# Patient Record
Sex: MALE | Race: WHITE | NOT HISPANIC OR LATINO | Employment: OTHER | URBAN - NONMETROPOLITAN AREA
[De-identification: names, ages, dates, MRNs, and addresses within clinical notes are randomized per-mention and may not be internally consistent; named-entity substitution may affect disease eponyms.]

---

## 2017-07-26 ENCOUNTER — OFFICE VISIT - GICH (OUTPATIENT)
Dept: PEDIATRICS | Facility: OTHER | Age: 72
End: 2017-07-26

## 2017-07-26 ENCOUNTER — HISTORY (OUTPATIENT)
Dept: PEDIATRICS | Facility: OTHER | Age: 72
End: 2017-07-26

## 2017-07-26 DIAGNOSIS — E78.5 HYPERLIPIDEMIA: ICD-10-CM

## 2017-07-26 DIAGNOSIS — Z95.9 PRESENCE OF CARDIAC AND VASCULAR IMPLANT AND GRAFT: ICD-10-CM

## 2017-07-26 DIAGNOSIS — I25.10 ATHEROSCLEROTIC HEART DISEASE OF NATIVE CORONARY ARTERY WITHOUT ANGINA PECTORIS: ICD-10-CM

## 2017-07-26 DIAGNOSIS — I10 ESSENTIAL (PRIMARY) HYPERTENSION: ICD-10-CM

## 2017-07-26 DIAGNOSIS — R09.89 OTHER SPECIFIED SYMPTOMS AND SIGNS INVOLVING THE CIRCULATORY AND RESPIRATORY SYSTEMS: ICD-10-CM

## 2017-07-26 DIAGNOSIS — J30.9 ALLERGIC RHINITIS: ICD-10-CM

## 2017-07-26 DIAGNOSIS — R31.1 BENIGN ESSENTIAL MICROSCOPIC HEMATURIA: ICD-10-CM

## 2017-07-26 DIAGNOSIS — E03.9 HYPOTHYROIDISM: ICD-10-CM

## 2017-07-26 DIAGNOSIS — Z11.59 ENCOUNTER FOR SCREENING FOR OTHER VIRAL DISEASES (CODE): ICD-10-CM

## 2017-07-26 LAB
ANION GAP - HISTORICAL: 12 (ref 5–18)
BUN SERPL-MCNC: 20 MG/DL (ref 7–25)
BUN/CREAT RATIO - HISTORICAL: 15
CALCIUM SERPL-MCNC: 10 MG/DL (ref 8.6–10.3)
CHLORIDE SERPLBLD-SCNC: 104 MMOL/L (ref 98–107)
CHOL/HDL RATIO - HISTORICAL: 2.79
CHOLESTEROL TOTAL: 145 MG/DL
CO2 SERPL-SCNC: 26 MMOL/L (ref 21–31)
CREAT SERPL-MCNC: 1.31 MG/DL (ref 0.7–1.3)
GFR IF NOT AFRICAN AMERICAN - HISTORICAL: 54 ML/MIN/1.73M2
GLUCOSE SERPL-MCNC: 104 MG/DL (ref 70–105)
HDLC SERPL-MCNC: 52 MG/DL (ref 23–92)
HEPATITIS C ANTIBODY CATEGORY - HISTORICAL: NORMAL
LDLC SERPL CALC-MCNC: 67 MG/DL
NON-HDL CHOLESTEROL - HISTORICAL: 93 MG/DL
PATIENT STATUS - HISTORICAL: NORMAL
POTASSIUM SERPL-SCNC: 4.3 MMOL/L (ref 3.5–5.1)
SODIUM SERPL-SCNC: 142 MMOL/L (ref 133–143)
TRIGL SERPL-MCNC: 132 MG/DL
TSH - HISTORICAL: 0.33 UIU/ML (ref 0.34–5.6)

## 2017-08-01 ENCOUNTER — HOSPITAL ENCOUNTER (OUTPATIENT)
Dept: RESPIRATORY THERAPY | Facility: OTHER | Age: 72
End: 2017-08-01
Attending: INTERNAL MEDICINE

## 2017-08-01 DIAGNOSIS — I25.10 ATHEROSCLEROTIC HEART DISEASE OF NATIVE CORONARY ARTERY WITHOUT ANGINA PECTORIS: ICD-10-CM

## 2017-08-01 DIAGNOSIS — R09.89 OTHER SPECIFIED SYMPTOMS AND SIGNS INVOLVING THE CIRCULATORY AND RESPIRATORY SYSTEMS: ICD-10-CM

## 2017-08-01 DIAGNOSIS — Z95.9 PRESENCE OF CARDIAC AND VASCULAR IMPLANT AND GRAFT: ICD-10-CM

## 2017-10-03 ENCOUNTER — AMBULATORY - GICH (OUTPATIENT)
Dept: FAMILY MEDICINE | Facility: OTHER | Age: 72
End: 2017-10-03

## 2017-10-03 DIAGNOSIS — Z23 ENCOUNTER FOR IMMUNIZATION: ICD-10-CM

## 2017-12-27 NOTE — PROGRESS NOTES
Patient Information     Patient Name MRN Sex Sandro Howe [id number] Male 1945      Progress Notes by Guero Turner MD at 2017  8:30 AM     Author:  Guero Turner MD Service:  (none) Author Type:  Physician     Filed:  2017 11:15 AM Encounter Date:  2017 Status:  Signed     :  Guero Turner MD (Physician)            Subjective  Sandro Yoon is a 72 y.o. male who presents for med education management, multiple concerns. His home blood pressure cuff has been detecting an irregular pulse. He feels a slight weakness during these episodes. No feeling of palpitations. On one occasion his meter detected a pulse of 41. No known history of atrial fibrillation. He does have a history of atherosclerotic cardiovascular disease status post angioplasty with stent and continues on aspirin and Lipitor. He's wondering if he could possibly stop his Lipitor because his lipid panels have been so low. He has a history of hypothyroidism which has been stable many years on levothyroxine 100  g daily. History of hypertension which has been stable with metoprolol and ramipril. History of erectile dysfunction which improves with rare and intermittent use of sildenafil. No chest pain or dyspnea on exertion. No orthopnea. No lower extremity edema. No rash. No diarrhea or constipation. No change in urinary habits.    Problem List/PMH: reviewed in EMR, and made relevant updates today.  Medications: reviewed in EMR, and made relevant updates today.  Allergies: reviewed in EMR, and made relevant updates today.    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      Never smoked. No coffee.  One soda daily.  Rare alcohol.      Likes to cross  "country ski and bike. .      I reviewed social history and made relevant updates today.    Family Hx:   Family History       Problem   Relation Age of Onset     Heart Disease  Mother      CAD       Heart Disease  Father      CAD       Cancer-prostate  Father 80     Heart Disease  Brother      3 of 4 have CAD       Cancer-prostate  Brother 40     Other  Brother      [facility] [place]       Cancer-prostate  Brother 65     Other  Brother      no heart disease       Other  Brother      [facility] [place] age 40s       Cancer-colon  No Family History      Anesthesia Problem  No Family History      Blood Disease  No Family History      no bleeding or clotting         Objective  Vitals: reviewed in EMR.  /82  Pulse 76  Ht 1.715 m (5' 7.5\")  Wt 72.1 kg (159 lb)  BMI 24.54 kg/m2    Gen: Pleasant male, NAD.  HEENT: MMM, no OP erythema.   Neck: Supple, no JVD, no bruits. No thyromegaly  CV: RRR no m/r/g.   Pulm: CTAB no w/r/r  GI: Soft, NT, ND. No HSM. No masses. Bowel sounds present.  Neuro: Grossly intact  Msk: No lower extremity edema.  Skin: No concerning lesions.  Psychiatric: Normal affect and insight. Does not appear anxious or depressed.    Diabetes Labs  Lab Results      Component  Value Date/Time    CHOL 145 07/26/2017 09:37 AM    HDL 52 07/26/2017 09:37 AM    LDLCHOL 67 07/26/2017 09:37 AM    TRIGLYCERIDE 132 07/26/2017 09:37 AM    CREATININE 1.31 (H) 07/26/2017 09:37 AM     PSA TOTAL (SCREEN) (ng/mL)    Date Value   10/18/2016 1.585         Assessment    ICD-10-CM    1. Pulse irregularity R09.89 ZIO PATCH-GICH      EKG 12 LEAD UNIT PERFORMED      NH ELECTROCARDIOGRAM TRACING   2. Hyperlipidemia, unspecified hyperlipidemia type E78.5 atorvastatin (LIPITOR) 10 mg tablet      LIPID PANEL      LIPID PANEL   3. Hypothyroidism, unspecified type E03.9 levothyroxine (SYNTHROID) 100 mcg tablet      TSH      TSH   4. Hypertension I10 metoprolol succinate (TOPROL XL) 50 mg sustained-release tablet      ramipril " (ALTACE) 2.5 mg capsule      BASIC METABOLIC PANEL      BASIC METABOLIC PANEL   5. S/P angioplasty with stent Z95.9 ZIO PATCH-GICH   6. ASCVD (arteriosclerotic cardiovascular disease) I25.10 ZIO PATCH-GICH   7. Benign microscopic hematuria R31.1     s/p workup with Mcginnis, negative.     8. Need for hepatitis C screening test Z11.59 ANTI HCV      ANTI HCV   9. Allergic rhinitis, unspecified allergic rhinitis trigger, unspecified rhinitis seasonality J30.9 fluticasone (50 mcg per actuation) nasal solution (FLONASE)       Initially when I listen to his pulse was irregular sounding like a pattern of bigeminy however by the time EKG was obtained it was sinus rhythm and subsequent auscultation revealed a regular rhythm. We discussed options and decided to obtain zio patch to rule out paroxysmal SVT, paroxysmal atrial fibrillation or other radio tachyarrhythmias. Fortunately he doesn't really feel ill during these episodes so it's unlikely to be ventricular tachycardia. No symptoms consistent with active angina.    Plan   -- Expected clinical course discussed   -- Medications and their side effects discussed   -- labs today   -- immunizations are up to date   -- medications reviewed and refilled today   -- zio patch   -- follow-up based on results   -- else follow-up for med management every 6-12 months    Signed, Guero Turner MD  Internal Medicine & Pediatrics

## 2017-12-30 NOTE — NURSING NOTE
Patient Information     Patient Name MRN Sandro Cardenas [id number] Male 1945      Nursing Note by Leesa Squires at 2017  8:30 AM     Author:  Leesa Squires Service:  (none) Author Type:  (none)     Filed:  2017  8:46 AM Encounter Date:  2017 Status:  Signed     :  Leesa Squires            Patient presents to clinic for medication management today.  Leesa Squires LPN ....................  2017   8:29 AM

## 2018-01-18 ENCOUNTER — OFFICE VISIT - GICH (OUTPATIENT)
Dept: PEDIATRICS | Facility: OTHER | Age: 73
End: 2018-01-18

## 2018-01-18 ENCOUNTER — HISTORY (OUTPATIENT)
Dept: PEDIATRICS | Facility: OTHER | Age: 73
End: 2018-01-18

## 2018-01-18 DIAGNOSIS — K40.90 UNILATERAL INGUINAL HERNIA WITHOUT OBSTRUCTION OR GANGRENE: ICD-10-CM

## 2018-01-25 VITALS
DIASTOLIC BLOOD PRESSURE: 82 MMHG | SYSTOLIC BLOOD PRESSURE: 120 MMHG | WEIGHT: 159 LBS | HEART RATE: 76 BPM | HEIGHT: 68 IN | BODY MASS INDEX: 24.1 KG/M2

## 2018-01-29 ENCOUNTER — DOCUMENTATION ONLY (OUTPATIENT)
Dept: FAMILY MEDICINE | Facility: OTHER | Age: 73
End: 2018-01-29

## 2018-01-29 PROBLEM — J30.9 ALLERGIC RHINITIS: Status: ACTIVE | Noted: 2017-07-26

## 2018-01-29 PROBLEM — K57.30 DIVERTICULAR DISEASE OF COLON: Status: ACTIVE | Noted: 2018-01-29

## 2018-01-29 PROBLEM — I25.10 CORONARY ATHEROSCLEROSIS: Status: ACTIVE | Noted: 2018-01-29

## 2018-01-29 PROBLEM — E03.9 HYPOTHYROIDISM: Status: ACTIVE | Noted: 2018-01-29

## 2018-01-29 PROBLEM — R31.1 BENIGN MICROSCOPIC HEMATURIA: Status: ACTIVE | Noted: 2017-07-26

## 2018-01-29 PROBLEM — I25.10 ASCVD (ARTERIOSCLEROTIC CARDIOVASCULAR DISEASE): Status: ACTIVE | Noted: 2017-07-26

## 2018-01-29 PROBLEM — E78.5 HYPERLIPIDEMIA: Status: ACTIVE | Noted: 2018-01-29

## 2018-01-29 RX ORDER — FLUTICASONE PROPIONATE 50 MCG
2 SPRAY, SUSPENSION (ML) NASAL DAILY
COMMUNITY
Start: 2017-07-26 | End: 2019-05-07

## 2018-01-29 RX ORDER — TRIAMCINOLONE ACETONIDE 1 MG/G
1 OINTMENT TOPICAL 3 TIMES DAILY
COMMUNITY
Start: 2012-08-23 | End: 2019-09-10

## 2018-01-29 RX ORDER — ASPIRIN 81 MG/1
81 TABLET, CHEWABLE ORAL
COMMUNITY
Start: 2015-11-12 | End: 2019-09-10

## 2018-01-29 RX ORDER — METOPROLOL SUCCINATE 50 MG/1
50 TABLET, EXTENDED RELEASE ORAL DAILY
COMMUNITY
Start: 2017-07-26 | End: 2018-08-23

## 2018-01-29 RX ORDER — SILDENAFIL CITRATE 20 MG/1
20-40 TABLET ORAL PRN
COMMUNITY
Start: 2016-10-11 | End: 2018-08-21

## 2018-01-29 RX ORDER — NAPROXEN SODIUM 220 MG
220 TABLET ORAL PRN
COMMUNITY
End: 2018-08-21

## 2018-01-29 RX ORDER — LEVOTHYROXINE SODIUM 100 UG/1
100 TABLET ORAL
COMMUNITY
Start: 2017-07-26 | End: 2018-08-23

## 2018-01-29 RX ORDER — ACETAMINOPHEN 500 MG
500 TABLET ORAL EVERY 6 HOURS PRN
COMMUNITY
Start: 2014-12-10 | End: 2019-05-30

## 2018-01-29 RX ORDER — ATORVASTATIN CALCIUM 10 MG/1
5 TABLET, FILM COATED ORAL AT BEDTIME
COMMUNITY
Start: 2017-07-26 | End: 2018-08-21

## 2018-01-29 RX ORDER — RAMIPRIL 2.5 MG/1
2.5 CAPSULE ORAL DAILY
COMMUNITY
Start: 2017-07-26 | End: 2018-09-17

## 2018-01-29 RX ORDER — FLUOROURACIL 50 MG/G
1 CREAM TOPICAL AT BEDTIME
COMMUNITY
Start: 2016-07-07 | End: 2019-09-10

## 2018-01-30 ENCOUNTER — HISTORY (OUTPATIENT)
Dept: SURGERY | Facility: OTHER | Age: 73
End: 2018-01-30

## 2018-01-30 ENCOUNTER — OFFICE VISIT - GICH (OUTPATIENT)
Dept: SURGERY | Facility: OTHER | Age: 73
End: 2018-01-30

## 2018-01-30 DIAGNOSIS — K40.90 UNILATERAL INGUINAL HERNIA WITHOUT OBSTRUCTION OR GANGRENE: ICD-10-CM

## 2018-02-09 VITALS
WEIGHT: 159.5 LBS | HEART RATE: 72 BPM | SYSTOLIC BLOOD PRESSURE: 120 MMHG | BODY MASS INDEX: 24.61 KG/M2 | DIASTOLIC BLOOD PRESSURE: 70 MMHG

## 2018-02-09 VITALS
HEIGHT: 68 IN | SYSTOLIC BLOOD PRESSURE: 118 MMHG | HEART RATE: 72 BPM | BODY MASS INDEX: 24.02 KG/M2 | WEIGHT: 158.5 LBS | DIASTOLIC BLOOD PRESSURE: 68 MMHG

## 2018-02-13 ENCOUNTER — OFFICE VISIT (OUTPATIENT)
Dept: PEDIATRICS | Facility: OTHER | Age: 73
End: 2018-02-13
Attending: INTERNAL MEDICINE
Payer: COMMERCIAL

## 2018-02-13 VITALS
SYSTOLIC BLOOD PRESSURE: 116 MMHG | HEART RATE: 76 BPM | HEIGHT: 68 IN | TEMPERATURE: 96.9 F | WEIGHT: 159 LBS | DIASTOLIC BLOOD PRESSURE: 72 MMHG | BODY MASS INDEX: 24.1 KG/M2

## 2018-02-13 DIAGNOSIS — L30.9 DERMATITIS: Primary | ICD-10-CM

## 2018-02-13 PROCEDURE — G0463 HOSPITAL OUTPT CLINIC VISIT: HCPCS

## 2018-02-13 PROCEDURE — 99213 OFFICE O/P EST LOW 20 MIN: CPT | Performed by: INTERNAL MEDICINE

## 2018-02-13 RX ORDER — PREDNISONE 20 MG/1
20 TABLET ORAL DAILY
Qty: 3 TABLET | Refills: 0 | Status: SHIPPED | OUTPATIENT
Start: 2018-02-13 | End: 2018-02-16

## 2018-02-13 ASSESSMENT — PAIN SCALES - GENERAL: PAINLEVEL: NO PAIN (0)

## 2018-02-13 NOTE — MR AVS SNAPSHOT
"              After Visit Summary   2/13/2018    Sandro Yoon    MRN: [id number]           Patient Information     Date Of Birth          1945        Visit Information        Provider Department      2/13/2018 10:00 AM Guero Turner MD [facility]        Today's Diagnoses     Dermatitis    -  1      Care Instructions     -- Prednisone x 3 days   -- Topical steroid for particularly itchy spot   -- Liberally use a fragrance free moisturizer all over   -- Avoid the pool briefly      Contact Dermatitis  Contact dermatitis is a skin rash caused by something that touches the skin and makes it irritated and inflamed. Your skin may be red, swollen, dry, and may be cracked. Blisters may form and ooze. The rash will itch.  Contact dermatitis can form on the face and neck, backs of hands, forearms, genitals, and lower legs.  People can get contact dermatitis from lots of sources. These include:    Plants such as poison ivy, oak, or sumac    Chemicals in hair dyes and rinses, soaps, solvents, waxes, fingernail polish, and deodorants     Jewelry or watchbands made of nickel  Contact dermatitis is not passed from person to person.  Talk with your healthcare provider about what may have caused the rash. A type of allergy testing called \"patch testing\" may be used to discover what you are allergic to. You will need to avoid the source of your rash in the future to prevent it from coming back.  Treatment is done to relieve itching and prevent the rash from coming back. The rash should go away in a few days to a few weeks.  Home care  Your healthcare provider may prescribe medicine to relieve swelling and itching. Follow all instructions when using these medicines.  General care:    Avoid anything that heats up your skin, such as hot showers or baths, or direct sunlight. This can make itching worse.    Apply cold compresses to soothe your sores to help relieve your symptoms. Do this for 30 " minutes 3 to 4 times a day. You can make a cold compress by soaking a cloth in cold water. Squeeze out excess water. You can add colloidal oatmeal to the water to help reduce itching. For severe itching in a small area, apply an ice pack wrapped in a thin towel. Do this for 20 minutes 3 to 4 times a day.    You can also try wet dressings. One way to do this is to wear a wet piece of clothing under a dry one. Wear a damp shirt under a dry shirt if your upper body is affected. This can relieve itching and prevent you from scratching the affected area.    You can also help relieve large areas of itching by taking a lukewarm bath with colloidal oatmeal added to the water.    Use hydrocortisone cream for redness and irritation, unless another medicine was prescribed. You can also use benzocaine anesthetic cream or spray. Calamine lotion can also relieve mild symptoms.    Use oral diphenhydramine to help reduce itching. You can buy this antihistamine at drug and grocery stores. It can make you sleepy, so use lower doses during the daytime. Or you can use loratadine. This is an antihistamine that will not make you sleepy. Do not use diphenhydramine if you have glaucoma or have trouble urinating due to an enlarged prostate.    If a plant causes your rash, make sure to wash your skin and the clothes you were wearing when you came into contact with the plant. This is to wash away the plant oils that gave you the rash and prevent more or worse symptoms.    Stay away from the substance or object that causes your symptoms. If you can t avoid it, wear gloves or some other type of protection.  Follow-up care  Follow up with your healthcare provider, or as advised.  When to seek medical advice  Call your healthcare provider right away if any of these occur:    Spreading of the rash to other parts of your body    Severe swelling of your face, eyelids, mouth, throat or tongue    Trouble urinating due to swelling in the genital  "area    Fever of 100.4 F (38 C) or higher    Redness or swelling that gets worse    Pain that gets worse    Foul-smelling fluid leaking from the skin    Yellow-brown crusts on the open blisters  Date Last Reviewed: 2016-2017 [employer]. [address] [place] [address]. All rights reserved. This information is not intended as a substitute for professional medical care. Always follow your healthcare professional's instructions.                Follow-ups after your visit        Who to contact     If you have questions or need follow up information about today's clinic visit or your schedule please contact [facility] directly at [phone].  Normal or non-critical lab and imaging results will be communicated to you by Preedohart, letter or phone within 4 business days after the clinic has received the results. If you do not hear from us within 7 days, please contact the clinic through Preedohart or phone. If you have a critical or abnormal lab result, we will notify you by phone as soon as possible.  Submit refill requests through Nveloped or call your pharmacy and they will forward the refill request to us. Please allow 3 business days for your refill to be completed.          Additional Information About Your Visit        PreedoharEyeVerify Information     Nveloped lets you send messages to your doctor, view your test results, renew your prescriptions, schedule appointments and more. To sign up, go to [email or web address] . Click on \"Log in\" on the left side of the screen, which will take you to the Welcome page. Then click on \"Sign up Now\" on the right side of the page.     You will be asked to enter the access code listed below, as well as some personal information. Please follow the directions to create your username and password.     Your access code is: [email or web address]  Expires: 2018 10:31 AM     Your access code will  in 90 days. If you need help or a new " "code, please call your [facility] clinic or [phone].        Care EveryWhere ID     This is your Care EveryWhere ID. This could be used by other organizations to access your [facility] medical records  [id number]        Your Vitals Were     Pulse Temperature Height BMI (Body Mass Index)          76 96.9  F (36.1  C) (Tympanic) 5' 7.5\" (1.715 m) 24.54 kg/m2         Blood Pressure from Last 3 Encounters:   02/13/18 116/72   01/30/18 120/70   01/18/18 118/68    Weight from Last 3 Encounters:   02/13/18 159 lb (72.1 kg)   01/30/18 159 lb 8 oz (72.3 kg)   01/18/18 158 lb 8 oz (71.9 kg)              Today, you had the following     No orders found for display         Today's Medication Changes          These changes are accurate as of 2/13/18 10:31 AM.  If you have any questions, ask your nurse or doctor.               Start taking these medicines.        Dose/Directions    predniSONE 20 MG tablet   Commonly known as:  DELTASONE   Used for:  Dermatitis   Started by:  Guero Turner MD        Dose:  20 mg   Take 1 tablet (20 mg) by mouth daily for 3 days   Quantity:  3 tablet   Refills:  0            Where to get your medicines      These medications were sent to [facility] [place] [place] [facility] [address]  [address] [address]     Phone:  [phone]     predniSONE 20 MG tablet                Primary Care Provider Office Phone # Fax #    Guero Turner [phone]       [address] [facility] [address]        Equal Access to Services     [facility] AH: Hadii lisa Miller, wanada lunancy, qaybta jamesalnoble paris. So [facility] [phone].    ATENCIÓN: Si habla español, tiene a robertson disposición servicios gratuitos de asistencia lingüística. Gloria al [phone].    We comply with applicable federal civil rights laws and [place] laws. We do not discriminate on the basis of " race, color, national origin, age, disability, sex, sexual orientation, or gender identity.            Thank you!     Thank you for choosing [facility]\""  for your care. Our goal is always to provide you with excellent care. Hearing back from our patients is one way we can continue to improve our services. Please take a few minutes to complete the written survey that you may receive in the mail after your visit with us. Thank you!             Your Updated Medication List - Protect others around you: Learn how to safely use, store and throw away your medicines at www.disposemymeds.org.          This list is accurate as of 2/13/18 10:31 AM.  Always use your most recent med list.                   Brand Name Dispense Instructions for use Diagnosis    acetaminophen 500 MG tablet    TYLENOL     Take 500 mg by mouth every 6 hours as needed Take 1 tablet by mouth every 6 hours if needed. Max acetaminophen dose: 4000mg in 24 hrs        aspirin 81 MG chewable tablet      Take 81 mg by mouth daily with food Take 1 tablet by mouth once daily with a meal.        atorvastatin 10 MG tablet    LIPITOR     Take 5 mg by mouth At Bedtime Take 0.5 tablets by mouth at bedtime. 1/2 tab daily        fluorouracil 5 % cream    EFUDEX     Apply 1 Application topically At Bedtime APPLY ONCE DAILY AT BEDTIME FOR 4 WEEKS. WASH OFF IN THE MORNING        fluticasone 50 MCG/ACT spray    FLONASE     Spray 2 sprays in nostril daily Inhale 2 Sprays into both nostrils once daily.        levothyroxine 100 MCG tablet    SYNTHROID/LEVOTHROID     Take 100 mcg by mouth every morning (before breakfast) Take 1 tablet by mouth before breakfast.        metoprolol succinate 50 MG 24 hr tablet    TOPROL-XL     Take 50 mg by mouth daily Take 1 tablet by mouth once daily.        naproxen sodium 220 MG tablet    ANAPROX     Take 220 mg by mouth as needed Take 220 mg by mouth one time if needed.        predniSONE 20 MG tablet    DELTASONE    3  tablet    Take 1 tablet (20 mg) by mouth daily for 3 days    Dermatitis       ramipril 2.5 MG capsule    ALTACE     Take 2.5 mg by mouth daily Take 1 capsule by mouth once daily.        sildenafil 20 MG tablet    REVATIO     Take 20-40 mg by mouth as needed Take 1-2 tablets by mouth once daily if needed for Other (Specify) (30 min prior to sexual activity).        triamcinolone 0.1 % ointment    KENALOG     Apply 1 Application topically 3 times daily Apply  topically to affected area(s) 3 times daily.

## 2018-02-13 NOTE — PATIENT INSTRUCTIONS
Patient Information     Patient Name MRN Sex Sandro Howe [id number] Male 1945      Patient Instructions by Guero Turner MD at 2018 11:30 AM     Author:  Guero Turner MD Service:  (none) Author Type:  Physician     Filed:  2018 11:41 AM Encounter Date:  2018 Status:  Signed     :  Guero Turner MD (Physician)               Index Related topics   Groin Hernia   ________________________________________________________________________  KEY POINTS    When you have a hernia, part of your intestine (bowel) bulges through a weak area or gap in the muscles in the wall of your belly. A groin hernia is a hernia in the lower part of your belly, where your legs join the lower part of your body.    Surgery to repair the opening in the muscle wall is the main treatment for a groin hernia.    Ask your provider how to take care of yourself at home, if there are activities you should avoid, and when you can return to your normal activities.  ________________________________________________________________________  What is a groin hernia?  When you have a hernia, part of your intestine (bowel) bulges through a weak area or gap in the muscles in the wall of your belly.   A groin hernia is a hernia in the lower part of your belly, where your legs join the lower part of your body. Another name for groin hernia is inguinal hernia because the bowel bulges into the inguinal canal, a space between layers of muscle in your groin.  What is the cause?  A hernia may be caused by anything that causes the intestines to push against a weak area in your belly muscles. Some people are born with a weakness in these muscles. A groin hernia can happen to anyone, but it is much more common in men than women. It can happen when you:    Lift heavy objects    Cough or sneeze a lot    Push too hard when you have a bowel movement    Are overweight    Are pregnant  Men with an enlarged prostate can sometimes  get a hernia from pushing too hard to urinate. It may also happen after you have had surgery on your lower belly.  What are the symptoms?  Symptoms may include:    Pain or discomfort in the lower belly or groin, especially with physical activity    A lump in the groin that appears when you lift something or exert yourself    A lump in the groin that you can push back in    A burning, aching, or heavy feeling in your lower belly    A swollen or enlarged scrotum in men or boys  If you have a lump in the groin that cannot be pushed back, it can mean that part of your intestines is trapped in the gap between the muscles and you need to get medical care right away. A groin hernia can become a serious problem if your intestines get trapped. Then blood cannot get to that part of your intestines and part of the intestines may die. This can make you very sick. If this happens, you need surgery right away.  How is it diagnosed?  Your healthcare provider will ask about your symptoms and medical history and examine you. Tests may include:    An ultrasound, which uses sound waves to show pictures of the organs inside the lower belly    CT scan, which uses X-rays and a computer to show detailed pictures of the organs inside the belly    MRI, which uses a strong magnetic field and radio waves to show detailed pictures of the organs inside the belly  How is it treated?  Surgery to repair the opening in the muscle wall is the main treatment for a groin hernia. Your healthcare provider will close the weak spot. Your provider may sew a piece of mesh over the weak spot and under the skin to make the area stronger.  The hernia will not get better on its own without treatment, but it also may not get worse for months or even years. If your hernia is not causing problems, you may choose not to have surgery. You may need to use a groin support. Ask your healthcare provider what is recommended for you.  How can I take care of  myself?    Make sure that you know symptoms to watch for that might mean the hernia has trapped your intestines. This is a medical emergency and you would need surgery right away.    Follow safe practices when you move heavy things. Learn how to lift and move heavy items safely. Remember to use your legs. Bend at your knees, not at your waist. Ask your healthcare provider how many pounds you can safely lift.    Ask your provider if you need to wear a groin support.    Try to keep a healthy weight. If you are overweight, lose weight.    Avoid constipation by eating foods that are high in fiber, using stool softeners, or drinking a natural stimulant beverage, like prune juice. Drink plenty of water. Use laxatives or enemas only if recommended by your provider.    If you smoke, try to quit. Smoking may cause coughing, which puts extra pressure on the belly and groin muscles. It s also important to stop smoking if you need surgery. Stopping 6 weeks or more before surgery can lower your chances of complications from surgery, such as pneumonia. Talk to your healthcare provider about ways to quit smoking.    Take medicine as needed to reduce sneezing and coughing from allergies.  Follow the full course of treatment prescribed by your healthcare provider. Ask your provider:    How and when you will get your test results    How long it will take to recover    If there are activities you should avoid and when you can return to your normal activities    How to take care of yourself at home    What symptoms or problems you should watch for and what to do if you have them  Make sure you know when you should come back for a checkup. Keep all appointments for provider visits or tests.  Developed by [employer].  Adult Advisor 2017.2 published by [employer].  Last modified: 2016-12-08  Last reviewed: 2016-12-06  This content is reviewed periodically and is subject to change as new health information becomes available. The  information is intended to inform and educate and is not a replacement for medical evaluation, advice, diagnosis or treatment by a healthcare professional.  References   Adult Advisor 2017.2 Index    Copyright   2017 [employer], a division of [employer] All rights reserved.

## 2018-02-13 NOTE — PATIENT INSTRUCTIONS
" -- Prednisone x 3 days   -- Topical steroid for particularly itchy spot   -- Liberally use a fragrance free moisturizer all over   -- Avoid the pool briefly      Contact Dermatitis  Contact dermatitis is a skin rash caused by something that touches the skin and makes it irritated and inflamed. Your skin may be red, swollen, dry, and may be cracked. Blisters may form and ooze. The rash will itch.  Contact dermatitis can form on the face and neck, backs of hands, forearms, genitals, and lower legs.  People can get contact dermatitis from lots of sources. These include:    Plants such as poison ivy, oak, or sumac    Chemicals in hair dyes and rinses, soaps, solvents, waxes, fingernail polish, and deodorants     Jewelry or watchbands made of nickel  Contact dermatitis is not passed from person to person.  Talk with your healthcare provider about what may have caused the rash. A type of allergy testing called \"patch testing\" may be used to discover what you are allergic to. You will need to avoid the source of your rash in the future to prevent it from coming back.  Treatment is done to relieve itching and prevent the rash from coming back. The rash should go away in a few days to a few weeks.  Home care  Your healthcare provider may prescribe medicine to relieve swelling and itching. Follow all instructions when using these medicines.  General care:    Avoid anything that heats up your skin, such as hot showers or baths, or direct sunlight. This can make itching worse.    Apply cold compresses to soothe your sores to help relieve your symptoms. Do this for 30 minutes 3 to 4 times a day. You can make a cold compress by soaking a cloth in cold water. Squeeze out excess water. You can add colloidal oatmeal to the water to help reduce itching. For severe itching in a small area, apply an ice pack wrapped in a thin towel. Do this for 20 minutes 3 to 4 times a day.    You can also try wet dressings. One way to do this is to " wear a wet piece of clothing under a dry one. Wear a damp shirt under a dry shirt if your upper body is affected. This can relieve itching and prevent you from scratching the affected area.    You can also help relieve large areas of itching by taking a lukewarm bath with colloidal oatmeal added to the water.    Use hydrocortisone cream for redness and irritation, unless another medicine was prescribed. You can also use benzocaine anesthetic cream or spray. Calamine lotion can also relieve mild symptoms.    Use oral diphenhydramine to help reduce itching. You can buy this antihistamine at drug and grocery stores. It can make you sleepy, so use lower doses during the daytime. Or you can use loratadine. This is an antihistamine that will not make you sleepy. Do not use diphenhydramine if you have glaucoma or have trouble urinating due to an enlarged prostate.    If a plant causes your rash, make sure to wash your skin and the clothes you were wearing when you came into contact with the plant. This is to wash away the plant oils that gave you the rash and prevent more or worse symptoms.    Stay away from the substance or object that causes your symptoms. If you can t avoid it, wear gloves or some other type of protection.  Follow-up care  Follow up with your healthcare provider, or as advised.  When to seek medical advice  Call your healthcare provider right away if any of these occur:    Spreading of the rash to other parts of your body    Severe swelling of your face, eyelids, mouth, throat or tongue    Trouble urinating due to swelling in the genital area    Fever of 100.4 F (38 C) or higher    Redness or swelling that gets worse    Pain that gets worse    Foul-smelling fluid leaking from the skin    Yellow-brown crusts on the open blisters  Date Last Reviewed: 9/1/2016 2000-2017 [employer]. [address] [place] [address], [place] [address]. All rights reserved. This information is not intended as a  substitute for professional medical care. Always follow your healthcare professional's instructions.

## 2018-02-13 NOTE — PROGRESS NOTES
Patient Information     Patient Name MRN Sex Sandro Howe [id number] Male 1945      Progress Notes by Guero Turner MD at 2018 11:30 AM     Author:  Guero Turner MD Service:  (none) Author Type:  Physician     Filed:  2018  1:42 PM Encounter Date:  2018 Status:  Signed     :  Guero Turner MD (Physician)            Subjective  Sandro Yoon is a 72 y.o. male who presents for possible hernia. Happened a couple weeks ago. He just told his wife about yesterday who demanded he be seen. It's in the right inguinal area. Seems to bulge more with sneezing or coughing. It feels like a pressure sensation, no pain. No change in bowel or urinary habits.    Problem List/PMH: reviewed in EMR, and made relevant updates today.  Medications: reviewed in EMR, and made relevant updates today.  Allergies: reviewed in EMR, and made relevant updates today.    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      Never smoked. No coffee.  One soda daily.  Rare alcohol.      Likes to cross country ski and bike. .      I reviewed social history and made relevant updates today.    Family Hx:   Family History       Problem   Relation Age of Onset     Heart Disease  Mother      CAD       Heart Disease  Father      CAD       Cancer-prostate  Father 80     Heart Disease  Brother      3 of 4 have CAD       Cancer-prostate  Brother 40     Other  Brother      CABG [facility] [place] young age       Cancer-prostate  Brother 65     Other  Brother      no heart disease       Other  Brother      CABG [facility] [place] young age 40s       Cancer-colon  No Family History      Anesthesia Problem  No Family History      Blood Disease  No Family History      no bleeding or  "clotting         Objective  Vitals: reviewed in EMR.  /68 (Cuff Site: Right Arm, Position: Sitting, Cuff Size: Adult Large)  Pulse 72  Ht 1.715 m (5' 7.5\")  Wt 71.9 kg (158 lb 8 oz)  BMI 24.46 kg/m2    Gen: Pleasant male, NAD.  HEENT: MMM  Neck: Supple  Pulm: Breathing easily  Neuro: Grossly intact  Skin: No concerning lesions.  Psychiatric: Normal affect and insight. Does not appear anxious or depressed.  : Palpable inguinal hernia is present in the right inguinal area which is easily reducible and nontender      Assessment    ICD-10-CM    1. Right inguinal hernia K40.90 AMB CONSULT TO GENERAL SURGEON       Plan   -- Expected clinical course discussed   -- Medications and their side effects discussed  Patient Instructions      Index Related topics   Groin Hernia   ________________________________________________________________________  KEY POINTS    When you have a hernia, part of your intestine (bowel) bulges through a weak area or gap in the muscles in the wall of your belly. A groin hernia is a hernia in the lower part of your belly, where your legs join the lower part of your body.    Surgery to repair the opening in the muscle wall is the main treatment for a groin hernia.    Ask your provider how to take care of yourself at home, if there are activities you should avoid, and when you can return to your normal activities.  ________________________________________________________________________  What is a groin hernia?  When you have a hernia, part of your intestine (bowel) bulges through a weak area or gap in the muscles in the wall of your belly.   A groin hernia is a hernia in the lower part of your belly, where your legs join the lower part of your body. Another name for groin hernia is inguinal hernia because the bowel bulges into the inguinal canal, a space between layers of muscle in your groin.  What is the cause?  A hernia may be caused by anything that causes the intestines to push " against a weak area in your belly muscles. Some people are born with a weakness in these muscles. A groin hernia can happen to anyone, but it is much more common in men than women. It can happen when you:    Lift heavy objects    Cough or sneeze a lot    Push too hard when you have a bowel movement    Are overweight    Are pregnant  Men with an enlarged prostate can sometimes get a hernia from pushing too hard to urinate. It may also happen after you have had surgery on your lower belly.  What are the symptoms?  Symptoms may include:    Pain or discomfort in the lower belly or groin, especially with physical activity    A lump in the groin that appears when you lift something or exert yourself    A lump in the groin that you can push back in    A burning, aching, or heavy feeling in your lower belly    A swollen or enlarged scrotum in men or boys  If you have a lump in the groin that cannot be pushed back, it can mean that part of your intestines is trapped in the gap between the muscles and you need to get medical care right away. A groin hernia can become a serious problem if your intestines get trapped. Then blood cannot get to that part of your intestines and part of the intestines may die. This can make you very sick. If this happens, you need surgery right away.  How is it diagnosed?  Your healthcare provider will ask about your symptoms and medical history and examine you. Tests may include:    An ultrasound, which uses sound waves to show pictures of the organs inside the lower belly    CT scan, which uses X-rays and a computer to show detailed pictures of the organs inside the belly    MRI, which uses a strong magnetic field and radio waves to show detailed pictures of the organs inside the belly  How is it treated?  Surgery to repair the opening in the muscle wall is the main treatment for a groin hernia. Your healthcare provider will close the weak spot. Your provider may sew a piece of mesh over the weak  spot and under the skin to make the area stronger.  The hernia will not get better on its own without treatment, but it also may not get worse for months or even years. If your hernia is not causing problems, you may choose not to have surgery. You may need to use a groin support. Ask your healthcare provider what is recommended for you.  How can I take care of myself?    Make sure that you know symptoms to watch for that might mean the hernia has trapped your intestines. This is a medical emergency and you would need surgery right away.    Follow safe practices when you move heavy things. Learn how to lift and move heavy items safely. Remember to use your legs. Bend at your knees, not at your waist. Ask your healthcare provider how many pounds you can safely lift.    Ask your provider if you need to wear a groin support.    Try to keep a healthy weight. If you are overweight, lose weight.    Avoid constipation by eating foods that are high in fiber, using stool softeners, or drinking a natural stimulant beverage, like prune juice. Drink plenty of water. Use laxatives or enemas only if recommended by your provider.    If you smoke, try to quit. Smoking may cause coughing, which puts extra pressure on the belly and groin muscles. It s also important to stop smoking if you need surgery. Stopping 6 weeks or more before surgery can lower your chances of complications from surgery, such as pneumonia. Talk to your healthcare provider about ways to quit smoking.    Take medicine as needed to reduce sneezing and coughing from allergies.  Follow the full course of treatment prescribed by your healthcare provider. Ask your provider:    How and when you will get your test results    How long it will take to recover    If there are activities you should avoid and when you can return to your normal activities    How to take care of yourself at home    What symptoms or problems you should watch for and what to do if you have  them  Make sure you know when you should come back for a checkup. Keep all appointments for provider visits or tests.  Developed by [employer].  Adult Advisor 2017.2 published by [employer].  Last modified: 2016-12-08  Last reviewed: 2016-12-06  This content is reviewed periodically and is subject to change as new health information becomes available. The information is intended to inform and educate and is not a replacement for medical evaluation, advice, diagnosis or treatment by a healthcare professional.  References   Adult Advisor 2017.2 Index    Copyright   2017 [employer], a division of [employer] All rights reserved.           No Follow-up on file.    Signed, Guero Turner MD  Internal Medicine & Pediatrics

## 2018-02-13 NOTE — NURSING NOTE
Patient Information     Patient Name MRN Sex Sandro Howe [id number] Male 1945      Nursing Note by Estefani Gaitan at 2018  3:30 PM     Author:  Estefani Gaitan Service:  (none) Author Type:  (none)     Filed:  2018  3:35 PM Encounter Date:  2018 Status:  Signed     :  Estefani Gaitan            Patient is here today for a consult for a inguinal hernia.  Estefani Gaitan LPN.......................... 2018  3:32 PM

## 2018-02-13 NOTE — NURSING NOTE
Patient Information     Patient Name MRN Sex Sandro Howe [id number] Male 1945      Nursing Note by Leesa Squires at 2018 11:30 AM     Author:  Leesa Squires Service:  (none) Author Type:  (none)     Filed:  2018 11:37 AM Encounter Date:  2018 Status:  Signed     :  Leesa Squires            Patient presents to clinic for possible hiatal hernia on the right side that showed up after coughing hard.   Leesa Squires LPN ....................  2018   11:29 AM

## 2018-02-13 NOTE — NURSING NOTE
Patient presents to clinic for itchy rash on stomach that wraps around to his back.  RUDY Hills ....................  2/13/2018   10:00 AM

## 2018-02-13 NOTE — PROGRESS NOTES
Patient Information     Patient Name MRN Sex Sandro Howe [id number] Male 1945      Progress Notes by Aurora Verdin MD at 2018  3:30 PM     Author:  Aurora Verdin MD Service:  (none) Author Type:  Physician     Filed:  2018  2:19 PM Encounter Date:  2018 Status:  Signed     :  Aurora Verdin MD (Physician)            OFFICE CONSULTATION NOTE  Patient Name: Sandro Yoon  Address: [address] [place] [id number]  Age:72 y.o.  Sex: male     Primary Care Physician: Guero Turner MD    I was requested to see this patient in consultation by Guero Turner MD for evaluation of right inguinal bulge. A copy of this note will be sent to Guero Turner MD.    HPI:   The patient is 72 y.o. male with a bulge in the right groin. It has been present for about a month as far as he knows. It sometimes gets larger and then gets smaller again. Has some pressure with coughing or sneezing. No pain. Denies nausea, vomiting, constipation and diarrhea. No problems with urinating. No testicular pain or swelling. No previous hernia surgery. He and his wife are going to [place] in March.    CONSULTATION ASSESSMENT AND PLAN/RECOMMENDATIONS:   Right  inguinal hernia-I explained the pathophysiology of inguinal hernias to the patient. I discussed with the patient the risks, benefits and alternatives to repair of inguinal hernia with mesh. Specifically, we discussed the risks of infection, bleeding, injury to the testicle and testicular function, injury to abdominal organs, the possibility of hernia recurrence and the possibility of chronic pain. We also discussed the expected post operative limitations and recovery time. The patient expressed understanding. We discussed the signs and symptoms of incarceration. The patient declines surgical repair at this time. He will call with questions or concerns.    REVIEW OF SYSTEMS  GENERAL: No fevers or chills. Denies fatigue, recent weight  loss.  HEENT: No sinus drainage. No changes with vision or hearing. No difficulty swallowing.   LYMPHATICS:  No swollen nodes in axilla, neck or groin.  CARDIOVASCULAR: Denies chest pain, palpitations and dyspnea on exertion.  PULMONARY: No shortness of breath or cough. No increase in sputum production.  GI: Denies melena, bright red blood in stools. No hematemesis. No new constipation or diarrhea.  : No dysuria or hematuria.  SKIN: No recent rashes or ulcers.   HEMATOLOGY:  No history of easy bruising or bleeding.  ENDOCRINE:  No history of diabetes or thyroid problems.  NEUROLOGY:  No history of seizures or headaches. No motor or sensory changes.    PAST MEDICAL HISTORY  Past Medical History:     Diagnosis  Date     Amnesia, global, transient     hx of      Skin irritation     perianal area      Vertigo 2001    episode       PAST SURGICAL HISTORY  Past Surgical History:      Procedure  Laterality Date     ANGIOPLASTY AND STENTING  2005    90% LAD & 99% SECOND DIAGONAL       APPENDECTOMY       CATARACT REMOVAL  2007    left sided, cataract extraction and intraocular lens       CATARACT REMOVAL  2007    right sided, cataract  extraction and intraocular lens       COLONOSCOPY SCREENING  2009    Due 2019       CORONARY STENT PLACEMENT  2003    stenting, mid left anterior descending lesion       FLEXIBLE SIGMOIDOSCOPY  1998     LAP CHOLECYSTECTOMY  2007    with cholangiogram       OTHER      patient states sensitive to ANESTHESIA       TONSILLECTOMY       TRANSURETHRAL RESECTION OF BLADDER TUMOR  11/17/15    Dr. Ras LUNA        CURRENT MEDS  Current Outpatient Prescriptions on File Prior to Visit       Medication  Sig Dispense Refill     acetaminophen (TYLENOL EXTRA STRGTH) 500 mg tablet Take 1 tablet by mouth every 6 hours if needed. Max acetaminophen dose: 4000mg in 24 hrs.  0     aspirin chewable 81 mg chewable tablet Take 1 tablet by mouth once daily with a meal. 1 tablet 0     atorvastatin (LIPITOR) 10 mg  tablet Take 0.5 tablets by mouth at bedtime. 1/2 tab daily 45 tablet 4     fluorouracil 5% topical (EFUDEX) 5 % cream APPLY ONCE DAILY AT BEDTIME FOR 4 WEEKS. WASH OFF IN THE MORNING  3     fluticasone (50 mcg per actuation) nasal solution (FLONASE) Inhale 2 Sprays into both nostrils once daily. 3 Bottle 4     levothyroxine (SYNTHROID) 100 mcg tablet Take 1 tablet by mouth before breakfast. 90 tablet 4     metoprolol succinate (TOPROL XL) 50 mg sustained-release tablet Take 1 tablet by mouth once daily. 90 tablet 4     naproxen (ALEVE) 220 mg tablet Take 220 mg by mouth one time if needed.       ramipril (ALTACE) 2.5 mg capsule Take 1 capsule by mouth once daily. 90 capsule 4     sildenafil (REVATIO) 20 mg tablet Take 1-2 tablets by mouth once daily if needed for Other (Specify) (30 min prior to sexual activity). 20 tablet 11     triamcinolone (ARISTOCORT) 0.1 % ointment Apply  topically to affected area(s) 3 times daily. 1 Tube 0     No current facility-administered medications on file prior to visit.      ALLERGIES/SENSITIVITIES  Allergies     Allergen  Reactions     Hydrocodone-Acetaminophen Nausea And Vomiting     Simvastatin Rash     Thimerosal Edema     Diatrizoate Meglumine (Iv Contrast Dye) Rash     FAMILY HISTORY  Family History       Problem   Relation Age of Onset     Heart Disease  Mother      CAD       Heart Disease  Father      CAD       Cancer-prostate  Father 80     Heart Disease  Brother      3 of 4 have CAD       Cancer-prostate  Brother 40     Other  Brother      CABG [place] young age       Cancer-prostate  Brother 65     Other  Brother      no heart disease       Other  Brother      CABG [facility] young age 40s       Cancer-colon  No Family History      Anesthesia Problem  No Family History      Blood Disease  No Family History      no bleeding or clotting        SOCIAL HISTORY  Social History     Social History        Marital status:       Spouse name: N/A     Number of children:  N/A     Years of  education:  N/A     Occupational History        pharmacist       retired      Social History Main Topics         Smoking status:   Never Smoker     Smokeless tobacco:   Never Used     Alcohol use   Yes       Comment: occasionally      Drug use:   No     Sexual activity:   Not on file     Other Topics  Concern     Not on file      Social History Narrative     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      Never smoked. No coffee.  One soda daily.  Rare alcohol.      Likes to cross country ski and bike. .      PHYSICAL EXAM  /70 (Cuff Site: Right Arm, Position: Sitting, Cuff Size: Adult Large)  Pulse 72  Wt 72.3 kg (159 lb 8 oz)  BMI 24.61 kg/m2    GENERAL: Healthy appearing patient in no acute distress. Pleasant and cooperative with exam and interview.   HEENT: Head-normocephalic. Eyes-no scleral icterus, pupils equal, round, and reactive to light. Nose-no nasal drainage. No lesions. Mouth-oral mucosa pink and moist, no lesions.  NECK: Supple. No thyroid nodules. Trachea midline.  LYMPHATICS:  No cervical, axillary or supraclavicular adenopathy.  CV: Regular rate and rhythm, no murmurs. No peripheral edema.  LUNGS:  No respiratory distress. Clear bilaterally to auscultation.  ABDOMEN: Non distended. Bowel sounds active. Soft, non-tender, no hepatosplenomegaly or umbilical hernia. No peritoneal signs.  : right inguinal hernia noted. Reducible,no tenderness. No hernia noted on left. Testicles descended bilaterally. No tenderness or mass bilaterally.  SKIN: Pink, warm and dry. No jaundice. No rash.  NEURO:  Cranial nerves II-XII grossly intact. Alert and oriented.  PSYCH: Appropriate mood and affect.      Aurora Verdin MD

## 2018-02-13 NOTE — PROGRESS NOTES
Subjective  Sandro Yoon is a 72 year old male who presents for rash all over body.  Been present for about a week.  Scattered throughout the chest and abdomen.  Very itchy.  The only new medication is Centrum Silver.  He saw that has iodine and there but he does not know that he is allergic to iodine or not.  He also started exercising at [employer] in the pool.  No recent hotel stay.  No known sick contacts.  No new detergents, lotions or shampoos.  No sneezing.  He has chronic rhinorrhea.  He tried Benadryl 6.25 mg last night before bed which helped with his itching.    Problem List/PMH: reviewed in EMR, and made relevant updates today.  Medications: reviewed in EMR, and made relevant updates today.  Allergies: reviewed in EMR, and made relevant updates today.    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    08/07:    Eight minutes Nick protocol stress echo normal.    Never smoked. No coffee.  One soda daily.  Rare alcohol.    Likes to cross country ski and bike. .     I reviewed social history and made relevant updates today.    Family Hx:   Family History   Problem Relation Age of Onset     HEART DISEASE Mother      Heart Disease,CAD     HEART DISEASE Father      Heart Disease,CAD     Prostate Cancer Father 80     Cancer-prostate     HEART DISEASE Brother      Heart Disease,3 of 4 have CAD     Prostate Cancer Brother 40     Cancer-prostate     Other - See Comments Brother      CABG [facility] young age     Prostate Cancer Brother 65     Cancer-prostate     Other - See Comments Brother      no heart disease     Other - See Comments Brother      CABG [facility] young age 40s     Colon Cancer No family hx of      Cancer-colon     Anesthesia Reaction No family hx of      Anesthesia Problem  "    Blood Disease No family hx of      Blood Disease,no bleeding or clotting       Objective  Vitals: reviewed in EMR.  /72 (BP Location: Right arm, Patient Position: Sitting, Cuff Size: Adult Large)  Pulse 76  Temp 96.9  F (36.1  C) (Tympanic)  Ht 5' 7.5\" (1.715 m)  Wt 159 lb (72.1 kg)  BMI 24.54 kg/m2    Gen: Pleasant male, NAD.  HEENT: MMM  Neck: Supple  Pulm: Breathing easily  Neuro: Grossly intact  Skin: Scattered papules over the torso.  Psychiatric: Normal affect and insight. Does not appear anxious or depressed.        Assessment    ICD-10-CM    1. Dermatitis L30.9 predniSONE (DELTASONE) 20 MG tablet       I think the most likely etiology would be from dry skin related to the swimming pool however differential diagnosis also includes adverse medication reaction, secondary to allergic rhinitis, contact dermatitis of something months, food allergy, others.  He requested short course of prednisone and it is reasonable, will just try the lowest effective dose hopefully 20 mg ×3 days will significantly improve his symptoms.  Warning signs were discussed.    Plan   -- Expected clinical course discussed   -- Medications and their side effects discussed  Patient Instructions    -- Prednisone x 3 days   -- Topical steroid for particularly itchy spot   -- Liberally use a fragrance free moisturizer all over   -- Avoid the pool briefly      Contact Dermatitis  Contact dermatitis is a skin rash caused by something that touches the skin and makes it irritated and inflamed. Your skin may be red, swollen, dry, and may be cracked. Blisters may form and ooze. The rash will itch.  Contact dermatitis can form on the face and neck, backs of hands, forearms, genitals, and lower legs.  People can get contact dermatitis from lots of sources. These include:    Plants such as poison ivy, oak, or sumac    Chemicals in hair dyes and rinses, soaps, solvents, waxes, fingernail polish, and deodorants     Jewelry or watchbands " "made of nickel  Contact dermatitis is not passed from person to person.  Talk with your healthcare provider about what may have caused the rash. A type of allergy testing called \"patch testing\" may be used to discover what you are allergic to. You will need to avoid the source of your rash in the future to prevent it from coming back.  Treatment is done to relieve itching and prevent the rash from coming back. The rash should go away in a few days to a few weeks.  Home care  Your healthcare provider may prescribe medicine to relieve swelling and itching. Follow all instructions when using these medicines.  General care:    Avoid anything that heats up your skin, such as hot showers or baths, or direct sunlight. This can make itching worse.    Apply cold compresses to soothe your sores to help relieve your symptoms. Do this for 30 minutes 3 to 4 times a day. You can make a cold compress by soaking a cloth in cold water. Squeeze out excess water. You can add colloidal oatmeal to the water to help reduce itching. For severe itching in a small area, apply an ice pack wrapped in a thin towel. Do this for 20 minutes 3 to 4 times a day.    You can also try wet dressings. One way to do this is to wear a wet piece of clothing under a dry one. Wear a damp shirt under a dry shirt if your upper body is affected. This can relieve itching and prevent you from scratching the affected area.    You can also help relieve large areas of itching by taking a lukewarm bath with colloidal oatmeal added to the water.    Use hydrocortisone cream for redness and irritation, unless another medicine was prescribed. You can also use benzocaine anesthetic cream or spray. Calamine lotion can also relieve mild symptoms.    Use oral diphenhydramine to help reduce itching. You can buy this antihistamine at drug and grocery stores. It can make you sleepy, so use lower doses during the daytime. Or you can use loratadine. This is an antihistamine that " will not make you sleepy. Do not use diphenhydramine if you have glaucoma or have trouble urinating due to an enlarged prostate.    If a plant causes your rash, make sure to wash your skin and the clothes you were wearing when you came into contact with the plant. This is to wash away the plant oils that gave you the rash and prevent more or worse symptoms.    Stay away from the substance or object that causes your symptoms. If you can t avoid it, wear gloves or some other type of protection.  Follow-up care  Follow up with your healthcare provider, or as advised.  When to seek medical advice  Call your healthcare provider right away if any of these occur:    Spreading of the rash to other parts of your body    Severe swelling of your face, eyelids, mouth, throat or tongue    Trouble urinating due to swelling in the genital area    Fever of 100.4 F (38 C) or higher    Redness or swelling that gets worse    Pain that gets worse    Foul-smelling fluid leaking from the skin    Yellow-brown crusts on the open blisters  Date Last Reviewed: 9/1/2016 2000-2017 [employer] [address] [place] [address]. All rights reserved. This information is not intended as a substitute for professional medical care. Always follow your healthcare professional's instructions.          Signed, Guero Turner MD  Internal Medicine & Pediatrics

## 2018-04-09 ENCOUNTER — OFFICE VISIT (OUTPATIENT)
Dept: PEDIATRICS | Facility: OTHER | Age: 73
End: 2018-04-09
Attending: INTERNAL MEDICINE
Payer: COMMERCIAL

## 2018-04-09 VITALS
DIASTOLIC BLOOD PRESSURE: 74 MMHG | WEIGHT: 159.5 LBS | BODY MASS INDEX: 24.61 KG/M2 | SYSTOLIC BLOOD PRESSURE: 116 MMHG | HEART RATE: 70 BPM

## 2018-04-09 DIAGNOSIS — M76.32 ILIOTIBIAL BAND SYNDROME, LEFT: ICD-10-CM

## 2018-04-09 DIAGNOSIS — M54.16 LUMBAR RADICULOPATHY: Primary | ICD-10-CM

## 2018-04-09 PROCEDURE — G0463 HOSPITAL OUTPT CLINIC VISIT: HCPCS

## 2018-04-09 PROCEDURE — 99213 OFFICE O/P EST LOW 20 MIN: CPT | Performed by: INTERNAL MEDICINE

## 2018-04-09 ASSESSMENT — PAIN SCALES - GENERAL: PAINLEVEL: MODERATE PAIN (5)

## 2018-04-09 NOTE — MR AVS SNAPSHOT
After Visit Summary   4/9/2018    Sandro Yoon    MRN: [id number]           Patient Information     Date Of Birth          1945        Visit Information        Provider Department      4/9/2018 9:00 AM Guero Turner MD [facility]        Today's Diagnoses     Lumbar radiculopathy    -  1    Iliotibial band syndrome, left           Follow-ups after your visit        Additional Services     PHYSICAL THERAPY REFERRAL       Freda Porter            PHYSICAL THERAPY REFERRAL       Rosa Hodge                  Who to contact     If you have questions or need follow up information about today's clinic visit or your schedule please contact [facility] directly at [phone].  Normal or non-critical lab and imaging results will be communicated to you by [employer], letter or phone within 4 business days after the clinic has received the results. If you do not hear from us within 7 days, please contact the clinic through [employer] or phone. If you have a critical or abnormal lab result, we will notify you by phone as soon as possible.  Submit refill requests through new test company or call your pharmacy and they will forward the refill request to us. Please allow 3 business days for your refill to be completed.          Additional Information About Your Visit        MyChart Information     new [employer] company gives you secure access to your electronic health record. If you see a primary care provider, you can also send messages to your care team and make appointments. If you have questions, please call your primary care clinic.  If you do not have a primary care provider, please call [phone] and they will assist you.        Care EveryWhere ID     This is your Care EveryWhere ID. This could be used by other organizations to access your [facility] medical records  [id number]        Your Vitals Were     Pulse BMI (Body Mass Index)                70 24.61 kg/m2            Blood Pressure from Last 3 Encounters:   04/09/18 116/74   02/13/18 116/72   01/30/18 120/70    Weight from Last 3 Encounters:   04/09/18 159 lb 8 oz (72.3 kg)   02/13/18 159 lb (72.1 kg)   01/30/18 159 lb 8 oz (72.3 kg)              We Performed the Following     PHYSICAL THERAPY REFERRAL     PHYSICAL THERAPY REFERRAL        Primary Care Provider Office Phone # Fax #    Guero Jono Turner [phone]       [address] [facility] [address]        Equal Access to Services     [facility]: Hadii aad ku hadasho Soomaali, waaxda luqadaha, qaybta kaalmada roberto, noble parada . [facility] [phone].    ATENCIÓN: Si habla español, tiene a robertson disposición servicios gratuitos de asistencia lingüística. [facility] [phone].    We comply with applicable federal civil rights laws and [place] laws. We do not discriminate on the basis of race, color, national origin, age, disability, sex, sexual orientation, or gender identity.            Thank you!     Thank you for choosing [facility] AND [facility]  for your care. Our goal is always to provide you with excellent care. Hearing back from our patients is one way we can continue to improve our services. Please take a few minutes to complete the written survey that you may receive in the mail after your visit with us. Thank you!             Your Updated Medication List - Protect others around you: Learn how to safely use, store and throw away your medicines at www.disposemymeds.org.          This list is accurate as of 4/9/18  9:15 AM.  Always use your most recent med list.                   Brand Name Dispense Instructions for use Diagnosis    acetaminophen 500 MG tablet    TYLENOL     Take 500 mg by mouth every 6 hours as needed Take 1 tablet by mouth every 6 hours if needed. Max acetaminophen dose: 4000mg in 24 hrs        aspirin 81 MG chewable tablet      Take 81 mg by mouth daily with food Take 1  tablet by mouth once daily with a meal.        atorvastatin 10 MG tablet    LIPITOR     Take 5 mg by mouth At Bedtime Take 0.5 tablets by mouth at bedtime. 1/2 tab daily        fluorouracil 5 % cream    EFUDEX     Apply 1 Application topically At Bedtime APPLY ONCE DAILY AT BEDTIME FOR 4 WEEKS. WASH OFF IN THE MORNING        fluticasone 50 MCG/ACT spray    FLONASE     Spray 2 sprays in nostril daily Inhale 2 Sprays into both nostrils once daily.        levothyroxine 100 MCG tablet    SYNTHROID/LEVOTHROID     Take 100 mcg by mouth every morning (before breakfast) Take 1 tablet by mouth before breakfast.        metoprolol succinate 50 MG 24 hr tablet    TOPROL-XL     Take 50 mg by mouth daily Take 1 tablet by mouth once daily.        naproxen sodium 220 MG tablet    ANAPROX     Take 220 mg by mouth as needed Take 220 mg by mouth one time if needed.        ramipril 2.5 MG capsule    ALTACE     Take 2.5 mg by mouth daily Take 1 capsule by mouth once daily.        sildenafil 20 MG tablet    REVATIO     Take 20-40 mg by mouth as needed Take 1-2 tablets by mouth once daily if needed for Other (Specify) (30 min prior to sexual activity).        triamcinolone 0.1 % ointment    KENALOG     Apply 1 Application topically 3 times daily Apply  topically to affected area(s) 3 times daily.

## 2018-04-09 NOTE — NURSING NOTE
Patient presents to clinic for referral to PT for the bilateral hip pain.  Leesa Squires LPN ....................  4/9/2018   8:53 AM

## 2018-04-09 NOTE — PROGRESS NOTES
"Subjective  Sandro Yoon is a 73 year old male who presents for referral for PT.  He's been having more pain in the left thigh.  \"It's airplane hip.\"  Worse with move to the side, or trying to do the seema position in yoga and better with rest. No trauma.  Recent trip to [place].    Problem List/PMH: reviewed in EMR, and made relevant updates today.  Medications: reviewed in EMR, and made relevant updates today.  Allergies: reviewed in EMR, and made relevant updates today.    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    08/07:    Eight minutes Nick protocol stress echo normal.    Never smoked. No coffee.  One soda daily.  Rare alcohol.    Likes to cross country ski and bike. .     I reviewed social history and made relevant updates today.    Family Hx:   Family History   Problem Relation Age of Onset     HEART DISEASE Mother      Heart Disease,CAD     HEART DISEASE Father      Heart Disease,CAD     Prostate Cancer Father 80     Cancer-prostate     HEART DISEASE Brother      Heart Disease,3 of 4 have CAD     Prostate Cancer Brother 40     Cancer-prostate     Other - See Comments Brother      CABG sdc young age     Prostate Cancer Brother 65     Cancer-prostate     Other - See Comments Brother      no heart disease     Other - See Comments Brother      CABG sdc young age 40s     Colon Cancer No family hx of      Cancer-colon     Anesthesia Reaction No family hx of      Anesthesia Problem     Blood Disease No family hx of      Blood Disease,no bleeding or clotting       Objective  Vitals: reviewed in EMR.  /74 (BP Location: Right arm, Patient Position: Sitting, Cuff Size: Adult Large)  Pulse 70  Wt 159 lb 8 oz (72.3 kg)  BMI 24.61 kg/m2    Gen: Pleasant male, NAD.  HEENT: " MMM  Neck: Supple  Pulm: Breathing easily  Neuro: Grossly intact  Skin: No concerning lesions.  Psychiatric: Normal affect and insight. Does not appear anxious or depressed.  Msk: Mild tenderness to palpation over the left leg proximal fibula without crepitus.  Positive straight leg raise on the left, equivocal test with abduction over the midline.      Assessment    ICD-10-CM    1. Lumbar radiculopathy M54.16 PHYSICAL THERAPY REFERRAL     CANCELED: PHYSICAL THERAPY REFERRAL   2. Iliotibial band syndrome, left M76.32 PHYSICAL THERAPY REFERRAL     CANCELED: PHYSICAL THERAPY REFERRAL     Orders Placed This Encounter   Procedures     PHYSICAL THERAPY REFERRAL       I think he has a component of both lumbar radiculopathy and iliotibial band syndrome.    Plan   -- Expected clinical course discussed   -- Referral to PT    Signed, Guero Turner MD  Internal Medicine & Pediatrics

## 2018-04-12 ENCOUNTER — HOSPITAL ENCOUNTER (OUTPATIENT)
Dept: PHYSICAL THERAPY | Facility: OTHER | Age: 73
Setting detail: THERAPIES SERIES
End: 2018-04-12
Attending: INTERNAL MEDICINE
Payer: MEDICARE

## 2018-04-12 PROCEDURE — G8979 MOBILITY GOAL STATUS: HCPCS | Mod: GP,CJ

## 2018-04-12 PROCEDURE — 97112 NEUROMUSCULAR REEDUCATION: CPT | Mod: GP

## 2018-04-12 PROCEDURE — 40000185 ZZHC STATISTIC PT OUTPT VISIT

## 2018-04-12 PROCEDURE — 97161 PT EVAL LOW COMPLEX 20 MIN: CPT | Mod: GP

## 2018-04-12 PROCEDURE — G8978 MOBILITY CURRENT STATUS: HCPCS | Mod: GP,CK

## 2018-04-12 NOTE — PROGRESS NOTES
[facility]          OUTPATIENT PHYSICAL THERAPY ORTHOPEDIC EVALUATION  PLAN OF TREATMENT FOR OUTPATIENT REHABILITATION  (COMPLETE FOR INITIAL CLAIMS ONLY)  Patient's Last Name, First Name, M.I.  YOB: 1945  Sandro Yoon    Provider s Name:  [facility]   Medical Record No.  [id number]   Start of Care Date:  04/12/18   Onset Date:  04/09/18   Type:     _X__PT   ___OT   ___SLP Medical Diagnosis:  lumbar radiculopathy, iliotibial band syndrome left     PT Diagnosis:  left leg pain, lumbar segmental dysfunction, myofascial tightness, muscle weakness, right shoulder pain   Visits from SOC:  1      _________________________________________________________________________________  Plan of Treatment/Functional Goals:  manual therapy, neuromuscular re-education, strengthening, stretching     Cryotherapy     Goals  Goal Identifier: Shoulder  Goal Description: Patient to return to pain free right shoulder use.  Target Date: 07/05/18    Goal Identifier: walking  Goal Description: Pateint to return to pain free walking with normal lumbar/pelvic mechanics.  Target Date: 07/05/18    Goal Identifier: HEP  Goal Description: Patient to be independent with HEP for symptoms management.  Target Date: 07/05/18                                                           Therapy Frequency:  2 times/Week  Predicted Duration of Therapy Intervention:  12 weeks    Sweetie Hodge, PT                 I CERTIFY THE NEED FOR THESE SERVICES FURNISHED UNDER        THIS PLAN OF TREATMENT AND WHILE UNDER MY CARE     (Physician co-signature of this document indicates review and certification of the therapy plan).                       Certification Date From:  04/12/18   Certification Date To:  07/05/18    Referring Provider:  Guero Turner MD    Initial Assessment        See Epic Evaluation Start of Care Date: 04/12/18

## 2018-04-12 NOTE — PROGRESS NOTES
04/12/18 1200   General Information   Type of Visit Initial OP Ortho PT Evaluation   Start of Care Date 04/12/18   Referring Physician Guero Turner MD   Orders Evaluate and Treat   Date of Order 04/09/18   Insurance Type Medicare   Medical Diagnosis lumbar radiculopathy, IT band syndrome left   Surgical/Medical history reviewed Yes   Presentation and Etiology   Pertinent history of current problem (include personal factors and/or comorbidities that impact the POC) Unknown etiology, started having left out hip pain, leg pain. Had issues with right side in the past, was successful with PT. Fell one month ago on steps, this made everything feel worse. Also strained right shoulder during fall as he caught hand rail on steps   Impairments A. Pain;D. Decreased ROM;F. Decreased strength and endurance   Functional Limitations perform activities of daily living   Symptom Location left outer hip, left leg, low back, right shoulder   Onset date of current episode/exacerbation 04/09/18   Chronicity Chronic   Pain rating (0-10 point scale) Best (/10);Worst (/10)   Best (/10) 4   Worst (/10) 6   Pain quality C. Aching;D. Burning   Frequency of pain/symptoms C. With activity   Pain/symptoms are: The same all the time   Pain/symptoms exacerbated by A. Sitting;B. Walking   Pain/symptoms eased by E. Changing positions;G. Heat   Progression of symptoms since onset: Worsened   Prior Level of Function   Prior Level of Function-Mobility no issues with walking or sitting   Current Level of Function   Patient role/employment history F. Retired   Fall Risk Screen   Fall screen completed by PT   Have you fallen 2 or more times in the past year? No   Have you fallen and had an injury in the past year? Yes   Timed Up and Go score (seconds) 8   Is patient a fall risk? No   Lumbar Spine/SI Objective Findings   Gait/Locomotion antalgic on left leg   Hip Screen + MARY left   Hamstring Flexibility tight right and left   Lumbar/SI Special Tests  Comments + FFT left   Segmental Mobility FRS right L4, ERS right L4, FRS left L5, left superior pubic shear   Palpation limited loading through left and right shoulders, left pelvis   Shoulder Objective Findings   Side (if bilateral, select both right and left) Right   Observation forward position   Shoulder ROM Comment fulll motion   Pec Minor (supine) Flexibility tight   Shoulder Special Tests Comments - empty can test   Palpation tender right pec   Accessory Motion/Joint Mobility crepitus with AC joints but normal movement   Right Shoulder ER Strength 3+/5   Right Shoulder IR Strength 4-/5   Planned Therapy Interventions   Planned Therapy Interventions manual therapy;neuromuscular re-education;strengthening;stretching   Planned Modality Interventions   Planned Modality Interventions Cryotherapy   Clinical Impression   Criteria for Skilled Therapeutic Interventions Met yes, treatment indicated   PT Diagnosis left leg pain, lumbar segmental dysfunction, myofascial tightness, muscle weakness, right shoulder pain   Influenced by the following impairments pain, weakness, loss of flexibility   Functional limitations due to impairments sitting, walking, yoga   Clinical Presentation Stable/Uncomplicated   Clinical Decision Making (Complexity) Low complexity   Therapy Frequency 2 times/Week   Predicted Duration of Therapy Intervention (days/wks) 12 weeks   Risk & Benefits of therapy have been explained Yes   Patient, Family & other staff in agreement with plan of care Yes   Clinical Impression Comments Chronic low back and left hip/leg pain further exacerbated by trip on steps, caught self with right arm.   ORTHO GOALS   PT Ortho Eval Goals 1;2;3   Ortho Goal 1   Goal Identifier Shoulder   Goal Description Patient to return to pain free right shoulder use.   Target Date 07/05/18   Ortho Goal 2   Goal Identifier walking   Goal Description Pateint to return to pain free walking with normal lumbar/pelvic mechanics.   Target  Date 07/05/18   Ortho Goal 3   Goal Identifier HEP   Goal Description Patient to be independent with HEP for symptoms management.   Target Date 07/05/18   Total Evaluation Time   Total Evaluation Time 20   Therapy Certification   Certification date from 04/12/18   Certification date to 07/05/18   Medical Diagnosis lumbar radiculopathy, iliotibial band syndrome left

## 2018-04-30 ENCOUNTER — HOSPITAL ENCOUNTER (OUTPATIENT)
Dept: PHYSICAL THERAPY | Facility: OTHER | Age: 73
Setting detail: THERAPIES SERIES
End: 2018-04-30
Attending: INTERNAL MEDICINE
Payer: MEDICARE

## 2018-04-30 PROCEDURE — 97140 MANUAL THERAPY 1/> REGIONS: CPT | Mod: GP

## 2018-04-30 PROCEDURE — 97112 NEUROMUSCULAR REEDUCATION: CPT | Mod: GP

## 2018-04-30 PROCEDURE — 40000185 ZZHC STATISTIC PT OUTPT VISIT

## 2018-05-02 ENCOUNTER — HOSPITAL ENCOUNTER (OUTPATIENT)
Dept: PHYSICAL THERAPY | Facility: OTHER | Age: 73
Setting detail: THERAPIES SERIES
End: 2018-05-02
Attending: INTERNAL MEDICINE
Payer: MEDICARE

## 2018-05-02 PROCEDURE — 40000185 ZZHC STATISTIC PT OUTPT VISIT

## 2018-05-02 PROCEDURE — 97112 NEUROMUSCULAR REEDUCATION: CPT | Mod: GP

## 2018-05-07 ENCOUNTER — HOSPITAL ENCOUNTER (OUTPATIENT)
Dept: PHYSICAL THERAPY | Facility: OTHER | Age: 73
Setting detail: THERAPIES SERIES
End: 2018-05-07
Attending: INTERNAL MEDICINE
Payer: MEDICARE

## 2018-05-07 PROCEDURE — 97112 NEUROMUSCULAR REEDUCATION: CPT | Mod: GP

## 2018-05-07 PROCEDURE — 40000185 ZZHC STATISTIC PT OUTPT VISIT

## 2018-05-09 ENCOUNTER — HOSPITAL ENCOUNTER (OUTPATIENT)
Dept: PHYSICAL THERAPY | Facility: OTHER | Age: 73
Setting detail: THERAPIES SERIES
End: 2018-05-09
Attending: INTERNAL MEDICINE
Payer: MEDICARE

## 2018-05-09 PROCEDURE — 97112 NEUROMUSCULAR REEDUCATION: CPT | Mod: GP

## 2018-05-09 PROCEDURE — 40000185 ZZHC STATISTIC PT OUTPT VISIT

## 2018-05-14 ENCOUNTER — HOSPITAL ENCOUNTER (OUTPATIENT)
Dept: PHYSICAL THERAPY | Facility: OTHER | Age: 73
Setting detail: THERAPIES SERIES
End: 2018-05-14
Attending: INTERNAL MEDICINE
Payer: MEDICARE

## 2018-05-14 PROCEDURE — 97112 NEUROMUSCULAR REEDUCATION: CPT | Mod: GP

## 2018-05-14 PROCEDURE — 40000185 ZZHC STATISTIC PT OUTPT VISIT

## 2018-05-14 PROCEDURE — 97140 MANUAL THERAPY 1/> REGIONS: CPT | Mod: GP

## 2018-05-16 ENCOUNTER — HOSPITAL ENCOUNTER (OUTPATIENT)
Dept: PHYSICAL THERAPY | Facility: OTHER | Age: 73
Setting detail: THERAPIES SERIES
End: 2018-05-16
Attending: INTERNAL MEDICINE
Payer: MEDICARE

## 2018-05-16 PROCEDURE — G8980 MOBILITY D/C STATUS: HCPCS | Mod: GP,CJ

## 2018-05-16 PROCEDURE — G8979 MOBILITY GOAL STATUS: HCPCS | Mod: GP,CJ

## 2018-05-16 PROCEDURE — 40000185 ZZHC STATISTIC PT OUTPT VISIT

## 2018-05-16 PROCEDURE — 97112 NEUROMUSCULAR REEDUCATION: CPT | Mod: GP

## 2018-07-11 NOTE — PROGRESS NOTES
Outpatient Physical Therapy Discharge Note     Patient: Sandro Yoon  : 1945    Beginning/End Dates of Reporting Period:  18 to 2018    Referring Provider: Guero Turner MD    Therapy Diagnosis: left leg pain, lumbar segmental dysfunction, myofascial tightness, muscle weakness, right shoulder pain     Client Self Report: Left outer hip is better, will feel it if he pushes on it.     Objective Measurements:  Objective Measure: tissue loading  Details: limited through left shoulder, general listening to left lower thoracic, splenic flexure  Objective Measure: joint mobility  Details: ERS left T10, T7        Goals:  Goal Identifier Shoulder   Goal Description Patient to return to pain free right shoulder use.   Target Date 18   Date Met      Progress:     Goal Identifier walking   Goal Description Pateint to return to pain free walking with normal lumbar/pelvic mechanics.   Target Date 18   Date Met   18   Progress:     Goal Identifier HEP   Goal Description Patient to be independent with HEP for symptoms management.   Target Date 18   Date Met   18   Progress:       Progress Toward Goals:   Progress this reporting period: reduced pain with walking and shoulder use.          Plan:  Discharge from therapy.    Discharge:    Reason for Discharge: Patient has met all goals.  Patient chooses to discontinue therapy.    Equipment Issued: NA    Discharge Plan: Patient to continue home program.

## 2018-07-11 NOTE — ADDENDUM NOTE
Encounter addended by: Sweetie Hodge, PT on: 7/11/2018  1:33 PM<BR>     Actions taken: Pend clinical note, Flowsheet accepted, Sign clinical note, Charge Capture section accepted, Episode resolved

## 2018-07-23 ENCOUNTER — TELEPHONE (OUTPATIENT)
Dept: PEDIATRICS | Facility: OTHER | Age: 73
End: 2018-07-23

## 2018-07-23 NOTE — TELEPHONE ENCOUNTER
Would you like to use two same days for this patient tomorrow or Thursday?  Leesa Squires LPN ....................  7/23/2018   1:21 PM

## 2018-07-23 NOTE — TELEPHONE ENCOUNTER
See if he can come tomorrow 2628, 15 is fine.    Signed, Guero Turner MD  Internal Medicine & Pediatrics

## 2018-07-23 NOTE — TELEPHONE ENCOUNTER
Patient notified of the information below after verification of name and date of birth.   Leesa Squires LPN ....................  7/23/2018   2:56 PM

## 2018-07-23 NOTE — TELEPHONE ENCOUNTER
[facility] - Patient requesting an appointment before 7/30/18. Patient coming in to discuss heart medication. Patient finding it more difficult to walk any distance.  Please call.

## 2018-07-24 ENCOUNTER — TELEPHONE (OUTPATIENT)
Dept: PEDIATRICS | Facility: OTHER | Age: 73
End: 2018-07-24

## 2018-07-24 ENCOUNTER — OFFICE VISIT (OUTPATIENT)
Dept: PEDIATRICS | Facility: OTHER | Age: 73
End: 2018-07-24
Attending: INTERNAL MEDICINE
Payer: COMMERCIAL

## 2018-07-24 VITALS
BODY MASS INDEX: 24.38 KG/M2 | TEMPERATURE: 96.8 F | HEART RATE: 62 BPM | SYSTOLIC BLOOD PRESSURE: 120 MMHG | DIASTOLIC BLOOD PRESSURE: 64 MMHG | OXYGEN SATURATION: 96 % | WEIGHT: 158 LBS

## 2018-07-24 DIAGNOSIS — I25.10 ASCVD (ARTERIOSCLEROTIC CARDIOVASCULAR DISEASE): ICD-10-CM

## 2018-07-24 DIAGNOSIS — I20.89 ATYPICAL ANGINA (H): Primary | ICD-10-CM

## 2018-07-24 DIAGNOSIS — Z95.820 S/P ANGIOPLASTY WITH STENT: ICD-10-CM

## 2018-07-24 PROCEDURE — 99214 OFFICE O/P EST MOD 30 MIN: CPT | Mod: 25 | Performed by: INTERNAL MEDICINE

## 2018-07-24 PROCEDURE — 93005 ELECTROCARDIOGRAM TRACING: CPT | Performed by: INTERNAL MEDICINE

## 2018-07-24 PROCEDURE — 93010 ELECTROCARDIOGRAM REPORT: CPT | Performed by: INTERNAL MEDICINE

## 2018-07-24 PROCEDURE — G0463 HOSPITAL OUTPT CLINIC VISIT: HCPCS | Mod: 25

## 2018-07-24 RX ORDER — NITROGLYCERIN 0.4 MG/1
TABLET SUBLINGUAL
Qty: 25 TABLET | Refills: 3 | Status: SHIPPED | OUTPATIENT
Start: 2018-07-24 | End: 2019-05-30

## 2018-07-24 ASSESSMENT — ANXIETY QUESTIONNAIRES
1. FEELING NERVOUS, ANXIOUS, OR ON EDGE: SEVERAL DAYS
5. BEING SO RESTLESS THAT IT IS HARD TO SIT STILL: NOT AT ALL
7. FEELING AFRAID AS IF SOMETHING AWFUL MIGHT HAPPEN: NOT AT ALL
IF YOU CHECKED OFF ANY PROBLEMS ON THIS QUESTIONNAIRE, HOW DIFFICULT HAVE THESE PROBLEMS MADE IT FOR YOU TO DO YOUR WORK, TAKE CARE OF THINGS AT HOME, OR GET ALONG WITH OTHER PEOPLE: NOT DIFFICULT AT ALL
6. BECOMING EASILY ANNOYED OR IRRITABLE: NOT AT ALL
GAD7 TOTAL SCORE: 3
3. WORRYING TOO MUCH ABOUT DIFFERENT THINGS: SEVERAL DAYS
2. NOT BEING ABLE TO STOP OR CONTROL WORRYING: SEVERAL DAYS

## 2018-07-24 ASSESSMENT — PAIN SCALES - GENERAL: PAINLEVEL: MILD PAIN (3)

## 2018-07-24 ASSESSMENT — PATIENT HEALTH QUESTIONNAIRE - PHQ9: 5. POOR APPETITE OR OVEREATING: NOT AT ALL

## 2018-07-24 NOTE — MR AVS SNAPSHOT
After Visit Summary   7/24/2018    Sandro Yoon    MRN: [id number]           Patient Information     Date Of Birth          1945        Visit Information        Provider Department      7/24/2018 7:45 AM Guero Turner MD [facility]        Today's Diagnoses     Atypical angina (H)    -  1    ASCVD (arteriosclerotic cardiovascular disease)        S/P angioplasty with stent           Follow-ups after your visit        Additional Services     CARDIOLOGY EVAL ADULT REFERRAL       Jos or any                  Your next 10 appointments already scheduled     Aug 02, 2018  8:00 AM CDT   GH STRESS with  Cr Nurse, GH STRESS ROOM 1   [facility] ([facility])    [address]  [place] [address]   [phone]            Aug 02, 2018  8:30 AM CDT   Ech Stress Test with GHECH1   [facility] ([facility])    [address]  [place] [address]   [phone]           1. Please bring or wear a comfortable two-piece outfit and walking shoes. 2. Stop eating 3 hours before the test. You may drink water or juice. 3. Stop all caffeine 12 hours before the test. This includes coffee, tea, soda pop, chocolate and certain medicines (such as Anacin and Excederin). Also avoid decaf coffee and tea, as these contain small amounts of caffeine. 4. No alcohol, smoking or use of other tobacco products for 12 hours before the test. 5. Refer to your provider instructions to see if you need to stop any medications (such as beta-blockers or nitrates) for this test. 6. For patients with diabetes: - If you take insulin, call your diabetes care team. Ask if you should take a   dose the morning of your test. - If you take diabetes medicine by mouth, dont take it on the morning of your test. Bring it with you to take after the test. (If you have questions, call your diabetes care team) 7.  When you arrive, please tell us if: - You have diabetes. - You have taken Viagra, Cialis or Levitra in the past 48 hours. 8. For any questions that cannot be answered, please contact the ordering physician 9. Please do not wear perfumes or scented lotions on the day of your exam.            Aug 02, 2018  8:45 AM CDT   Stress MD with Guero Turner MD   [facility] ([facility])    [facility] [address] [facility] [address]   [phone]           Please arrive at the time given for your first appointment. This visit is used internally to schedule the physician's time during your ultrasound.            Aug 09, 2018  1:15 PM CDT   New Visit with Tor Arguello DO   [facility] ([facility])    [facility] [address] [facility] [address]   [phone]            Aug 13, 2018  2:45 PM CDT   Stress MD with Guero Turner MD   [facility] ([facility])    [facility] [address] [facility] [address]   [phone]           Please arrive at the time given for your first appointment. This visit is used internally to schedule the physician's time during your ultrasound.              Future tests that were ordered for you today     Open Future Orders        Priority Expected Expires Ordered    Exercise Stress Echocardiogram Routine  7/24/2019 7/24/2018            Who to contact     If you have questions or need follow up information about today's clinic visit or your schedule please contact [facility] directly at [phone].  Normal or non-critical lab and imaging results will be communicated to you by MyChart, letter or phone within 4 business days after the clinic has received the results. If you do not hear from us within 7 days, please contact the clinic through MyChart or phone. If you have a critical or abnormal lab result, we will notify  you by phone as soon as possible.  Submit refill requests through [employer] or call your pharmacy and they will forward the refill request to us. Please allow 3 business days for your refill to be completed.          Additional Information About Your Visit        [employer] Information     [employer] gives you secure access to your electronic health record. If you see a primary care provider, you can also send messages to your care team and make appointments. If you have questions, please call your primary care clinic.  If you do not have a primary care provider, please call [phone] and they will assist you.        Care EveryWhere ID     This is your Care EveryWhere ID. This could be used by other organizations to access your [facility] medical records  [id number]        Your Vitals Were     Pulse Temperature Pulse Oximetry BMI (Body Mass Index)          62 96.8  F (36  C) (Tympanic) 96% 24.38 kg/m2         Blood Pressure from Last 3 Encounters:   07/24/18 120/64   04/09/18 116/74   02/13/18 116/72    Weight from Last 3 Encounters:   07/24/18 158 lb (71.7 kg)   04/09/18 159 lb 8 oz (72.3 kg)   02/13/18 159 lb (72.1 kg)              We Performed the Following     CARDIOLOGY EVAL ADULT REFERRAL     EKG 12-lead, tracing only          Today's Medication Changes          These changes are accurate as of 7/24/18  5:22 PM.  If you have any questions, ask your nurse or doctor.               Start taking these medicines.        Dose/Directions    nitroGLYcerin 0.4 MG sublingual tablet   Commonly known as:  NITROSTAT   Used for:  Atypical angina (H)   Started by:  Guero Turner MD        For chest pain place 1 tablet under the tongue every 5 minutes for 3 doses. If symptoms persist 5 minutes after 1st dose call 911.   Quantity:  25 tablet   Refills:  3            Where to get your medicines      These medications were sent to [facility] - [place], [place] - [address]  [address],  [place] [address]     Phone:  [phone]     nitroGLYcerin 0.4 MG sublingual tablet                Primary Care Provider Office Phone # Fax #    Guero Jono Turner [phone] [address]  [place] [address]        Equal Access to Services     SADAF GRAY : Hadii aad ku hadasho Soomaali, waaxda luqadaha, qaybta kaalmada adeegyada, waxay aliviain hayshanten kunal patemargaritosonia castanon. So [facility] [phone].    ATENCIÓN: Si habla español, tiene a robertson disposición servicios gratuitos de asistencia lingüística. Llame al [phone].    We comply with applicable federal civil rights laws and [place] laws. We do not discriminate on the basis of race, color, national origin, age, disability, sex, sexual orientation, or gender identity.            Thank you!     Thank you for choosing [facility] AND [facility]  for your care. Our goal is always to provide you with excellent care. Hearing back from our patients is one way we can continue to improve our services. Please take a few minutes to complete the written survey that you may receive in the mail after your visit with us. Thank you!             Your Updated Medication List - Protect others around you: Learn how to safely use, store and throw away your medicines at www.disposemymeds.org.          This list is accurate as of 7/24/18  5:22 PM.  Always use your most recent med list.                   Brand Name Dispense Instructions for use Diagnosis    acetaminophen 500 MG tablet    TYLENOL     Take 500 mg by mouth every 6 hours as needed Take 1 tablet by mouth every 6 hours if needed. Max acetaminophen dose: 4000mg in 24 hrs        aspirin 81 MG chewable tablet      Take 81 mg by mouth daily with food Take 1 tablet by mouth once daily with a meal.        atorvastatin 10 MG tablet    LIPITOR     Take 5 mg by mouth At Bedtime Take 0.5 tablets by mouth at bedtime. 1/2 tab daily        fluorouracil 5 % cream    EFUDEX     Apply 1  Application topically At Bedtime APPLY ONCE DAILY AT BEDTIME FOR 4 WEEKS. WASH OFF IN THE MORNING        fluticasone 50 MCG/ACT spray    FLONASE     Spray 2 sprays in nostril daily Inhale 2 Sprays into both nostrils once daily.        levothyroxine 100 MCG tablet    SYNTHROID/LEVOTHROID     Take 100 mcg by mouth every morning (before breakfast) Take 1 tablet by mouth before breakfast.        metoprolol succinate 50 MG 24 hr tablet    TOPROL-XL     Take 50 mg by mouth daily Take 1 tablet by mouth once daily.        naproxen sodium 220 MG tablet    ANAPROX     Take 220 mg by mouth as needed Take 220 mg by mouth one time if needed.        nitroGLYcerin 0.4 MG sublingual tablet    NITROSTAT    25 tablet    For chest pain place 1 tablet under the tongue every 5 minutes for 3 doses. If symptoms persist 5 minutes after 1st dose call 911.    Atypical angina (H)       ramipril 2.5 MG capsule    ALTACE     Take 2.5 mg by mouth daily Take 1 capsule by mouth once daily.        sildenafil 20 MG tablet    REVATIO     Take 20-40 mg by mouth as needed Take 1-2 tablets by mouth once daily if needed for Other (Specify) (30 min prior to sexual activity).        triamcinolone 0.1 % ointment    KENALOG     Apply 1 Application topically 3 times daily Apply  topically to affected area(s) 3 times daily.

## 2018-07-24 NOTE — TELEPHONE ENCOUNTER
[facility] - Patient would like to speak with nurse regarding question about visit. Patient wondering if they need to have nitroglycerin on hand.  Please call.

## 2018-07-24 NOTE — TELEPHONE ENCOUNTER
Patient notified of the information below after verification of name and date of birth.   Leesa Squires LPN ....................  7/24/2018   4:16 PM

## 2018-07-24 NOTE — NURSING NOTE
Patient presents to clinic for right jaw and shoulder pain along with cough and shortness of breath.   Leesa Squires LPN ....................  7/24/2018   7:38 AM

## 2018-07-24 NOTE — NURSING NOTE
The right ear canal was irrigated with body-temperature tap water with the jet of water directed superiorly.  The right ear canal was then re-examined and cleared of the impaction.  The patient tolerated the procedure well.  Leesa Squires LPN ....................  7/24/2018   8:15 AM

## 2018-07-24 NOTE — TELEPHONE ENCOUNTER
Do you want him to have nitroglycerin tabs on hand?  Leesa Squires LPN ....................  7/24/2018   9:53 AM

## 2018-07-24 NOTE — PROGRESS NOTES
Subjective  Sandro Yoon is a 73 year old male who presents for jaw pain.  Over the last week or so he has developed right-sided jaw pain.  He can point to the location where it hurts on the right anterolateral jawline.  It happens when he walks about 2 blocks.  Previously he was able to walk for quite a while including miles without the symptom.  In the past he has had stents placed and he had a similar pain including jaw pain and back pain.  He has had no back pain this time around.  No anterior chest pain.  He does feel shortness of breath at the same time.  Symptoms resolved with rest.  No symptoms at the time of this visit.  The jaw is not tender to palpation.  He has no tooth pain.  He did not have the symptoms while biking.  No heart palpitations.  No presyncope or syncope.    Problem List/PMH: reviewed in EMR, and made relevant updates today.  Medications: reviewed in EMR, and made relevant updates today.  Allergies: reviewed in EMR, and made relevant updates today.    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    08/07:    Eight minutes Nick protocol stress echo normal.    Never smoked. No coffee.  One soda daily.  Rare alcohol.    Likes to cross country ski and bike. .     I reviewed social history and made relevant updates today.    Family Hx:   Family History   Problem Relation Age of Onset     HEART DISEASE Mother      Heart Disease,CAD     HEART DISEASE Father      Heart Disease,CAD     Prostate Cancer Father 80     Cancer-prostate     HEART DISEASE Brother      Heart Disease,3 of 4 have CAD     Prostate Cancer Brother 40     Cancer-prostate     Other - See Comments Brother      CABG sdc young age     Prostate Cancer Brother 65     Cancer-prostate     Other - See  Comments Brother      no heart disease     Other - See Comments Brother      CABG sdc young age 40s     Colon Cancer No family hx of      Cancer-colon     Anesthesia Reaction No family hx of      Anesthesia Problem     Blood Disease No family hx of      Blood Disease,no bleeding or clotting       Objective  Vitals: reviewed in EMR.  /64 (BP Location: Right arm, Patient Position: Sitting, Cuff Size: Adult Large)  Pulse 62  Temp 96.8  F (36  C) (Tympanic)  Wt 158 lb (71.7 kg)  SpO2 96%  BMI 24.38 kg/m2    Gen: Pleasant male, NAD.  HEENT: MMM, no OP erythema.   Neck: Supple, no JVD, no bruits.  CV: RRR no m/r/g.   Pulm: CTAB no w/r/r  Neuro: Grossly intact  Msk: No lower extremity edema.  Skin: No concerning lesions.  Psychiatric: Normal affect and insight. Does not appear anxious or depressed.    Results for orders placed or performed in visit on 07/24/18   EKG 12-lead, tracing only    Narrative    EKG Interpretation:   Rhythm: Sinus  Rate: 73  Axis: Normal  Conduction: Prolongation of QTc, 451 ms.  QRS: Q waves in lead III, aVF  ST Segments: Normal  T-wave: Normal  Chambers: Normal  PACs Present: No  PVCs Present: Yes    Impression:   Abnormal EKG.  Inferior infarct pattern.  Frequent ventricular ectopy.  Prolongation of QTc.  Compared to July 26, 2017: Ectopy is new.    Signed, Guero Turner MD  Internal Medicine & Pediatrics  7/24/2018  5:19 PM       Labs:  Lab Results   Component Value Date    HGB 16.1 11/12/2015    HCT 50.6 11/12/2015     11/12/2015    TRIG 132 07/26/2017    HDL 52 07/26/2017     07/26/2017    BUN 20 07/26/2017    CO2 26 07/26/2017         Assessment    ICD-10-CM    1. Atypical angina (H) I20.8 EKG 12-lead, tracing only     Exercise Stress Echocardiogram     CARDIOLOGY EVAL ADULT REFERRAL   2. ASCVD (arteriosclerotic cardiovascular disease) I25.10 EKG 12-lead, tracing only     Exercise Stress Echocardiogram     CARDIOLOGY EVAL ADULT REFERRAL   3. S/P angioplasty with stent  Z95.9 EKG 12-lead, tracing only     Exercise Stress Echocardiogram     CARDIOLOGY EVAL ADULT REFERRAL     Orders Placed This Encounter   Procedures     CARDIOLOGY EVAL ADULT REFERRAL     EKG 12-lead, tracing only     Exercise Stress Echocardiogram       He has a strong family history of coronary disease, and I am concerned about the recurrence of symptoms similar but slightly different to his previous anginal equivalent.  Reassuringly symptoms resolved with rest.  Plan for stress test.  If symptoms worsen in the meantime recommend ER visit particularly if symptoms start with exertion and persist despite rest.  Stress testing is negative based on high pretest probability we may want to proceed with calcium scoring or other.  Cardiology consultation is requested.    Plan   -- Expected clinical course discussed   -- Medications and their side effects discussed   -- Stress test: treadmill echo   -- Cardiology consult    Signed, Guero Turner MD  Internal Medicine & Pediatrics

## 2018-07-25 ENCOUNTER — TELEPHONE (OUTPATIENT)
Dept: CARDIOLOGY | Facility: OTHER | Age: 73
End: 2018-07-25

## 2018-07-25 ASSESSMENT — PATIENT HEALTH QUESTIONNAIRE - PHQ9: SUM OF ALL RESPONSES TO PHQ QUESTIONS 1-9: 0

## 2018-07-25 ASSESSMENT — ANXIETY QUESTIONNAIRES: GAD7 TOTAL SCORE: 3

## 2018-07-25 NOTE — TELEPHONE ENCOUNTER
Patient verified .  Reminder call for stress test with instructions given.  Patient verbalized understanding. Changed pt appt for stress to 18 from 18.  Pt agreed to having test sooner and appt change made.

## 2018-07-26 ENCOUNTER — TELEPHONE (OUTPATIENT)
Dept: CARDIAC REHAB | Facility: OTHER | Age: 73
End: 2018-07-26

## 2018-07-26 NOTE — TELEPHONE ENCOUNTER
Patient called wanting to see if his appointment on 8/9/18 with Dr. Arguello could be moved up, as his stress test time was changed to 7/31/18, yesterday. We were able to get his stress test rescheduled to 7/30/18 at 10:00, and a consult with Dr. Arguello on 7/31/18 at 4:15. Dr. Turner and Dr. Arguello gave their ok with the changes. Patient was called back and test instructions reviewed and new appointment dates and times given. Patient verbalized understanding of new times and instructions.

## 2018-07-30 ENCOUNTER — HOSPITAL ENCOUNTER (OUTPATIENT)
Dept: CARDIOLOGY | Facility: OTHER | Age: 73
Discharge: HOME | End: 2018-07-30
Attending: INTERNAL MEDICINE | Admitting: INTERNAL MEDICINE
Payer: MEDICARE

## 2018-07-30 ENCOUNTER — HOSPITAL ENCOUNTER (OUTPATIENT)
Dept: CARDIOLOGY | Facility: OTHER | Age: 73
End: 2018-07-30
Attending: INTERNAL MEDICINE
Payer: MEDICARE

## 2018-07-30 ENCOUNTER — TELEPHONE (OUTPATIENT)
Dept: PEDIATRICS | Facility: OTHER | Age: 73
End: 2018-07-30

## 2018-07-30 VITALS — WEIGHT: 159 LBS | HEIGHT: 67 IN | BODY MASS INDEX: 24.96 KG/M2

## 2018-07-30 DIAGNOSIS — I20.89 ATYPICAL ANGINA (H): ICD-10-CM

## 2018-07-30 DIAGNOSIS — I25.10 ASCVD (ARTERIOSCLEROTIC CARDIOVASCULAR DISEASE): ICD-10-CM

## 2018-07-30 DIAGNOSIS — Z95.820 S/P ANGIOPLASTY WITH STENT: ICD-10-CM

## 2018-07-30 PROCEDURE — 93017 CV STRESS TEST TRACING ONLY: CPT

## 2018-07-30 PROCEDURE — 93350 STRESS TTE ONLY: CPT | Mod: 26 | Performed by: INTERNAL MEDICINE

## 2018-07-30 PROCEDURE — 25500064 ZZH RX 255 OP 636: Performed by: INTERNAL MEDICINE

## 2018-07-30 PROCEDURE — 93018 CV STRESS TEST I&R ONLY: CPT | Performed by: INTERNAL MEDICINE

## 2018-07-30 PROCEDURE — 93016 CV STRESS TEST SUPVJ ONLY: CPT | Performed by: INTERNAL MEDICINE

## 2018-07-30 PROCEDURE — 93325 DOPPLER ECHO COLOR FLOW MAPG: CPT | Mod: 26 | Performed by: INTERNAL MEDICINE

## 2018-07-30 PROCEDURE — 93321 DOPPLER ECHO F-UP/LMTD STD: CPT | Mod: 26 | Performed by: INTERNAL MEDICINE

## 2018-07-30 PROCEDURE — 93350 STRESS TTE ONLY: CPT | Mod: TC

## 2018-07-30 RX ADMIN — PERFLUTREN 7 ML: 6.52 INJECTION, SUSPENSION INTRAVENOUS at 11:45

## 2018-07-30 NOTE — TELEPHONE ENCOUNTER
I called and spoke with Mr. Yoon. Relayed positive stress echo.     -- Rest with angina   -- If angina persists with rest, call 911   -- See Dr. Arguello tomorrow as planned    Signed, Guero Turner MD  Internal Medicine & Pediatrics

## 2018-07-30 NOTE — PROGRESS NOTES
1000The patient arrived for a stress echo.  The procedure, risks and benefits were discussed and the consent was signed.  The patient was prepped for the stress test and an  IV was placed antecubital per protocol for definity use,and echo enhancement and the patient tolerated well and the echo sonographer did the initial images with definity for image enhancement.   Dr. Turner arrived, and the patient walked 6 minutes and 40seconds.  The patient developed right sided jaw pain at 6 min that resolved at rest/recovery.  Stress images were completed and the IV used for definity and echo enhancement was discontinued per protocol and the patinent  was released in stable condition.without further complaints of jaw/chest pain  Please see the chart for complete test results.

## 2018-07-31 ENCOUNTER — OFFICE VISIT (OUTPATIENT)
Dept: CARDIOLOGY | Facility: OTHER | Age: 73
End: 2018-07-31
Attending: INTERNAL MEDICINE
Payer: COMMERCIAL

## 2018-07-31 VITALS
BODY MASS INDEX: 23.4 KG/M2 | HEIGHT: 69 IN | WEIGHT: 158 LBS | DIASTOLIC BLOOD PRESSURE: 82 MMHG | HEART RATE: 72 BPM | SYSTOLIC BLOOD PRESSURE: 114 MMHG

## 2018-07-31 DIAGNOSIS — I10 ESSENTIAL HYPERTENSION: ICD-10-CM

## 2018-07-31 DIAGNOSIS — I25.10 ASCVD (ARTERIOSCLEROTIC CARDIOVASCULAR DISEASE): Primary | ICD-10-CM

## 2018-07-31 DIAGNOSIS — Z95.820 S/P ANGIOPLASTY WITH STENT: ICD-10-CM

## 2018-07-31 DIAGNOSIS — I25.118 CORONARY ARTERY DISEASE OF NATIVE ARTERY OF NATIVE HEART WITH STABLE ANGINA PECTORIS (H): ICD-10-CM

## 2018-07-31 DIAGNOSIS — Z95.5 HISTORY OF CORONARY ARTERY STENT PLACEMENT: ICD-10-CM

## 2018-07-31 DIAGNOSIS — E78.00 PURE HYPERCHOLESTEROLEMIA: ICD-10-CM

## 2018-07-31 DIAGNOSIS — Z98.890 HX OF CARDIAC CATH: ICD-10-CM

## 2018-07-31 PROCEDURE — 99205 OFFICE O/P NEW HI 60 MIN: CPT | Performed by: INTERNAL MEDICINE

## 2018-07-31 PROCEDURE — G0463 HOSPITAL OUTPT CLINIC VISIT: HCPCS | Mod: 25

## 2018-07-31 PROCEDURE — G0463 HOSPITAL OUTPT CLINIC VISIT: HCPCS

## 2018-07-31 RX ORDER — CLOPIDOGREL BISULFATE 75 MG/1
75 TABLET ORAL DAILY
Qty: 30 TABLET | Refills: 11 | Status: SHIPPED | OUTPATIENT
Start: 2018-07-31 | End: 2018-08-21

## 2018-07-31 ASSESSMENT — ANXIETY QUESTIONNAIRES
2. NOT BEING ABLE TO STOP OR CONTROL WORRYING: NOT AT ALL
1. FEELING NERVOUS, ANXIOUS, OR ON EDGE: NOT AT ALL
3. WORRYING TOO MUCH ABOUT DIFFERENT THINGS: NOT AT ALL
5. BEING SO RESTLESS THAT IT IS HARD TO SIT STILL: NOT AT ALL
6. BECOMING EASILY ANNOYED OR IRRITABLE: NOT AT ALL
7. FEELING AFRAID AS IF SOMETHING AWFUL MIGHT HAPPEN: NOT AT ALL
GAD7 TOTAL SCORE: 0
IF YOU CHECKED OFF ANY PROBLEMS ON THIS QUESTIONNAIRE, HOW DIFFICULT HAVE THESE PROBLEMS MADE IT FOR YOU TO DO YOUR WORK, TAKE CARE OF THINGS AT HOME, OR GET ALONG WITH OTHER PEOPLE: NOT DIFFICULT AT ALL

## 2018-07-31 ASSESSMENT — PAIN SCALES - GENERAL: PAINLEVEL: NO PAIN (0)

## 2018-07-31 ASSESSMENT — PATIENT HEALTH QUESTIONNAIRE - PHQ9: 5. POOR APPETITE OR OVEREATING: NOT AT ALL

## 2018-07-31 NOTE — PROGRESS NOTES
[facility] HEART CARE   CARDIOLOGY CONSULT     Sandro Yoon   1945  [id number]    Guero Turner     Chief Complaint   Patient presents with     Consult          HPI:   Mr. 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  He was found to have an abnormal stress echo on 7/30/18.  He has a history of LAD stenting ×2, CAD, hypertension, and hyperlipidemia.    Jon is being seen for exertional jaw pain which is his anginal equivalent.  He has a 3 block stretch that he walks on a regular basis.  For the last 3 weeks, he has been having jaw discomfort approximately one half blocks into his walk.  [place] within the city and there is a park bench within each of these blocks.  At about senior care around, he will have jaw discomfort and will be short of breath.  He denies nausea, vomiting, diaphoresis or dizziness.  This is similar to the pain he had previously when he had his stents placed.  Also, in the past, he had intrascapular pain but he has not had this discomfort.  He is currently maintained on aspirin 81 mg daily, Lipitor 5 mg at bedtime, metoprolol 50 mg XL daily, sublingual nitro as needed for chest pain, ramipril 2.5 mg daily, and he does take occasional sildenafil but was told not to take it within 48 hours of his planned procedure.    He went on to have a stress test on 7/30/18.  The stress echo was abnormal.  His EF at baseline was 55-60% without wall motion abnormalities.  With stress, his EF increased to 65%, he had jaw pain, he had akinesis in the LAD territory.    He has had 2 previous stents placed.  He had a 3.5 x 13 mm Taxus stent placed on 9/3/2003.  He also had a 3.0 x 24 mm Taxus stent on 6/16/2005.  Both these were placed at [facility] in [place].    His most recent lipid panel was on 7/26/18.  It showed a total cholesterol of 145, LDL of 67, and triglycerides of 132.  He remains on Lipitor 5 mg at bedtime.      IMAGING  RESULTS:   Stress echo from 7/30/18.  Interpretation Summary  Abnormal stress echocardiogram.     Normal resting images with EF 55-60%. No resting wallmotion abnormalities.     With stress there is LAD territory akinesis. EF increased to over 65% with  stress.  Patient had Jaw pain during peak stress.  Normal BP response to exercise.  No significant valvular abnormality.  Normal functional capacity.    Notes Recorded by Hola Barrera MD on 8/23/2017 at 7:21 PM Zio Patch Indication: 72-year-old male with irregular pulse Analysis Time 13 days, 21 hours starting 8/1/2017 until 8/15/2017 predominant underlying rhythm was sinus rhythm. Minimum heart   rate 54 bpm, maximum 143 BPM, average 81 BPM. 4 supraventricular tachycardia runs occurred, then with the fastest interval lasted 4 beats, maximum rate 143 BPM, the longest lasted 9 beats with average rate of 101 BPM.  No patient triggered or diary   entries noted. Isolated supraventricular ectopies were occasional measuring 1.5%.  Rare SVE couplets, no triplets. Isolated ventricular ectopies were occasional, measuring 1.9%.  Rare ventricular couplets.  No triplets.    Impression:  Predominant   underlying rhythm was Sinus rhythm. 4 supraventricular tachycardia runs occurred - no patient symptoms noted.  Hola Barrera MD    ALLERGIES:   Allergies   Allergen Reactions     Hydrocodone-Acetaminophen Nausea and Vomiting     Simvastatin Rash     Thimerosal Swelling     Diatrizoate Rash        PAST MEDICAL HISTORY:   Past Medical History:   Diagnosis Date     Dizziness and giddiness     2001,episode     Other skin changes     perianal area     Transient global amnesia     hx of        PAST SURGICAL HISTORY:   Past Surgical History:   Procedure Laterality Date     APPENDECTOMY OPEN      No Comments Provided     COLONOSCOPY      2009,Due 2019     EXTRACAPSULAR CATARACT EXTRATION WITH INTRAOCULAR LENS IMPLANT      2007,left sided, cataract extraction and intraocular lens      EXTRACAPSULAR CATARACT EXTRATION WITH INTRAOCULAR LENS IMPLANT      2007,right sided, cataract  extraction and intraocular lens     HEART CATH, ANGIOPLASTY      2003,stenting, mid left anterior descending lesion     LAPAROSCOPIC CHOLECYSTECTOMY      2007,with cholangiogram     OTHER SURGICAL HISTORY      2005,[id number],OTHER,90% LAD & 99% SECOND DIAGONAL     OTHER SURGICAL HISTORY      [id number],OTHER,patient states sensitive to ANESTHESIA     OTHER SURGICAL HISTORY      11/17/15,[id number],TRANSURETHRAL RESECTION OF BLADDER TUMOR,Dr. Mcginnis - [facility]     SIGMOIDOSCOPY FLEXIBLE      1998     TONSILLECTOMY      No Comments Provided        FAMILY HISTORY:   Family History   Problem Relation Age of Onset     HEART DISEASE Mother      Heart Disease,CAD     HEART DISEASE Father      Heart Disease,CAD     Prostate Cancer Father 80     Cancer-prostate     HEART DISEASE Brother      Heart Disease,3 of 4 have CAD     Prostate Cancer Brother 40     Cancer-prostate     Other - See Comments Brother      CABG sdc young age     Prostate Cancer Brother 65     Cancer-prostate     Other - See Comments Brother      no heart disease     Other - See Comments Brother      CABG sdc young age 40s     Colon Cancer No family hx of      Cancer-colon     Anesthesia Reaction No family hx of      Anesthesia Problem     Blood Disease No family hx of      Blood Disease,no bleeding or clotting        SOCIAL HISTORY:   Social History     Social History     Marital status:      Spouse name: N/A     Number of children: N/A     Years of education: N/A     Social History Main Topics     Smoking status: Never Smoker     Smokeless tobacco: Never Used     Alcohol use Yes      Comment: Alcoholic Drinks/day: occasionally     Drug use: None      Comment: Drug use: No     Sexual activity: Not Asked     Other Topics Concern     None     Social History Narrative    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    08/07:    Eight minutes Nick protocol stress echo normal.    Never  smoked. No coffee.  One soda daily.  Rare alcohol.    Likes to cross country ski and bike. .         CURRENT MEDICATIONS:   Prior to Admission medications    Medication Sig Start Date End Date Taking? Authorizing Provider   acetaminophen (TYLENOL) 500 MG tablet Take 500 mg by mouth every 6 hours as needed Take 1 tablet by mouth every 6 hours if needed. Max acetaminophen dose: 4000mg in 24 hrs 12/10/14   Reported, Patient   aspirin 81 MG chewable tablet Take 81 mg by mouth daily with food Take 1 tablet by mouth once daily with a meal. 11/12/15   Reported, Patient   atorvastatin (LIPITOR) 10 MG tablet Take 5 mg by mouth At Bedtime Take 0.5 tablets by mouth at bedtime. 1/2 tab daily 7/26/17   Reported, Patient   fluorouracil (EFUDEX) 5 % cream Apply 1 Application topically At Bedtime APPLY ONCE DAILY AT BEDTIME FOR 4 WEEKS. WASH OFF IN THE MORNING 7/7/16   Reported, Patient   fluticasone (FLONASE) 50 MCG/ACT spray Spray 2 sprays in nostril daily Inhale 2 Sprays into both nostrils once daily. 7/26/17   Reported, Patient   levothyroxine (SYNTHROID/LEVOTHROID) 100 MCG tablet Take 100 mcg by mouth every morning (before breakfast) Take 1 tablet by mouth before breakfast. 7/26/17   Reported, Patient   metoprolol succinate (TOPROL-XL) 50 MG 24 hr tablet Take 50 mg by mouth daily Take 1 tablet by mouth once daily. 7/26/17   Reported, Patient   naproxen sodium (ANAPROX) 220 MG tablet Take 220 mg by mouth as needed Take 220 mg by mouth one time if needed.    Reported, Patient   nitroGLYcerin (NITROSTAT) 0.4 MG sublingual tablet For chest pain place 1 tablet under the tongue every 5 minutes for 3 doses. If symptoms persist 5 minutes after 1st dose call 911. 7/24/18   Guero Turner MD   ramipril (ALTACE) 2.5 MG capsule Take 2.5 mg by mouth daily Take 1 capsule by mouth once daily. 7/26/17   Reported, Patient   sildenafil (REVATIO) 20 MG tablet Take 20-40 mg by mouth as needed Take 1-2 tablets by mouth once daily if  needed for Other (Specify) (30 min prior to sexual activity). 10/11/16   Reported, Patient   triamcinolone (KENALOG) 0.1 % ointment Apply 1 Application topically 3 times daily Apply  topically to affected area(s) 3 times daily. 8/23/12   Reported, Patient          ROS:   CONSTITUTIONAL: No weight loss, fever, chills, weakness or fatigue.   HEENT: Eyes: No visual changes. Ears, Nose, Throat: No hearing loss, congestion or difficulty swallowing.   CARDIOVASCULAR: (+) chest pain, chest pressure and chest discomfort. No palpitations or lower extremity edema.   RESPIRATORY: (+) shortness of breath with dyspnea upon exertion, but no cough or sputum production.   GASTROINTESTINAL: No abdominal pain. No anorexia, nausea, vomiting or diarrhea.   NEUROLOGICAL: No headache, lightheadedness, dizziness, syncope, ataxia or weakness.   HEMATOLOGIC: No anemia, bleeding or bruising.   PSYCHIATRIC: No history of depression or anxiety.   ENDOCRINOLOGIC: No reports of sweating, cold or heat intolerance. No polyuria or polydipsia.   SKIN: No abnormal rashes or itching.       PHYSICAL EXAM:   GENERAL: The patient is a well-developed, well-nourished, in no apparent distress. Alert and oriented x3.   HEENT: Head is normocephalic and atraumatic. Eyes are symmetrical with normal visual tracking.  HEART: Regular rate and rhythm, S1S2 present without murmur, rub or gallop.   LUNGS: Respirations regular and unlabored. Clear to auscultation.   GI: Abdomen is soft and nondistended.   EXTREMITIES: No peripheral edema present.   MUSCULOSKELETAL: No joint swelling.   NEUROLOGIC: Alert and oriented X3.    SKIN: No jaundice. No rashes or visible skin lesions present.       LAB RESULTS:   No visits with results within 2 Month(s) from this visit.  Latest known visit with results is:    Office Visit - [facility] on 07/26/2017   Component Date Value Ref Range Status     Chloride 07/26/2017 104  98 - 107 mmol/L Final     Carbon Dioxide 07/26/2017 26  21 - 31  mmol/L Final     Creatinine 07/26/2017 1.31* 0.70 - 1.30 mg/dL Final     Sodium 07/26/2017 142  133 - 143 mmol/L Final     BUN/Creatinine Ratio - Historical 07/26/2017 15   Final     Calcium 07/26/2017 10.0  8.6 - 10.3 mg/dL Final     Urea Nitrogen 07/26/2017 20  7 - 25 mg/dL Final     GFR Estimate If Black 07/26/2017 >60  >60 ml/min/1.73m2 Final     Anion Gap - Historical 07/26/2017 12  5 - 18 Final     GFR If Not  - Hist* 07/26/2017 54* >60 ml/min/1.73m2 Final     Glucose 07/26/2017 104  70 - 105 mg/dL Final     Potassium 07/26/2017 4.3  3.5 - 5.1 mmol/L Final     Cholesterol Total 07/26/2017 145  <200 mg/dL Final     Cholesterol/HDL Ratio - Historical 07/26/2017 2.79  <4.50 Final     Non-HDL Cholesterol - Historical 07/26/2017 93  <145 mg/dL Final     LDL Cholesterol Calculated 07/26/2017 67  <100 mg/dL Final     Patient Status - Historical 07/26/2017 FASTING   Final     HDL Cholesterol 07/26/2017 52  23 - 92 mg/dL Final     Triglycerides 07/26/2017 132  <150 mg/dL Final     TSH - Historical 07/26/2017 0.33* 0.34 - 5.60 uIU/mL Final     Hepatitis C Ab Category - Historic* 07/26/2017 Non-Reactive  Non-Reactive Final            ASSESSMENT:       ICD-10-CM    1. ASCVD (arteriosclerotic cardiovascular disease) I25.10 EKG 12-lead, tracing only (Future)     clopidogrel (PLAVIX) 75 MG tablet     Coronary Angiography Adult Order   2. Coronary artery disease of native artery of native heart with stable angina pectoris (H) I25.118 Coronary Angiography Adult Order   3. Hx of cardiac cath on 6/16/2005 and 9/3/2003 at [facility] with stenting to the LAD Z98.890 Coronary Angiography Adult Order   4. History of coronary artery stent placement to his LAD on 9/3/2003 and 6/16/2005 Z95.5 Coronary Angiography Adult Order   5. S/P angioplasty with stent Z95.9 Coronary Angiography Adult Order   6. Essential hypertension I10    7. Pure hypercholesterolemia E78.00          PLAN:   1.  Based on his symptoms of jaw pain  which is his angina equivalent with activity, history of stenting to his LAD with similar symptoms, and his abnormal stress test showing wall motion abnormalities with in the LAD region, it was suggested he have a cardiac catheterization.  2.  They are requesting to have this done at [facility].  Referral has been placed for an angiogram at the next available appointment.  3.  He will be started on Plavix 75 mg daily.  4.  He will continue aspirin 81 mg daily, Lipitor 5 mg at bedtime, metoprolol 50 mg in the evening,  ramipril 2.5 mg daily, and submental nitro as needed for chest pain.  5.  He does not have a contrast dye allergy and he was told not to take sildenafil within 48 hours before or after procedure.  6.  The risks were discussed.  This includes but are not limited to bleeding, infection, hematoma, bruising, dissection, additional surgeries, heart attack, and death.  7.  He will be seen after completion of the procedure.      Thank you for allowing me to participate in the care of your patient. Please do not hesitate to contact me if you have any questions.     Tor Arguello, DO

## 2018-07-31 NOTE — PATIENT INSTRUCTIONS
Angiogram at [facility], you will be called with date and time    Start Plavix 75 mg daily    Do not take Revatio 48 hours prior to procedure

## 2018-07-31 NOTE — MR AVS SNAPSHOT
After Visit Summary   7/31/2018    Sandro Yoon    MRN: [id number]           Patient Information     Date Of Birth          1945        Visit Information        Provider Department      7/31/2018 4:15 PM Tor Arguello, DO [facility]        Today's Diagnoses     ASCVD (arteriosclerotic cardiovascular disease)    -  1      Care Instructions    Angiogram at [facility], you will be called with date and time    Start Plavix 75 mg daily    Do not take Revatio 48 hours prior to procedure              Follow-ups after your visit        Future tests that were ordered for you today     Open Future Orders        Priority Expected Expires Ordered    EKG 12-lead, tracing only (Future) Routine  11/28/2018 7/31/2018            Who to contact     If you have questions or need follow up information about today's clinic visit or your schedule please contact [facility] directly at [phone].  Normal or non-critical lab and imaging results will be communicated to you by MyChart, letter or phone within 4 business days after the clinic has received the results. If you do not hear from us within 7 days, please contact the clinic through [employer] or phone. If you have a critical or abnormal lab result, we will notify you by phone as soon as possible.  Submit refill requests through [employer] or call your pharmacy and they will forward the refill request to us. Please allow 3 business days for your refill to be completed.          Additional Information About Your Visit        MyChart Information     [employer] gives you secure access to your electronic health record. If you see a primary care provider, you can also send messages to your care team and make appointments. If you have questions, please call your primary care clinic.  If you do not have a primary care provider, please call [phone] and they will assist you.        Care EveryWhere ID     This is your Care  "EveryWhere ID. This could be used by other organizations to access your [place] medical records  [id number]        Your Vitals Were     Pulse Height BMI (Body Mass Index)             72 1.74 m (5' 8.5\") 23.67 kg/m2          Blood Pressure from Last 3 Encounters:   07/31/18 114/82   07/24/18 120/64   04/09/18 116/74    Weight from Last 3 Encounters:   07/31/18 71.7 kg (158 lb)   07/30/18 72.1 kg (159 lb)   07/24/18 71.7 kg (158 lb)                 Today's Medication Changes          These changes are accurate as of 7/31/18  4:52 PM.  If you have any questions, ask your nurse or doctor.               Start taking these medicines.        Dose/Directions    clopidogrel 75 MG tablet   Commonly known as:  PLAVIX   Used for:  ASCVD (arteriosclerotic cardiovascular disease)   Started by:  Tor Arguello, DO        Dose:  75 mg   Take 1 tablet (75 mg) by mouth daily   Quantity:  30 tablet   Refills:  11            Where to get your medicines      These medications were sent to [facility] - [place], [place] - [address]  [address], [facility] [address]     Phone:  [phone]     clopidogrel 75 MG tablet                Primary Care Provider Office Phone # Fax #    Guero Jono Turner [phone]       [address] [facility] [address]        Equal Access to Services     [facility] AH: Hadii lisa ku hadasho Soomaali, waaxda luqadaha, qaybta kaalmada judiegyada, noble castanon. So [facility] [phone].    ATENCIÓN: Si habla español, tiene a robertson disposición servicios gratuitos de asistencia lingüística. Llame al [phone].    We comply with applicable federal civil rights laws and [place] laws. We do not discriminate on the basis of race, color, national origin, age, disability, sex, sexual orientation, or gender identity.            Thank you!     Thank you for choosing [facility]  for your care. Our " goal is always to provide you with excellent care. Hearing back from our patients is one way we can continue to improve our services. Please take a few minutes to complete the written survey that you may receive in the mail after your visit with us. Thank you!             Your Updated Medication List - Protect others around you: Learn how to safely use, store and throw away your medicines at www.disposemymeds.org.          This list is accurate as of 7/31/18  4:52 PM.  Always use your most recent med list.                   Brand Name Dispense Instructions for use Diagnosis    acetaminophen 500 MG tablet    TYLENOL     Take 500 mg by mouth every 6 hours as needed Take 1 tablet by mouth every 6 hours if needed. Max acetaminophen dose: 4000mg in 24 hrs        aspirin 81 MG chewable tablet      Take 81 mg by mouth daily with food Take 1 tablet by mouth once daily with a meal.        atorvastatin 10 MG tablet    LIPITOR     Take 5 mg by mouth At Bedtime Take 0.5 tablets by mouth at bedtime. 1/2 tab daily        clopidogrel 75 MG tablet    PLAVIX    30 tablet    Take 1 tablet (75 mg) by mouth daily    ASCVD (arteriosclerotic cardiovascular disease)       fluorouracil 5 % cream    EFUDEX     Apply 1 Application topically At Bedtime APPLY ONCE DAILY AT BEDTIME FOR 4 WEEKS. WASH OFF IN THE MORNING        fluticasone 50 MCG/ACT spray    FLONASE     Spray 2 sprays in nostril daily Inhale 2 Sprays into both nostrils once daily.        levothyroxine 100 MCG tablet    SYNTHROID/LEVOTHROID     Take 100 mcg by mouth every morning (before breakfast) Take 1 tablet by mouth before breakfast.        metoprolol succinate 50 MG 24 hr tablet    TOPROL-XL     Take 50 mg by mouth daily Take 1 tablet by mouth once daily.        naproxen sodium 220 MG tablet    ANAPROX     Take 220 mg by mouth as needed Take 220 mg by mouth one time if needed.        nitroGLYcerin 0.4 MG sublingual tablet    NITROSTAT    25 tablet    For chest pain place 1  tablet under the tongue every 5 minutes for 3 doses. If symptoms persist 5 minutes after 1st dose call 911.    Atypical angina (H)       ramipril 2.5 MG capsule    ALTACE     Take 2.5 mg by mouth daily Take 1 capsule by mouth once daily.        sildenafil 20 MG tablet    REVATIO     Take 20-40 mg by mouth as needed Take 1-2 tablets by mouth once daily if needed for Other (Specify) (30 min prior to sexual activity).        triamcinolone 0.1 % ointment    KENALOG     Apply 1 Application topically 3 times daily Apply  topically to affected area(s) 3 times daily.

## 2018-08-01 ASSESSMENT — PATIENT HEALTH QUESTIONNAIRE - PHQ9: SUM OF ALL RESPONSES TO PHQ QUESTIONS 1-9: 0

## 2018-08-01 ASSESSMENT — ANXIETY QUESTIONNAIRES: GAD7 TOTAL SCORE: 0

## 2018-08-02 ENCOUNTER — HOSPITAL ENCOUNTER (OUTPATIENT)
Dept: CARDIOLOGY | Facility: OTHER | Age: 73
End: 2018-08-02
Attending: INTERNAL MEDICINE
Payer: MEDICARE

## 2018-08-02 DIAGNOSIS — I25.10 ASCVD (ARTERIOSCLEROTIC CARDIOVASCULAR DISEASE): ICD-10-CM

## 2018-08-02 PROCEDURE — 93010 ELECTROCARDIOGRAM REPORT: CPT | Performed by: INTERNAL MEDICINE

## 2018-08-02 PROCEDURE — 93005 ELECTROCARDIOGRAM TRACING: CPT

## 2018-08-10 DIAGNOSIS — R94.39 ABNORMAL BALLISTOCARDIOGRAM: Primary | ICD-10-CM

## 2018-08-10 LAB
ANION GAP SERPL CALCULATED.3IONS-SCNC: 5 MMOL/L (ref 3–14)
BASOPHILS # BLD AUTO: 0.1 10E9/L (ref 0–0.2)
BASOPHILS NFR BLD AUTO: 0.9 %
BUN SERPL-MCNC: 19 MG/DL (ref 7–25)
CALCIUM SERPL-MCNC: 9.7 MG/DL (ref 8.6–10.3)
CHLORIDE SERPL-SCNC: 106 MMOL/L (ref 98–107)
CO2 SERPL-SCNC: 29 MMOL/L (ref 21–31)
CREAT SERPL-MCNC: 1.31 MG/DL (ref 0.7–1.3)
DIFFERENTIAL METHOD BLD: NORMAL
EOSINOPHIL # BLD AUTO: 0.5 10E9/L (ref 0–0.7)
EOSINOPHIL NFR BLD AUTO: 8.1 %
ERYTHROCYTE [DISTWIDTH] IN BLOOD BY AUTOMATED COUNT: 12.3 % (ref 10–15)
GFR SERPL CREATININE-BSD FRML MDRD: 53 ML/MIN/1.7M2
GLUCOSE SERPL-MCNC: 90 MG/DL (ref 70–105)
HCT VFR BLD AUTO: 47.1 % (ref 40–53)
HGB BLD-MCNC: 15.4 G/DL (ref 13.3–17.7)
IMM GRANULOCYTES # BLD: 0 10E9/L (ref 0–0.4)
IMM GRANULOCYTES NFR BLD: 0.2 %
LYMPHOCYTES # BLD AUTO: 1.9 10E9/L (ref 0.8–5.3)
LYMPHOCYTES NFR BLD AUTO: 29 %
MCH RBC QN AUTO: 32.3 PG (ref 26.5–33)
MCHC RBC AUTO-ENTMCNC: 32.7 G/DL (ref 31.5–36.5)
MCV RBC AUTO: 99 FL (ref 78–100)
MONOCYTES # BLD AUTO: 1 10E9/L (ref 0–1.3)
MONOCYTES NFR BLD AUTO: 14.8 %
NEUTROPHILS # BLD AUTO: 3 10E9/L (ref 1.6–8.3)
NEUTROPHILS NFR BLD AUTO: 47 %
PLATELET # BLD AUTO: 235 10E9/L (ref 150–450)
POTASSIUM SERPL-SCNC: 4.1 MMOL/L (ref 3.5–5.1)
RBC # BLD AUTO: 4.77 10E12/L (ref 4.4–5.9)
SODIUM SERPL-SCNC: 140 MMOL/L (ref 134–144)
WBC # BLD AUTO: 6.4 10E9/L (ref 4–11)

## 2018-08-10 PROCEDURE — 85025 COMPLETE CBC W/AUTO DIFF WBC: CPT | Performed by: INTERNAL MEDICINE

## 2018-08-10 PROCEDURE — 36415 COLL VENOUS BLD VENIPUNCTURE: CPT | Performed by: INTERNAL MEDICINE

## 2018-08-10 PROCEDURE — 80048 BASIC METABOLIC PNL TOTAL CA: CPT | Performed by: INTERNAL MEDICINE

## 2018-08-14 ENCOUNTER — MEDICAL CORRESPONDENCE (OUTPATIENT)
Dept: HEALTH INFORMATION MANAGEMENT | Facility: OTHER | Age: 73
End: 2018-08-14

## 2018-08-14 ENCOUNTER — TRANSFERRED RECORDS (OUTPATIENT)
Dept: HEALTH INFORMATION MANAGEMENT | Facility: OTHER | Age: 73
End: 2018-08-14

## 2018-08-15 ENCOUNTER — TRANSFERRED RECORDS (OUTPATIENT)
Dept: HEALTH INFORMATION MANAGEMENT | Facility: OTHER | Age: 73
End: 2018-08-15

## 2018-08-17 DIAGNOSIS — Z95.5 STATUS POST INSERTION OF DRUG-ELUTING STENT INTO LEFT ANTERIOR DESCENDING (LAD) ARTERY: Primary | ICD-10-CM

## 2018-08-20 ENCOUNTER — TELEPHONE (OUTPATIENT)
Dept: CARDIAC REHAB | Facility: OTHER | Age: 73
End: 2018-08-20

## 2018-08-20 NOTE — TELEPHONE ENCOUNTER
Called patient's wife back, who left a message wanting to schedule her  Jon, for cardiac rehab. Scheduled for Tuesday, 8/21/18 at 0900.

## 2018-08-21 ENCOUNTER — OFFICE VISIT (OUTPATIENT)
Dept: PEDIATRICS | Facility: OTHER | Age: 73
End: 2018-08-21
Attending: INTERNAL MEDICINE
Payer: COMMERCIAL

## 2018-08-21 ENCOUNTER — HOSPITAL ENCOUNTER (OUTPATIENT)
Dept: CARDIAC REHAB | Facility: OTHER | Age: 73
End: 2018-08-21
Attending: INTERNAL MEDICINE
Payer: MEDICARE

## 2018-08-21 VITALS
BODY MASS INDEX: 23.34 KG/M2 | OXYGEN SATURATION: 99 % | HEIGHT: 68 IN | HEART RATE: 72 BPM | DIASTOLIC BLOOD PRESSURE: 66 MMHG | WEIGHT: 154 LBS | SYSTOLIC BLOOD PRESSURE: 102 MMHG

## 2018-08-21 VITALS — WEIGHT: 158.6 LBS | HEIGHT: 68 IN | BODY MASS INDEX: 24.04 KG/M2

## 2018-08-21 DIAGNOSIS — Z95.820 S/P ANGIOPLASTY WITH STENT: Primary | ICD-10-CM

## 2018-08-21 DIAGNOSIS — M75.101 ROTATOR CUFF SYNDROME, RIGHT: ICD-10-CM

## 2018-08-21 DIAGNOSIS — S60.219A BRUISING OF WRIST: ICD-10-CM

## 2018-08-21 PROCEDURE — 40000116 ZZH STATISTIC OP CR VISIT

## 2018-08-21 PROCEDURE — 99213 OFFICE O/P EST LOW 20 MIN: CPT | Performed by: INTERNAL MEDICINE

## 2018-08-21 PROCEDURE — G0463 HOSPITAL OUTPT CLINIC VISIT: HCPCS

## 2018-08-21 PROCEDURE — 93798 PHYS/QHP OP CAR RHAB W/ECG: CPT

## 2018-08-21 RX ORDER — TICAGRELOR 90 MG/1
90 TABLET ORAL 2 TIMES DAILY
Refills: 2 | COMMUNITY
Start: 2018-08-15

## 2018-08-21 RX ORDER — CLOPIDOGREL BISULFATE 75 MG/1
75 TABLET ORAL DAILY
Refills: 11 | COMMUNITY
Start: 2018-07-31 | End: 2018-08-21

## 2018-08-21 ASSESSMENT — PAIN SCALES - GENERAL: PAINLEVEL: NO PAIN (0)

## 2018-08-21 NOTE — NURSING NOTE
Had 2 stents placed 8/14/18 at [facility].  Check incision site groin area.

## 2018-08-21 NOTE — PROGRESS NOTES
Subjective  Sandro Yoon is a 73 year old male who presents for multiple concerns.  He recently underwent coronary angiography at [facility] in [place].  They first accessed the artery in his wrists.  They were unsuccessful and had to move to the right groin site.  He has significant bruising in the wrist.  He has been wearing a brace to see if it can help reduce the discomfort when he bumps it.  He also has some bulging in the groin area he like me to check on.  It happens to coincide with the same side he has a chronic hernia.  He wonders if those are hurting each other.  At the time of his angioplasty he had 2 stents placed.  He has been taking Brilinta as prescribed.  History of hyperlipidemia which has been stable on Lipitor 40 mg daily.  He continues on aspirin for prophylaxis.  His right shoulder has been bothering him.  He thinks it was maybe wrenched on during the procedure.    Problem List/PMH: reviewed in EMR, and made relevant updates today.  Medications: reviewed in EMR, and made relevant updates today.  Allergies: reviewed in EMR, and made relevant updates today.    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    08/07:    Eight minutes Nick protocol stress echo normal.    Never smoked. No coffee.  One soda daily.  Rare alcohol.    Likes to cross country ski and bike. .     I reviewed social history and made relevant updates today.    Family Hx:   Family History   Problem Relation Age of Onset     HEART DISEASE Mother      Heart Disease,CAD     HEART DISEASE Father      Heart Disease,CAD     Prostate Cancer Father 80     Cancer-prostate     HEART DISEASE Brother      Heart Disease,3 of 4 have CAD     Prostate Cancer Brother 40     Cancer-prostate     Other - See  "Comments Brother      CABG sdc young age     Prostate Cancer Brother 65     Cancer-prostate     Other - See Comments Brother      no heart disease     Other - See Comments Brother      CABG sdc young age 40s     Colon Cancer No family hx of      Cancer-colon     Anesthesia Reaction No family hx of      Anesthesia Problem     Blood Disease No family hx of      Blood Disease,no bleeding or clotting       Objective  Vitals: reviewed in EMR.  /66  Pulse 72  Ht 5' 7.5\" (1.715 m)  Wt 154 lb (69.9 kg)  SpO2 99%  BMI 23.76 kg/m2    Gen: Pleasant male, NAD.  HEENT: MMM  Neck: Supple  Cardiovascular: No bruit right groin.  : Mild tenderness and swelling of right groin area.  With easily reducible hernia.  Pulm: Breathing easily  Neuro: Grossly intact  Skin: Bruising of right anterior wrist, right inguinal area.  Psychiatric: Normal affect and insight. Does not appear anxious or depressed.  Muscular skeletal: Positive can test right side.  Negative Neer's and Shook right side.  Mild tenderness to palpation superior posterior right shoulder.    Labs:  Lab Results   Component Value Date    WBC 6.4 08/10/2018    HGB 15.4 08/10/2018    HCT 47.1 08/10/2018     08/10/2018    TRIG 132 07/26/2017    HDL 52 07/26/2017     08/10/2018    BUN 19 08/10/2018    CO2 29 08/10/2018         Assessment    ICD-10-CM    1. S/P angioplasty with stent Z95.9    2. Bruising of wrist S60.219A    3. Rotator cuff syndrome, right M75.101 PHYSICAL THERAPY REFERRAL     Orders Placed This Encounter   Procedures     PHYSICAL THERAPY REFERRAL       Plan   -- Expected clinical course discussed   -- Medications and their side effects discussed   --Continue Brilinta, no missed doses   --Continue cardiac rehab   --Reassurance regarding bruising   --Physical therapy referral for right rotator cuff syndrome   --Follow-up with Dr. Arguello next week as planned.    Signed, Guero Turner MD  Internal Medicine & Pediatrics    "

## 2018-08-21 NOTE — MR AVS SNAPSHOT
"              After Visit Summary   8/21/2018    Sandro Yoon    MRN: [id number]           Patient Information     Date Of Birth          1945        Visit Information        Provider Department      8/21/2018 10:45 AM Guero Turner MD [facility]        Today's Diagnoses     S/P angioplasty with stent    -  1    Bruising of wrist        Rotator cuff syndrome, right           Follow-ups after your visit        Additional Services     PHYSICAL THERAPY REFERRAL       *This therapy referral will be filtered to a centralized scheduling office at [facility] and the patient will receive a call to schedule an appointment at a [place] location most convenient for them. *     [facility] Therapy with Rosa Hodge    If you have not heard from the scheduling office within 2 business days, please call [phone] for all locations, with the exception of [place], please call [phone] and [facility], please call [phone]  Treatment: Evaluation & Treatment  Special Instructions/Modalities:   Special Programs:     Please be aware that coverage of these services is subject to the terms and limitations of your health insurance plan.  Call member services at your health plan with any benefit or coverage questions.      **Note to Provider:  If you are referring outside of [place] for the therapy appointment, please list the name of the location in the \"special instructions\" above, print the referral and give to the patient to schedule the appointment.                  Your next 10 appointments already scheduled     Aug 30, 2018 10:45 AM CDT   Return Visit with Tor Arguello DO   [facility] ([facility])    [address]  [facility] [id number] [phone]              Who to contact     If you have questions or need follow up information about today's clinic visit or your schedule " "please contact [facility] directly at [phone].  Normal or non-critical lab and imaging results will be communicated to you by MyChart, letter or phone within 4 business days after the clinic has received the results. If you do not hear from us within 7 days, please contact the clinic through [employer] or phone. If you have a critical or abnormal lab result, we will notify you by phone as soon as possible.  Submit refill requests through [employer] or call your pharmacy and they will forward the refill request to us. Please allow 3 business days for your refill to be completed.          Additional Information About Your Visit        [employer] Information     [employer] gives you secure access to your electronic health record. If you see a primary care provider, you can also send messages to your care team and make appointments. If you have questions, please call your primary care clinic.  If you do not have a primary care provider, please call [phone] and they will assist you.        Care EveryWhere ID     This is your Care EveryWhere ID. This could be used by other organizations to access your [facility] medical records  [id number]        Your Vitals Were     Pulse Height Pulse Oximetry BMI (Body Mass Index)          72 5' 7.5\" (1.715 m) 99% 23.76 kg/m2         Blood Pressure from Last 3 Encounters:   08/21/18 102/66   07/31/18 114/82   07/24/18 120/64    Weight from Last 3 Encounters:   08/21/18 154 lb (69.9 kg)   07/31/18 158 lb (71.7 kg)   07/30/18 159 lb (72.1 kg)              We Performed the Following     PHYSICAL THERAPY REFERRAL          Today's Medication Changes          These changes are accurate as of 8/21/18 11:06 AM.  If you have any questions, ask your nurse or doctor.               Stop taking these medicines if you haven't already. Please contact your care team if you have questions.     clopidogrel 75 MG tablet   Commonly known as:  PLAVIX   Stopped by:  Guero Turner, " MD                    Primary Care Provider Office Phone # Fax #    Guero Jono Turner [phone] [address]   [place] [address]        Equal Access to Services     MAYURI GRAY : Trish lisa dobbs abdiel Miller, wanada luqadaha, qaybta kaalmada roberto, noble starr lagincatherine lg. So [facility] [phone].    ATENCIÓN: Si habla español, tiene a robertson disposición servicios gratuitos de asistencia lingüística. Llame al [phone].    We comply with applicable federal civil rights laws and [place] laws. We do not discriminate on the basis of race, color, national origin, age, disability, sex, sexual orientation, or gender identity.            Thank you!     Thank you for choosing [facility] AND [facility]  for your care. Our goal is always to provide you with excellent care. Hearing back from our patients is one way we can continue to improve our services. Please take a few minutes to complete the written survey that you may receive in the mail after your visit with us. Thank you!             Your Updated Medication List - Protect others around you: Learn how to safely use, store and throw away your medicines at www.disposemymeds.org.          This list is accurate as of 8/21/18 11:06 AM.  Always use your most recent med list.                   Brand Name Dispense Instructions for use Diagnosis    acetaminophen 500 MG tablet    TYLENOL     Take 500 mg by mouth every 6 hours as needed Take 1 tablet by mouth every 6 hours if needed. Max acetaminophen dose: 4000mg in 24 hrs        aspirin 81 MG chewable tablet      Take 81 mg by mouth daily with food Take 1 tablet by mouth once daily with a meal.        BRILINTA 90 MG tablet   Generic drug:  ticagrelor      Take 90 mg by mouth 2 times daily        fluorouracil 5 % cream    EFUDEX     Apply 1 Application topically At Bedtime APPLY ONCE DAILY AT BEDTIME FOR 4 WEEKS. WASH OFF IN THE MORNING        fluticasone 50  MCG/ACT spray    FLONASE     Spray 2 sprays in nostril daily Inhale 2 Sprays into both nostrils once daily.        levothyroxine 100 MCG tablet    SYNTHROID/LEVOTHROID     Take 100 mcg by mouth every morning (before breakfast) Take 1 tablet by mouth before breakfast.        LIPITOR PO      Take 40 mg by mouth        metoprolol succinate 50 MG 24 hr tablet    TOPROL-XL     Take 50 mg by mouth daily Take 1 tablet by mouth once daily.        nitroGLYcerin 0.4 MG sublingual tablet    NITROSTAT    25 tablet    For chest pain place 1 tablet under the tongue every 5 minutes for 3 doses. If symptoms persist 5 minutes after 1st dose call 911.    Atypical angina (H)       ramipril 2.5 MG capsule    ALTACE     Take 2.5 mg by mouth daily Take 1 capsule by mouth once daily.        triamcinolone 0.1 % ointment    KENALOG     Apply 1 Application topically 3 times daily Apply  topically to affected area(s) 3 times daily.

## 2018-08-21 NOTE — PROVIDER NOTIFICATION
08/21/18 0958   Session   Session Initial Evaluation and Exercise Prescription   Certified through this date 09/19/18   Cardiac Rehab Assessment   Cardiac Rehab Assessment 8/21/18 Initial visit to cardiac rehab after 2 TAMMY placed in his LAD, due to restenosis. He was changed from Plavix to Brillinta. He had a bleed in his eye, he says is a side effect of Brillinta, and thinks he is getting a rash as well. His right arm is sore, 4/10 from angiogran, and remains bruised. His right groin is hard, and the site became sore with walking on the TM, He got in to see Dr. Turner, to have sites checked, and f/u after procedure, following this visit. He is walking around his block 3 times per day. Follows No Added Salt diet. He is to work on getting his HDL elevated, so discussed high omega foods. Wife is interested in the Nurtrition classes, as review. Goal #1 Exercise 45 minutes 3 days per week, in first month. Goal #2 Increase Omega 3 foods in his diet by one month.    General Information   Treatment Diagnosis Stent   Date of Treatment Diagnosis 08/14/18   Significant Past CV History Previous MI;Previous PCI   Comorbidities Previous MI   Lead up symptoms jaw pain   Hospital Location [facility]   Hospital Discharge Date 08/15/18   Signs and Symptoms Post Hospital Discharge Other (see comments)  (sore right arm and groin, angiogram sites)   Outpatient Cardiac Rehab Start Date 08/21/18   Primary Physician Johnny   Primary Physician Follow Up Scheduled   Surgeon Dr. Kevyn Tripathi   Surgeon Follow Up Scheduled   Cardiologist Jos   Cardiologist Follow Up Scheduled   Ejection Fraction 55 per SE   Risk Stratification Low   Summary of Cath Report   Summary of Cath Report Available   Date Performed 08/14/18   LAD restenosis stent, restented x2   RCA 80%   Living and Work Status    Living Arrangements and Social Status house;spouse   Support System Live with an adult   Return to Employment Retired   Occupation Pharmacist   Fall Risk  "Screen   Fall screen completed by Cardiac Rehab   Have you fallen 2 or more times in the past year? No   Have you fallen and had an injury in the past year? No   Is patient a fall risk? No   Pain   Patient Currently in Pain Yes   Pain Location right arm/shoulder   Pain Rating 4/10   Pain Description Ache   Pain Description Comment sore since angio   Additional Pain Locations? Pain location 2   Pain Location 2 right groin   Pain Rating 2 4/10   Pain Comments sore angio site, hard   Physical Assessments   Incisions WNL   Edema None   Right Lung Sounds normal   Left Lung Sounds normal   Limitations No limitations   Individualized Treatment Plan   Monitored Sessions Scheduled 12   Monitored Sessions Attended 1   Nutrition Management - Weight Management   Weight 71.9 kg (158 lb 9.6 oz)   Height 1.727 m (5' 8\")   BMI (Calculated) 24.17   Initial Rate Your Plate Score. Dietary tool to assess eating patterns. Scores range from 24 to 72. The higher the score the healthier the eating pattern. (given)   Nutrition Management - Lipids   Lipids Labs Available   Date 07/26/17   Total Cholesterol 145   Triglycerides 132   HDL 67   LDL 52   Prescribed Lipid Medication Yes   Statin Intensity Moderate Intensity   Nutrition Management Summary   Dietary Recommendations Low Sodium;Low Fat   Stages of Change for Diet Compliance Preparation   Interventions Planned Attend Nutrition Education Class(es);Educate on Weight Management Principles;Educate on Benefits of Exercise   Interventions In Progress or Completed Understands Weight Management Principles;Educated on Benefits of Exercise   Patient Goals Goal #1;Goal #2   Goal #1 Description Increase exercise   Goal #1 Target Date 09/19/18   Goal #2 Description Increase Tyler-3   Goal #2 Target Date 09/19/18   Psychosocial Management   Psychosocial Assessment Initial   Is there history of clinical depression or increased risk of depression? No previous history   Current Level of Stress per " Patient Report Mild   Current Coping Skills Has Positive Support System   Initial Patient Health Questionnaire -9 Score (PHQ-9) for depression. 5-9 Minimal symptoms, 10-14 Minor depression, 15-19 Major depression, moderately severe, > 20 Major depression, severe  0   Initial [facility] Survey score.  Quality of Life:   If total score > 25 review individual areas where patient rated a 4 or 5.  Consider patients current medical condition and what role that plays on the score.   Adjust treatment protocol to improve areas of concern.  Consider the following:  PHQ9 score, DASI, and re-assessment within the next 30 days to assist with developing treatments.  23   Patient Goal No   Other Core Components - Hypertension   History of or Diagnosis of Hypertension Yes   Currently taking Anti-Hypertensives Yes;Beta blocker;Ace Inhibitor   Hypertension Comments Controlled   Other Core Components Summary   Interventions Planned Educate on importance of maintaining low sodium diet   Activity/Exercise History   Activity/Exercise Assessment Initial   Activity/Exercise Status prior to event? Was Physically Active   Number of Days Currently participating in Moderate Physical Activity? 6   Number of Days Currently performing  Aerobic Exercise (including rehab)? 0   Number of Minutes per Session Currently of Aerobic Exercise (average)? 0   Current Stage of Change (Physical Activity) Preparation   Current Stage of Change (Aerobic Exercise) Preparation   Patient Goals Goal #1   Goal #1 Description Exercise 45 minutes 3 times per week, by one month   Goal #1 Target Date 09/19/18   Exercise Assessment   Resting HR 78 bpm   Exercise HR 97 bpm   Post Exercise HR 80 bpm   Resting /70   Exercise /64   Post Exercise /62   Pre SpO2 97   While Exercising SpO2 96   Post SpO2 96   Effort Rating 4   Current MET Level 2.1   MET Level Goal 5   ECG Rhythm Normal sinus rhythm   Ectopy PVCs   Current Symptoms Denies symptoms    Exercise Prescription   Mode Treadmill   Duration/Time 30-45 min   Frequency 3 daysweek   OMNI Effort Rating (0-10 Scale) 4-6/10   Progression Progress peak intensity by 1/2 MET per week   Recommended Home Exercise   Type of Exercise Walking   Frequency (days per week) 5   Duration (minutes per session) 30-45 min   Effort Rating Recommended 4-6/10   Current Home Exercise   Type of Exercise Walking   Frequency (days per week) 6   Duration (minutes per session) 45   Follow-up/On-going Support   Provider follow-up needed on the following Heart Rate   Comments HR was fast at rest, watch   Learning Assessment   Learner Patient;Spouse/Significant Other   Primary Language English   Preferred Learning Style Listening;Reading   Barriers to Learning No barriers noted   Patient Education   Education recommended Nutrition;Exercise Principles   The patient's history and clinical status including hemodynamics and ECG were evaluated. The patient was assessed to be stable and appropriate to begin exercise.   The patient's functional capacity and exercise prescription were determined by walking on the TM. The patient was oriented to the program.  Risk factor profile was completed. Goals and objectives were discussed. CV response was WNL. No symptoms, complaints or pain were reported. Good prognosis for reaching goals below. Skilled therapy is necessary in order to monitor CV response to exercise, to provide education on risk factors and behavior change counseling needed to achieve patient's goals.  Plan to progress to 30-40 minutes of exercise prior to discharge from cardiac rehab.  Initial THR of 20-30 beats above RHR; Effort rating of 4-6. Initiate muscle conditioning as appropriate. Provide risk factor education and behavior change counseling.     Physician cosignature/electronic signature indicates approval of this ITP document. I have established, reviewed and made necessary changes to the individualized treatment plan and  exercise prescription for this patient.

## 2018-08-22 ENCOUNTER — HOSPITAL ENCOUNTER (OUTPATIENT)
Dept: PHYSICAL THERAPY | Facility: OTHER | Age: 73
Setting detail: THERAPIES SERIES
End: 2018-08-22
Attending: INTERNAL MEDICINE
Payer: MEDICARE

## 2018-08-22 PROCEDURE — 97161 PT EVAL LOW COMPLEX 20 MIN: CPT | Mod: GP

## 2018-08-22 PROCEDURE — G8981 BODY POS CURRENT STATUS: HCPCS | Mod: GP,CK

## 2018-08-22 PROCEDURE — 97110 THERAPEUTIC EXERCISES: CPT | Mod: GP

## 2018-08-22 PROCEDURE — 97140 MANUAL THERAPY 1/> REGIONS: CPT | Mod: GP

## 2018-08-22 PROCEDURE — 40000185 ZZHC STATISTIC PT OUTPT VISIT

## 2018-08-22 PROCEDURE — G8982 BODY POS GOAL STATUS: HCPCS | Mod: GP,CI

## 2018-08-22 NOTE — PROGRESS NOTES
08/22/18 0900   General Information   Type of Visit Initial OP Ortho PT Evaluation   Start of Care Date 08/22/18   Referring Physician Geuro Turner MD   Orders Evaluate and Treat   Date of Order 08/21/18   Insurance Type Medicare   Medical Diagnosis rotator cuff syndrome right   Surgical/Medical history reviewed Yes   Presentation and Etiology   Pertinent history of current problem (include personal factors and/or comorbidities that impact the POC) underwent angiogram and stent placement on 8/14/18. Started with wrist, had to move to groin to place stents x2. Now has right arm pain, shoulder pain, upper trap tightness. No HA. LImited with t shirts on/off. Hard to find comfortable position to sleep at night. Some radiating pain on underside of elbow to 4th digit.   Impairments A. Pain;D. Decreased ROM;E. Decreased flexibility;F. Decreased strength and endurance   Functional Limitations perform activities of daily living;perform desired leisure / sports activities   Symptom Location right shoulder, arm, upper trap   Onset date of current episode/exacerbation 08/14/18   Chronicity New   Pain rating (0-10 point scale) Best (/10);Worst (/10)   Best (/10) 3   Worst (/10) 8   Pain quality C. Aching;D. Burning   Frequency of pain/symptoms D. Other   Pain frequency comment positional   Pain/symptoms exacerbated by G. Certain positions;H. Overhead reach   Pain/symptoms eased by E. Changing positions;G. Heat;H. Cold;J. Braces/supports  (using wrist brace for support)   Prior Level of Function   Prior Level of Function-Mobility cycling, walking, cross country skiing   Prior Level of Function-ADLs no limitations   Current Level of Function   Patient role/employment history F. Retired   Cervical Spine   Observation rigid with movements   Integumentary  bruising with right wrist, edema   Shoulder Objective Findings   Side (if bilateral, select both right and left) Right   Observation hyper activity of right upper trap. Able to  use right low trap correctly. Scap sits elevated   Posture flexed   Shoulder ROM Comment full PROM present   Palpation limited loading through right head and shoulder. General listening to right chest. local listening to right bronchus.    Accessory Motion/Joint Mobility Limited depression of right coracoid process. Noted FRS right T1. ULLT + ulnar nerve   Right Shoulder Flexion AROM 150, left 145. Stretching at end range   Planned Therapy Interventions   Planned Therapy Interventions manual therapy;neuromuscular re-education;strengthening;stretching   Planned Modality Interventions   Planned Modality Interventions Cryotherapy;Hot packs   Clinical Impression   Criteria for Skilled Therapeutic Interventions Met yes, treatment indicated   PT Diagnosis right shoulder and arm pain, myofascial tightness and pain.    Influenced by the following impairments pain   Functional limitations due to impairments overhead reaching donning and doffing t shirts, limited wieght bearing tolerance through right arm for yoga and biking, limited sleep   Clinical Presentation Stable/Uncomplicated   Clinical Presentation Rationale one body area   Clinical Decision Making (Complexity) Low complexity   Therapy Frequency other (see comments)  (1-2 times per wee)   Predicted Duration of Therapy Intervention (days/wks) 12 weeks   Risk & Benefits of therapy have been explained Yes   Patient, Family & other staff in agreement with plan of care Yes   Clinical Impression Comments Onset of right arm and shoulder discomfort following angiogram and angioplasty.    ORTHO GOALS   PT Ortho Eval Goals 1;2;3   Ortho Goal 1   Goal Identifier sleep   Goal Description Patient to return to sleeping without right arm discomfort.   Target Date 11/14/18   Ortho Goal 2   Goal Identifier weight bearing   Goal Description Patient to return to weight bearing through right upper extrmeity for yoga and cycling.   Target Date 11/14/18   Ortho Goal 3   Goal Identifier  ADL   Goal Description Patient to mary ann/doff shirts without limitations or pain.    Target Date 11/14/18   Total Evaluation Time   Total Evaluation Time 20   Therapy Certification   Certification date from 08/22/18   Certification date to 11/14/18   Medical Diagnosis rotator cuff syndrome right

## 2018-08-22 NOTE — PROGRESS NOTES
[facility]          OUTPATIENT PHYSICAL THERAPY ORTHOPEDIC EVALUATION  PLAN OF TREATMENT FOR OUTPATIENT REHABILITATION  (COMPLETE FOR INITIAL CLAIMS ONLY)  Patient's Last Name, First Name, M.I.  YOB: 1945  CarSandro MCGHEE    Provider s Name:  [facility]   Medical Record No.  [id number]   Start of Care Date:  08/22/18   Onset Date:  08/14/18   Type:     _X__PT   ___OT   ___SLP Medical Diagnosis:  rotator cuff syndrome right     PT Diagnosis:  right shoulder and arm pain, myofascial tightness and pain.    Visits from SOC:  1      _________________________________________________________________________________  Plan of Treatment/Functional Goals:  manual therapy, neuromuscular re-education, strengthening, stretching     Cryotherapy, Hot packs     Goals  Goal Identifier: sleep  Goal Description: Patient to return to sleeping without right arm discomfort.  Target Date: 11/14/18    Goal Identifier: weight bearing  Goal Description: Patient to return to weight bearing through right upper extrmeity for yoga and cycling.  Target Date: 11/14/18    Goal Identifier: ADL  Goal Description: Patient to mary ann/doff shirts without limitations or pain.   Target Date: 11/14/18                                                           Therapy Frequency:  other (see comments) (1-2 times per wee)  Predicted Duration of Therapy Intervention:  12 weeks    Sweetie Hodge, PT                 I CERTIFY THE NEED FOR THESE SERVICES FURNISHED UNDER        THIS PLAN OF TREATMENT AND WHILE UNDER MY CARE     (Physician co-signature of this document indicates review and certification of the therapy plan).                       Certification Date From:  08/22/18   Certification Date To:  11/14/18    Referring Provider:  Guero Turner MD    Initial Assessment        See Epic Evaluation Start of Care Date: 08/22/18

## 2018-08-23 ENCOUNTER — TELEPHONE (OUTPATIENT)
Dept: PEDIATRICS | Facility: OTHER | Age: 73
End: 2018-08-23

## 2018-08-23 DIAGNOSIS — I25.10 ASCVD (ARTERIOSCLEROTIC CARDIOVASCULAR DISEASE): ICD-10-CM

## 2018-08-23 DIAGNOSIS — E03.9 ACQUIRED HYPOTHYROIDISM: Primary | ICD-10-CM

## 2018-08-23 RX ORDER — LEVOTHYROXINE SODIUM 100 UG/1
100 TABLET ORAL
Qty: 90 TABLET | Refills: 3 | Status: SHIPPED | OUTPATIENT
Start: 2018-08-23 | End: 2019-04-16 | Stop reason: DRUGHIGH

## 2018-08-23 RX ORDER — METOPROLOL SUCCINATE 50 MG/1
50 TABLET, EXTENDED RELEASE ORAL DAILY
Qty: 90 TABLET | Refills: 3 | Status: SHIPPED | OUTPATIENT
Start: 2018-08-23 | End: 2019-05-30

## 2018-08-23 NOTE — TELEPHONE ENCOUNTER
Patient would like to speak with nurse regarding Rx refills: Levothyroxin and Metoprolol. Please call them in to [facility], next to [address] in [place].

## 2018-08-23 NOTE — TELEPHONE ENCOUNTER
Called and informed patient that medications were sent into  Pharmacy.   Johana Yeung CMA..............8/23/2018........2:11 PM

## 2018-08-23 NOTE — TELEPHONE ENCOUNTER
Patient last seen for medication check on 8/21/18.    Last reported by patient on 7/26/17.    Johana Yeung CMA..............8/23/2018........10:50 AM

## 2018-08-27 ENCOUNTER — HOSPITAL ENCOUNTER (OUTPATIENT)
Dept: CARDIAC REHAB | Facility: OTHER | Age: 73
End: 2018-08-27
Attending: INTERNAL MEDICINE
Payer: MEDICARE

## 2018-08-29 ENCOUNTER — HOSPITAL ENCOUNTER (OUTPATIENT)
Dept: CARDIAC REHAB | Facility: OTHER | Age: 73
End: 2018-08-29
Attending: INTERNAL MEDICINE
Payer: MEDICARE

## 2018-08-29 PROCEDURE — 93798 PHYS/QHP OP CAR RHAB W/ECG: CPT

## 2018-08-29 PROCEDURE — 40000116 ZZH STATISTIC OP CR VISIT

## 2018-08-30 ENCOUNTER — OFFICE VISIT (OUTPATIENT)
Dept: CARDIOLOGY | Facility: OTHER | Age: 73
End: 2018-08-30
Attending: INTERNAL MEDICINE
Payer: COMMERCIAL

## 2018-08-30 VITALS
DIASTOLIC BLOOD PRESSURE: 84 MMHG | WEIGHT: 161 LBS | BODY MASS INDEX: 24.84 KG/M2 | HEART RATE: 80 BPM | SYSTOLIC BLOOD PRESSURE: 120 MMHG

## 2018-08-30 DIAGNOSIS — I25.118 CORONARY ARTERY DISEASE OF NATIVE ARTERY OF NATIVE HEART WITH STABLE ANGINA PECTORIS (H): ICD-10-CM

## 2018-08-30 DIAGNOSIS — I77.0 ARTERIOVENOUS FISTULA, ACQUIRED (H): ICD-10-CM

## 2018-08-30 DIAGNOSIS — E03.9 ACQUIRED HYPOTHYROIDISM: ICD-10-CM

## 2018-08-30 DIAGNOSIS — E78.00 PURE HYPERCHOLESTEROLEMIA: ICD-10-CM

## 2018-08-30 DIAGNOSIS — Z98.890 H/O CARDIAC CATHETERIZATION: Primary | ICD-10-CM

## 2018-08-30 DIAGNOSIS — I10 ESSENTIAL HYPERTENSION: ICD-10-CM

## 2018-08-30 DIAGNOSIS — Z98.890 HX OF CARDIAC CATH: ICD-10-CM

## 2018-08-30 DIAGNOSIS — Z95.820 S/P ANGIOPLASTY WITH STENT: ICD-10-CM

## 2018-08-30 DIAGNOSIS — Z95.5 HISTORY OF CORONARY ARTERY STENT PLACEMENT: ICD-10-CM

## 2018-08-30 PROCEDURE — 99215 OFFICE O/P EST HI 40 MIN: CPT | Performed by: INTERNAL MEDICINE

## 2018-08-30 PROCEDURE — G0463 HOSPITAL OUTPT CLINIC VISIT: HCPCS

## 2018-08-30 ASSESSMENT — PAIN SCALES - GENERAL: PAINLEVEL: NO PAIN (0)

## 2018-08-30 NOTE — NURSING NOTE
"Patient comes in for follow up after stent placement at [facility] on 8/14/18.  Kenya Lopez LPN ....................8/30/2018   11:12 AM  Chief Complaint   Patient presents with     Follow Up For     follow up after stent at [facility]        Initial /84 (BP Location: Right arm, Patient Position: Sitting, Cuff Size: Adult Regular)  Pulse 80  Wt 73 kg (161 lb)  BMI 24.84 kg/m2 Estimated body mass index is 24.84 kg/(m^2) as calculated from the following:    Height as of 8/21/18: 1.715 m (5' 7.5\").    Weight as of this encounter: 73 kg (161 lb).  Medication Reconciliation: complete    Kenya Lopez LPN    "

## 2018-08-30 NOTE — PATIENT INSTRUCTIONS
You were seen by  Tor Arguello DO      1.  Ultrasound of right groin, they will call you to schedule this       You will follow up with  Tor Arguello DO  in 6 months.       Please call the cardiology office with problems, questions, or concerns at [phone].    If you experience chest pain, chest pressure, chest tightness, shortness of breath, fainting, lightheadedness, nausea, vomiting, or other concerning symptoms, please report to the Emergency Department or call 911. These symptoms may be emergent, and best treated in the Emergency Department.     CARLOS HusainN, RN  [facility] Card[facility]  [phone]

## 2018-08-30 NOTE — MR AVS SNAPSHOT
After Visit Summary   8/30/2018    Sandro Yoon    MRN: [id number]           Patient Information     Date Of Birth          1945        Visit Information        Provider Department      8/30/2018 10:45 AM Tor Arguello DO [facility]        Care Instructions    You were seen by  Tor Arguello DO      1.  Ultrasound of right groin, they will call you to schedule this       You will follow up with  Tor Arguello DO  in 6 months.       Please call the cardiology office with problems, questions, or concerns at [phone].    If you experience chest pain, chest pressure, chest tightness, shortness of breath, fainting, lightheadedness, nausea, vomiting, or other concerning symptoms, please report to the Emergency Department or call 911. These symptoms may be emergent, and best treated in the Emergency Department.     CARLOS HusainN, RN  [facility]  [phone]             Follow-ups after your visit        Your next 10 appointments already scheduled     Aug 31, 2018 10:00 AM CDT   [facility] 60 with GH CR [facility]    [address]  [place] [address]   [phone]            Sep 04, 2018 10:30 AM CDT   Treatment with Sweetie Hodge PT   [facility] ([place] [facility])    [address] [facility] [address]   [phone]            Sep 05, 2018 10:00 AM CDT   Treatment 60 with GH CR [facility]    [address]  [place] [address]   [phone]            Sep 07, 2018 10:00 AM CDT   Treatment 60 with GH CR [facility]ERC[facility] LAB   [facility]    [address]  [place] [address]   [phone]            Sep 10, 2018 10:00 AM CDT   Treatment 60 with GH CR EXERCISE LAB    [facility] ([facility])    [address]  [place] [address]   [phone]            Sep 11, 2018 10:15 AM CDT   Treatment with Sweetie L Orstad, PT   [facility] ([place] [facility])    [address] [facility] [place] [address]   [phone]            Sep 12, 2018 10:00 AM CDT   Treatment 60 with GH CR EXERCISE LAB   [facility] ([facility])    [address]  [place] [address]   [phone]            Sep 13, 2018 10:15 AM CDT   Treatment with Sweetie L Orstad, PT   [facility] ([place] [facility])    [address] [facility] [place] [address]   [phone]            Sep 14, 2018 10:00 AM CDT   Treatment 60 with GH CR EXERCISE LAB   [facility] ([facility])    [address]  [place] [address]   [phone]            Sep 17, 2018 10:00 AM CDT   Treatment 60 with GH CR EXERCISE LAB   [facility] ([facility])    [address]  [place] [address]   [phone]              Who to contact     If you have questions or need follow up information about today's clinic visit or your schedule please contact [facility] directly at [phone].  Normal or non-critical lab and imaging results will be communicated to you by [employer], letter or phone within 4 business days after the clinic has received the results. If you do not hear from us within 7 days, please contact the clinic through [employer] or phone. If you have a critical or abnormal lab result, we will notify you by phone as soon as possible.  Submit refill requests through [employer] or call your pharmacy and they will forward the refill request to us. Please allow 3 business days for your refill to be completed.          Additional  Information About Your Visit        [employer] Information     Accountable gives you secure access to your electronic health record. If you see a primary care provider, you can also send messages to your care team and make appointments. If you have questions, please call your primary care clinic.  If you do not have a primary care provider, please call [phone] and they will assist you.        Care EveryWhere ID     This is your Care EveryWhere ID. This could be used by other organizations to access your [facility] medical records  [id number]        Your Vitals Were     Pulse BMI (Body Mass Index)                80 24.84 kg/m2           Blood Pressure from Last 3 Encounters:   08/30/18 120/84   08/21/18 102/66   07/31/18 114/82    Weight from Last 3 Encounters:   08/30/18 73 kg (161 lb)   08/21/18 69.9 kg (154 lb)   08/21/18 71.9 kg (158 lb 9.6 oz)              Today, you had the following     No orders found for display       Primary Care Provider Office Phone # Fax #    Guero Jono Turner [phone]       [address] [facility] [address]        Equal Access to Services     [facility]: Hadii aad ku samiao Somikey, waaxda luqadaha, qaybta kaalmada roberto, noble parada . So [facility] [phone].    ATENCIÓN: Si habla español, tiene a robertson disposición servicios gratlpyos de asistencia lingüística. Ll[facility] [phone].    We comply with applicable federal civil rights laws and [place] laws. We do not discriminate on the basis of race, color, national origin, age, disability, sex, sexual orientation, or gender identity.            Thank you!     Thank you for choosing [facility]  for your care. Our goal is always to provide you with excellent care. Hearing back from our patients is one way we can continue to improve our services. Please take a few minutes to complete the written survey that you may receive in the mail after your visit  with us. Thank you!             Your Updated Medication List - Protect others around you: Learn how to safely use, store and throw away your medicines at www.disposemymeds.org.          This list is accurate as of 8/30/18 12:07 PM.  Always use your most recent med list.                   Brand Name Dispense Instructions for use Diagnosis    acetaminophen 500 MG tablet    TYLENOL     Take 500 mg by mouth every 6 hours as needed Take 1 tablet by mouth every 6 hours if needed. Max acetaminophen dose: 4000mg in 24 hrs        aspirin 81 MG chewable tablet      Take 81 mg by mouth daily with food Take 1 tablet by mouth once daily with a meal.        BRILINTA 90 MG tablet   Generic drug:  ticagrelor      Take 90 mg by mouth 2 times daily        fluorouracil 5 % cream    EFUDEX     Apply 1 Application topically At Bedtime APPLY ONCE DAILY AT BEDTIME FOR 4 WEEKS. WASH OFF IN THE MORNING        fluticasone 50 MCG/ACT spray    FLONASE     Spray 2 sprays in nostril daily Inhale 2 Sprays into both nostrils once daily.        levothyroxine 100 MCG tablet    SYNTHROID/LEVOTHROID    90 tablet    Take 1 tablet (100 mcg) by mouth every morning (before breakfast) Take 1 tablet by mouth before breakfast.    Acquired hypothyroidism       LIPITOR PO      Take 40 mg by mouth        metoprolol succinate 50 MG 24 hr tablet    TOPROL-XL    90 tablet    Take 1 tablet (50 mg) by mouth daily Take 1 tablet by mouth once daily.    ASCVD (arteriosclerotic cardiovascular disease)       nitroGLYcerin 0.4 MG sublingual tablet    NITROSTAT    25 tablet    For chest pain place 1 tablet under the tongue every 5 minutes for 3 doses. If symptoms persist 5 minutes after 1st dose call 911.    Atypical angina (H)       ramipril 2.5 MG capsule    ALTACE     Take 2.5 mg by mouth daily Take 1 capsule by mouth once daily.        triamcinolone 0.1 % ointment    KENALOG     Apply 1 Application topically 3 times daily Apply  topically to affected area(s) 3 times  daily.

## 2018-08-30 NOTE — PROGRESS NOTES
Cardiology Progress Note     Assessment & Plan   Sandro Yoon is a 73 year old male who is being seen by cardiology in follow-up to visit from 7/31/18.  He has been referred to [facility] secondary to exertional chest discomfort with history of coronary artery disease at his request.  He was found to have rather significant disease in his LAD. He is status post x2 stent placement.  He is currently in cardiac rehab.  He has been having some fullness/tenderness to his right axis site from his recent catheterization.  He is interested in doing an ultrasound to look for an AV fistula.  Brilinta is rather expensive but he had a rash on Plavix.  We discussed potentially looking at Prasugrel as an alternative.  Otherwise, he is doing well following his procedure and has no additional complaints.    Impression:  1.  History of cardiac catheterization on 8/14/18 ×2 to his LAD through [facility] in [place], [place].  2.  Concern for an arteriovenous fistula to his right groin versus hematoma at his access site.  3.  History of cardiac catheterization on 6/16/2005 and 9/3/2003 at [facility] with stenting to his LAD.  4.  Hypertension.  5.  Hypercholesteremia.  6.  Hypothyroidism.    Plan:  1.  He will continue Brilinta for 1 year.  His procedure was on 8/14/18.  He was told he can discontinue the medication afterwards.  However, he is aware that there is benefit after this timeframe.  2.  With significant swelling, induration, and a raised area to his right femoral groin, he will have an ultrasound to look for an AV fistula versus hematoma.  3.  He also will continue aspirin 81 mg daily, metoprolol 50 mg daily, Lipitor 40 mg in the evening, ramipril 2.5 mg daily, and sublingual nitro as needed for chest pain.  4.  He will be seen in 6 months follow-up or sooner with problems.      Tor Arguello    Interval History   Jon is being seen in follow-up to visit from 7/31/18.  He was having exertional jaw pain which was  previously his anginal equivalent.  He had a 3 block stent that he walks on a regular basis.  For the last 3 weeks, he had been having some jaw discomfort and having to stop at 1.5 blocks.  He was getting chest discomfort.  He denied nausea, vomiting, diaphoresis, or dizziness.  Previously, he had intrascapular pain but he was not having it at that time.      He went on to have a stress echo on 7/30/18.  His stress test was abnormal with a baseline EF 55-60% without wall motion abnormalities.  However, with stress, his EF increased to 65%, he had jaw pain, and he had akinesis within the LAD territory.     He went on to have a cardiac catheterization through [facility] at his request on 8/14/18.  He was found to have proximal 90% ostial stenosis, proximal 80% stenosis, and a 97% stenosis to the midportion of the vessel. He was found to have an 80% stenosis to his RCA.  He ultimately had ×2 stents placed to his proximal and distal LAD. His RCA was nondominant and small.    He had an echocardiogram on 7/30/18. His ejection fraction was 55-60%.  With stress, there was akinesis to the LAD territory. His EF increased to 65% with stress.  He also had some jaw pain with a normal blood pressure response.    Since being seen in [facility], his discomfort is now gone.  His exercise capabilities have increased and are better than they had been prior to the procedure.  They initially had tried access through his wrist.  However, they were unsuccessful in putting a stent to his wrist.  Ultimately, the had to go through his right groin.  This area is rather swollen and he describes a lot of bruising.  It was suggested he have an ultrasound for bruising versus a fistula and he was agreeable.  The cost of the Brilinta is rather high.  However, he did have a rash on Plavix.  Briefly, we did discuss Prasugrel as an alternative option if this were cheaper.  However, he appears to be comfortable with Brilinta.      Physical Exam        BP: 120/84 Pulse: 80            Vitals:    08/30/18 1110   Weight: 73 kg (161 lb)     Vital Signs with Ranges  Pulse:  [80] 80  BP: (120)/(84) 120/84  ROS is negative except that which was noted in the HPI.         Constitutional: awake, alert, cooperative, no apparent distress, and appears stated age  Eyes: Lids and lashes normal, pupils equal, sclera clear, conjunctiva normal  ENT: Normocephalic, without obvious abnormality, atraumatic.  Respiratory: No increased work of breathing, good air exchange, clear to auscultation bilaterally, no crackles or wheezing  Cardiovascular: Normal apical impulse, regular rate and rhythm, normal S1 and S2, no S3 or S4, and no murmur noted  Musculoskeletal: no lower extremity pitting edema present  Neurologic: Awake, alert, oriented to name, place and time.    Neuropsychiatric: General: normal, calm and normal eye contact    Medications         Data   No results found for this or any previous visit (from the past 24 hour(s)).

## 2018-08-31 ENCOUNTER — HOSPITAL ENCOUNTER (OUTPATIENT)
Dept: CARDIAC REHAB | Facility: OTHER | Age: 73
End: 2018-08-31
Attending: INTERNAL MEDICINE
Payer: MEDICARE

## 2018-08-31 PROCEDURE — 40000116 ZZH STATISTIC OP CR VISIT

## 2018-08-31 PROCEDURE — 93798 PHYS/QHP OP CAR RHAB W/ECG: CPT

## 2018-09-04 ENCOUNTER — HOSPITAL ENCOUNTER (OUTPATIENT)
Dept: ULTRASOUND IMAGING | Facility: OTHER | Age: 73
Discharge: HOME | End: 2018-09-04
Attending: INTERNAL MEDICINE | Admitting: INTERNAL MEDICINE
Payer: MEDICARE

## 2018-09-04 ENCOUNTER — HOSPITAL ENCOUNTER (OUTPATIENT)
Dept: PHYSICAL THERAPY | Facility: OTHER | Age: 73
Setting detail: THERAPIES SERIES
End: 2018-09-04
Attending: INTERNAL MEDICINE
Payer: MEDICARE

## 2018-09-04 DIAGNOSIS — I77.0 ARTERIOVENOUS FISTULA, ACQUIRED (H): ICD-10-CM

## 2018-09-04 PROCEDURE — 97110 THERAPEUTIC EXERCISES: CPT | Mod: GP

## 2018-09-04 PROCEDURE — 40000185 ZZHC STATISTIC PT OUTPT VISIT

## 2018-09-04 PROCEDURE — 97140 MANUAL THERAPY 1/> REGIONS: CPT | Mod: GP

## 2018-09-04 PROCEDURE — 76857 US EXAM PELVIC LIMITED: CPT

## 2018-09-05 ENCOUNTER — HOSPITAL ENCOUNTER (OUTPATIENT)
Dept: CARDIAC REHAB | Facility: OTHER | Age: 73
End: 2018-09-05
Attending: INTERNAL MEDICINE
Payer: MEDICARE

## 2018-09-05 ENCOUNTER — TELEPHONE (OUTPATIENT)
Dept: CARDIOLOGY | Facility: OTHER | Age: 73
End: 2018-09-05

## 2018-09-05 PROCEDURE — 93798 PHYS/QHP OP CAR RHAB W/ECG: CPT

## 2018-09-05 PROCEDURE — 40000116 ZZH STATISTIC OP CR VISIT

## 2018-09-05 NOTE — TELEPHONE ENCOUNTER
The patient is out of town tomorrow (9/6/18) and would like results of ultrasound called to the patient's wife's (Suzanne) cell phone at [phone].  Will just be out tomorrow and would like a call.

## 2018-09-06 NOTE — TELEPHONE ENCOUNTER
Call to pt to notify of the following per provider:  I wanted to give you a quick follow-up on the results of the ultrasound of your right hip that was just completed.  This study did not show any problems with your arteries and veins in that area.  It just showed some swelling which is likely a result of bruising from all the bleeding you had and some fluid buildup.  It will just need time to heal and that fluid and blood to be broken down.  I hope this helps!  Let me know if you have any questions.   Verbalizes understanding and has no further questions or concerns. Luz Maria Cooper RN on 9/6/2018 at 10:09 AM

## 2018-09-07 ENCOUNTER — HOSPITAL ENCOUNTER (OUTPATIENT)
Dept: CARDIAC REHAB | Facility: OTHER | Age: 73
End: 2018-09-07
Attending: INTERNAL MEDICINE
Payer: MEDICARE

## 2018-09-07 PROCEDURE — 40000116 ZZH STATISTIC OP CR VISIT

## 2018-09-07 PROCEDURE — 93798 PHYS/QHP OP CAR RHAB W/ECG: CPT

## 2018-09-10 ENCOUNTER — HOSPITAL ENCOUNTER (OUTPATIENT)
Dept: CARDIAC REHAB | Facility: OTHER | Age: 73
End: 2018-09-10
Attending: INTERNAL MEDICINE
Payer: MEDICARE

## 2018-09-10 PROCEDURE — 40000116 ZZH STATISTIC OP CR VISIT

## 2018-09-10 PROCEDURE — 93798 PHYS/QHP OP CAR RHAB W/ECG: CPT

## 2018-09-11 ENCOUNTER — HOSPITAL ENCOUNTER (OUTPATIENT)
Dept: PHYSICAL THERAPY | Facility: OTHER | Age: 73
Setting detail: THERAPIES SERIES
End: 2018-09-11
Attending: INTERNAL MEDICINE
Payer: MEDICARE

## 2018-09-11 PROCEDURE — 97110 THERAPEUTIC EXERCISES: CPT | Mod: GP

## 2018-09-11 PROCEDURE — 40000185 ZZHC STATISTIC PT OUTPT VISIT

## 2018-09-11 PROCEDURE — 97140 MANUAL THERAPY 1/> REGIONS: CPT | Mod: GP

## 2018-09-12 ENCOUNTER — HOSPITAL ENCOUNTER (OUTPATIENT)
Dept: CARDIAC REHAB | Facility: OTHER | Age: 73
End: 2018-09-12
Attending: INTERNAL MEDICINE
Payer: MEDICARE

## 2018-09-12 PROCEDURE — 93798 PHYS/QHP OP CAR RHAB W/ECG: CPT

## 2018-09-12 PROCEDURE — 40000116 ZZH STATISTIC OP CR VISIT

## 2018-09-13 ENCOUNTER — HOSPITAL ENCOUNTER (OUTPATIENT)
Dept: PHYSICAL THERAPY | Facility: OTHER | Age: 73
Setting detail: THERAPIES SERIES
End: 2018-09-13
Attending: INTERNAL MEDICINE
Payer: MEDICARE

## 2018-09-13 PROCEDURE — 40000185 ZZHC STATISTIC PT OUTPT VISIT

## 2018-09-13 PROCEDURE — 97110 THERAPEUTIC EXERCISES: CPT | Mod: GP

## 2018-09-13 PROCEDURE — 97140 MANUAL THERAPY 1/> REGIONS: CPT | Mod: GP

## 2018-09-14 ENCOUNTER — HOSPITAL ENCOUNTER (OUTPATIENT)
Dept: CARDIAC REHAB | Facility: OTHER | Age: 73
End: 2018-09-14
Attending: INTERNAL MEDICINE
Payer: MEDICARE

## 2018-09-14 PROCEDURE — 40000116 ZZH STATISTIC OP CR VISIT

## 2018-09-14 PROCEDURE — 93798 PHYS/QHP OP CAR RHAB W/ECG: CPT

## 2018-09-17 ENCOUNTER — HOSPITAL ENCOUNTER (OUTPATIENT)
Dept: CARDIAC REHAB | Facility: OTHER | Age: 73
End: 2018-09-17
Attending: INTERNAL MEDICINE
Payer: MEDICARE

## 2018-09-17 DIAGNOSIS — I25.10 ASCVD (ARTERIOSCLEROTIC CARDIOVASCULAR DISEASE): Primary | Chronic | ICD-10-CM

## 2018-09-17 PROCEDURE — 93798 PHYS/QHP OP CAR RHAB W/ECG: CPT

## 2018-09-17 PROCEDURE — 40000116 ZZH STATISTIC OP CR VISIT

## 2018-09-17 RX ORDER — RAMIPRIL 2.5 MG/1
2.5 CAPSULE ORAL DAILY
Qty: 90 CAPSULE | Refills: 3 | Status: SHIPPED | OUTPATIENT
Start: 2018-09-17 | End: 2019-05-30

## 2018-09-17 RX ORDER — ATORVASTATIN CALCIUM 40 MG/1
40 TABLET, FILM COATED ORAL DAILY
Qty: 90 TABLET | Refills: 3 | Status: SHIPPED | OUTPATIENT
Start: 2018-09-17 | End: 2019-04-16

## 2018-09-17 NOTE — TELEPHONE ENCOUNTER
Called patient and informed him his medications were refilled. No further questions.Christianne Dsouza LPN on 9/17/2018 at 11:59 AM

## 2018-09-18 ENCOUNTER — HOSPITAL ENCOUNTER (OUTPATIENT)
Dept: PHYSICAL THERAPY | Facility: OTHER | Age: 73
Setting detail: THERAPIES SERIES
End: 2018-09-18
Attending: INTERNAL MEDICINE
Payer: MEDICARE

## 2018-09-18 PROCEDURE — 40000185 ZZHC STATISTIC PT OUTPT VISIT

## 2018-09-18 PROCEDURE — 97140 MANUAL THERAPY 1/> REGIONS: CPT | Mod: GP

## 2018-09-19 ENCOUNTER — HOSPITAL ENCOUNTER (OUTPATIENT)
Dept: CARDIAC REHAB | Facility: OTHER | Age: 73
End: 2018-09-19
Attending: INTERNAL MEDICINE
Payer: MEDICARE

## 2018-09-19 VITALS — BODY MASS INDEX: 24.8 KG/M2 | HEIGHT: 67 IN | WEIGHT: 158 LBS

## 2018-09-19 PROCEDURE — 93798 PHYS/QHP OP CAR RHAB W/ECG: CPT

## 2018-09-19 PROCEDURE — 40000116 ZZH STATISTIC OP CR VISIT

## 2018-09-19 NOTE — PROVIDER NOTIFICATION
09/19/18 1200   Session   Session 30 Day Individualized Treatment Plan   Certified through this date 09/19/18   Cardiac Rehab Assessment   Cardiac Rehab Assessment 9/19/18 Patient has met goal of exercising 45 minutes 3 times per week. He is also walking 2 miles daily at home. He has met goal of increasing Omega 3 foods including salmon, nuts, avocado's, and uses olive oil regularily.  He plans to come one more visit, then continue his exercise at home. He is feeling well, and not having any problems. 8/21/18 Initial visit to cardiac rehab after 2 TAMMY placed in his LAD, due to restenosis. He was changed from Plavix to Brillinta. He had a bleed in his eye, he says is a side effect of Brillinta, and thinks he is getting a rash as well. His right arm is sore, 4/10 from angiogran, and remains bruised. His right groin is hard, and the site became sore with walking on the TM, He got in to see Dr. Turner, to have sites checked, and f/u after procedure, following this visit. He is walking around his block 3 times per day. Follows No Added Salt diet. He is to work on getting his HDL elevated, so discussed high omega foods. Wife is interested in the Nurtrition classes, as review.    General Information   Treatment Diagnosis Stent   Date of Treatment Diagnosis 08/14/18   Significant Past CV History Previous MI;Previous PCI   Comorbidities Previous MI   Lead up symptoms jaw pain   Hospital Location [facility] Discharge Date 08/15/18   Signs and Symptoms Post Hospital Discharge Other (see comments)  (sore right arm and groin, angiogram sites)   Outpatient Cardiac Rehab Start Date 08/21/18   Primary Physician Johnny   Primary Physician Follow Up Scheduled   Surgeon Dr. Kevyn Tripathi   Surgeon Follow Up Scheduled   Cardiologist Jos   Cardiologist Follow Up Scheduled   Ejection Fraction 55 per SE   Risk Stratification Low   Summary of Cath Report   Summary of Cath Report Available   Date Performed 08/14/18   LAD  "restenosis stent, restented x2   RCA 80%   Living and Work Status    Living Arrangements and Social Status house;spouse   Support System Live with an adult   Return to Employment Retired   Occupation Pharmacist   Pain   Patient Currently in Pain Yes;No   Pain Location right arm/shoulder  (Has only one more PT visit. He has increased ROM. )   Physical Assessments   Incisions WNL   Limitations No limitations   Individualized Treatment Plan   Monitored Sessions Scheduled 12   Monitored Sessions Attended 11   Nutrition Management - Weight Management   Weight 71.7 kg (158 lb)   Height 1.714 m (5' 7.48\")   BMI (Calculated) 24.45   Initial Rate Your Plate Score. Dietary tool to assess eating patterns. Scores range from 24 to 72. The higher the score the healthier the eating pattern. 49   Nutrition Management - Lipids   Lipids Labs Available   Date 07/26/17   Total Cholesterol 145   Triglycerides 132   HDL 67   LDL 52   Prescribed Lipid Medication Yes   Statin Intensity Moderate Intensity   Nutrition Management Summary   Dietary Recommendations Low Sodium;Low Fat   Stages of Change for Diet Compliance Action   Interventions Planned Attend Nutrition Education Class(es);Educate on Weight Management Principles;Educate on Benefits of Exercise  (offered)   Interventions In Progress or Completed Understands Weight Management Principles;Educated on Benefits of Exercise   Patient Goals Goal #1;Goal #2   Goal #1 Description Increase exercise   Goal #1 Target Date 09/19/18   Goal #1 Date Met 09/19/18   Goal #1 Progress Towards Goal met   Goal #2 Description Increase Corwith-3   Goal #2 Target Date 09/19/18   Goal #2 Date Met 09/19/18   Goal #2 Progress Towards Goal met   Nutrition Summary Comments food summery good score   Psychosocial Management   Psychosocial Assessment Re-assessment   Is there history of clinical depression or increased risk of depression? No previous history   Current Level of Stress per Patient Report Mild "   Current Coping Skills Has Positive Support System   Initial Patient Health Questionnaire -9 Score (PHQ-9) for depression. 5-9 Minimal symptoms, 10-14 Minor depression, 15-19 Major depression, moderately severe, > 20 Major depression, severe  0   Initial [facility] Survey score.  Quality of Life:   If total score > 25 review individual areas where patient rated a 4 or 5.  Consider patients current medical condition and what role that plays on the score.   Adjust treatment protocol to improve areas of concern.  Consider the following:  PHQ9 score, DASI, and re-assessment within the next 30 days to assist with developing treatments.  23   Patient Goal No   Other Core Components - Hypertension   History of or Diagnosis of Hypertension Yes   Currently taking Anti-Hypertensives Yes;Beta blocker;Ace Inhibitor   Hypertension Comments Controlled   Other Core Components Summary   Interventions Planned Educate on importance of maintaining low sodium diet   Interventions In Progress or Completed Educated on importance of maintaining low sodium diet   Activity/Exercise History   Activity/Exercise Assessment Re-assessment   Activity/Exercise Status prior to event? Was Physically Active   Number of Days Currently participating in Moderate Physical Activity? 6   Number of Days Currently performing  Aerobic Exercise (including rehab)? 0   Number of Minutes per Session Currently of Aerobic Exercise (average)? 0   Current Stage of Change (Physical Activity) Action   Current Stage of Change (Aerobic Exercise) Action   Patient Goals Goal #1   Goal #1 Description Exercise 45 minutes 3 times per week, by one month   Goal #1 Target Date 09/19/18   Goal #1 Date Met 09/19/18   Activity/Exercise Comments walking 2 miles per day at home.   Exercise Assessment   Resting HR 75 bpm   Exercise  bpm   Post Exercise HR 79 bpm   Resting /60   Exercise /60   Post Exercise /58   Effort Rating 4   Current MET Level 4.6    MET Level Goal 5   ECG Rhythm Normal sinus rhythm   Ectopy PVCs   Current Symptoms Denies symptoms   Exercise Prescription   Mode Treadmill   Duration/Time (50 minutes)   Frequency 3 daysweek   OMNI Effort Rating (0-10 Scale) 4-6/10   Progression Progress peak intensity by 1/2 MET per week   Recommended Home Exercise   Type of Exercise Walking   Frequency (days per week) 5   Duration (minutes per session) 30-45 min   Effort Rating Recommended 4-6/10   Current Home Exercise   Type of Exercise Walking   Frequency (days per week) 7   Duration (minutes per session) 45   Follow-up/On-going Support   Provider follow-up needed on the following Heart Rate   Comments HR in normal range at rest.   Learning Assessment   Learner Patient   Primary Language English   Preferred Learning Style Listening;Reading   Barriers to Learning No barriers noted   Patient Education   Education recommended Nutrition;Exercise Principles   Physician cosignature/electronic signature indicates approval of this ITP document. I have established, reviewed and made necessary changes to the individualized treatment plan and exercise prescription for this patient.

## 2018-09-20 ENCOUNTER — HOSPITAL ENCOUNTER (OUTPATIENT)
Dept: PHYSICAL THERAPY | Facility: OTHER | Age: 73
Setting detail: THERAPIES SERIES
End: 2018-09-20
Attending: INTERNAL MEDICINE
Payer: MEDICARE

## 2018-09-20 PROCEDURE — 40000185 ZZHC STATISTIC PT OUTPT VISIT

## 2018-09-20 PROCEDURE — G8982 BODY POS GOAL STATUS: HCPCS | Mod: GP,CI

## 2018-09-20 PROCEDURE — G8983 BODY POS D/C STATUS: HCPCS | Mod: GP,CI

## 2018-09-20 PROCEDURE — 97140 MANUAL THERAPY 1/> REGIONS: CPT | Mod: GP

## 2018-09-21 ENCOUNTER — HOSPITAL ENCOUNTER (OUTPATIENT)
Dept: CARDIAC REHAB | Facility: OTHER | Age: 73
End: 2018-09-21
Attending: INTERNAL MEDICINE
Payer: MEDICARE

## 2018-09-21 VITALS — BODY MASS INDEX: 24.4 KG/M2 | WEIGHT: 158 LBS

## 2018-09-21 PROCEDURE — 93798 PHYS/QHP OP CAR RHAB W/ECG: CPT

## 2018-09-21 PROCEDURE — 40000116 ZZH STATISTIC OP CR VISIT

## 2018-09-21 NOTE — PROVIDER NOTIFICATION
09/21/18 1000   Session   Session Discharge Note   Certified through this date 09/21/18   Cardiac Rehab Assessment   Cardiac Rehab Assessment 9/21/2018 Patient has attended 12 sessions at cardiac rehab and has met goals of increasing MET level and has not had pain.  Has met goals of improving meal preparation and weight loss. Patient has a great outlook and has made plans of attending [facility] on discharge and will be inttroducing [employer] and has been walking 2 miles daily.Has continued c/o right shoulder pain Has finished PT and will be continuting these home exercises at home  He is feeling well and not having any problelms was grateful for cardiac rehab exercise sessions to monitor heart rate and BP response9/19/18 Patient has met goal of exercising 45 minutes 3 times per week. He is also walking 2 miles daily at home. He has met goal of increasing Omega 3 foods including salmon, nuts, avocado's, and uses olive oil regularily.  He plans to come one more visit, then continue his exercise at home. He is feeling well, and not having any problems. 8/21/18 Initial visit to cardiac rehab after 2 TAMMY placed in his LAD, due to restenosis. He was changed from Plavix to Brillinta. He had a bleed in his eye, he says is a side effect of Brillinta, and thinks he is getting a rash as well. His right arm is sore, 4/10 from angiogran, and remains bruised. His right groin is hard, and the site became sore with walking on the TM, He got in to see Dr. Turner, to have sites checked, and f/u after procedure, following this visit. He is walking around his block 3 times per day. Follows No Added Salt diet. He is to work on getting his HDL elevated, so discussed high omega foods. Wife is interested in the Nurtrition classes, as review.    General Information   Treatment Diagnosis Stent   Date of Treatment Diagnosis 08/14/18   Significant Past CV History Previous MI;Previous PCI   Comorbidities Previous MI   Lead up symptoms  jaw pain   Hospital Location [facility] Discharge Date 08/15/18   Signs and Symptoms Post Hospital Discharge Other (see comments)  (sore right arm and groin, angiogram sites)   Outpatient Cardiac Rehab Start Date 08/21/18   Primary Physician Johnny   Primary Physician Follow Up Scheduled   Surgeon Dr. Kevyn Tripathi   Surgeon Follow Up Scheduled   Cardiologist Jos   Cardiologist Follow Up Scheduled   Ejection Fraction 55 per SE   Risk Stratification Low   Summary of Cath Report   Summary of Cath Report Available   Date Performed 08/14/18   LAD restenosis stent, restented x2   RCA 80%   Living and Work Status    Living Arrangements and Social Status house;spouse   Support System Live with an adult   Return to Employment Retired   Occupation Pharmacist   Pain   Patient Currently in Pain Yes   Pain Location right arm/shoulder  (Has only one more PT visit. He has increased ROM. )   Pain Rating 4   Pain Description (increased pain at night while llying on side/)   Pain Treatment Recommendations (has finished PT and will continue exercises at home)   Physical Assessments   Incisions WNL   Limitations No limitations   Individualized Treatment Plan   Monitored Sessions Scheduled 12   Monitored Sessions Attended 12   Nutrition Management - Weight Management   Weight 71.7 kg (158 lb)   Initial Rate Your Plate Score. Dietary tool to assess eating patterns. Scores range from 24 to 72. The higher the score the healthier the eating pattern. 49   Nutrition Management - Lipids   Lipids Labs Available   Date 07/26/17   Total Cholesterol 145   Triglycerides 132   HDL 67   LDL 52   Prescribed Lipid Medication Yes   Statin Intensity Moderate Intensity   Nutrition Management Summary   Dietary Recommendations Low Sodium;Low Fat   Stages of Change for Diet Compliance Action   Interventions Planned Attend Nutrition Education Class(es);Educate on Weight Management Principles;Educate on Benefits of Exercise  (offered)    Interventions In Progress or Completed Understands Weight Management Principles;Educated on Benefits of Exercise   Patient Goals Goal #1;Goal #2   Goal #1 Description Increase exercise   Goal #1 Target Date 09/19/18   Goal #1 Date Met 09/19/18   Goal #1 Progress Towards Goal met   Goal #2 Description Increase [place]-3   Goal #2 Target Date 09/19/18   Goal #2 Date Met 09/19/18   Goal #2 Progress Towards Goal met   Nutrition Summary Comments food summery good score   Psychosocial Management   Psychosocial Assessment Re-assessment   Is there history of clinical depression or increased risk of depression? No previous history   Current Level of Stress per Patient Report Mild   Current Coping Skills Has Positive Support System   Initial Patient Health Questionnaire -9 Score (PHQ-9) for depression. 5-9 Minimal symptoms, 10-14 Minor depression, 15-19 Major depression, moderately severe, > 20 Major depression, severe  0   Initial [facility] Survey score.  Quality of Life:   If total score > 25 review individual areas where patient rated a 4 or 5.  Consider patients current medical condition and what role that plays on the score.   Adjust treatment protocol to improve areas of concern.  Consider the following:  PHQ9 score, DASI, and re-assessment within the next 30 days to assist with developing treatments.  18   Patient Goal No   Other Core Components - Hypertension   History of or Diagnosis of Hypertension Yes   Currently taking Anti-Hypertensives Yes;Beta blocker;Ace Inhibitor   Hypertension Comments Controlled   Other Core Components Summary   Interventions Planned Educate on importance of maintaining low sodium diet   Interventions In Progress or Completed Educated on importance of maintaining low sodium diet   Activity/Exercise History   Activity/Exercise Assessment Re-assessment   Activity/Exercise Status prior to event? Was Physically Active   Number of Days Currently participating in Moderate Physical Activity?  6   Number of Days Currently performing  Aerobic Exercise (including rehab)? 0   Number of Minutes per Session Currently of Aerobic Exercise (average)? 0   Current Stage of Change (Physical Activity) Action   Current Stage of Change (Aerobic Exercise) Action   Patient Goals Goal #1   Goal #1 Description Exercise 45 minutes 3 times per week, by one month   Goal #1 Target Date 09/19/18   Goal #1 Date Met 09/19/18   Activity/Exercise Comments walking 2 miles per day at home.   Exercise Assessment   Resting HR 79 bpm   Exercise  bpm   Post Exercise HR 78 bpm   Resting /78   Exercise /70   Post Exercise /70   Effort Rating 4   Current MET Level 4.7   MET Level Goal 5   ECG Rhythm Normal sinus rhythm   Ectopy PVCs   Current Symptoms Denies symptoms   Exercise Prescription   Mode Treadmill   Duration/Time (50 minutes)   Frequency 3 daysweek   OMNI Effort Rating (0-10 Scale) 4-6/10   Progression Progress peak intensity by 1/2 MET per week   Recommended Home Exercise   Type of Exercise Walking   Frequency (days per week) 5   Duration (minutes per session) 30-45 min   Effort Rating Recommended 4-6/10   Current Home Exercise   Type of Exercise Walking   Frequency (days per week) 7   Duration (minutes per session) 45   Follow-up/On-going Support   Provider follow-up needed on the following Heart Rate   Comments HR in normal range at rest.   Learning Assessment   Learner Patient   Primary Language English   Preferred Learning Style Listening;Reading   Barriers to Learning No barriers noted   Patient Education   Education recommended Nutrition;Exercise Principles   Physician cosignature/electronic signature indicates approval of this ITP document. I have established, reviewed and made necessary changes to the individualized treatment plan and exercise prescription for this patient.

## 2018-10-16 RX ORDER — ATORVASTATIN CALCIUM 10 MG/1
TABLET, FILM COATED ORAL
Refills: 4 | COMMUNITY
Start: 2018-05-14 | End: 2018-10-17

## 2018-10-17 ENCOUNTER — OFFICE VISIT (OUTPATIENT)
Dept: FAMILY MEDICINE | Facility: OTHER | Age: 73
End: 2018-10-17
Attending: PHYSICIAN ASSISTANT
Payer: MEDICARE

## 2018-10-17 VITALS
HEART RATE: 84 BPM | DIASTOLIC BLOOD PRESSURE: 82 MMHG | SYSTOLIC BLOOD PRESSURE: 118 MMHG | RESPIRATION RATE: 18 BRPM | WEIGHT: 159 LBS | TEMPERATURE: 96.1 F | BODY MASS INDEX: 24.55 KG/M2

## 2018-10-17 DIAGNOSIS — H69.91 ACUTE DYSFUNCTION OF EUSTACHIAN TUBE, RIGHT: Primary | ICD-10-CM

## 2018-10-17 DIAGNOSIS — H61.23 BILATERAL IMPACTED CERUMEN: ICD-10-CM

## 2018-10-17 DIAGNOSIS — Z23 NEED FOR PROPHYLACTIC VACCINATION AND INOCULATION AGAINST INFLUENZA: ICD-10-CM

## 2018-10-17 PROCEDURE — 69209 REMOVE IMPACTED EAR WAX UNI: CPT | Performed by: PHYSICIAN ASSISTANT

## 2018-10-17 PROCEDURE — 90471 IMMUNIZATION ADMIN: CPT | Performed by: PHYSICIAN ASSISTANT

## 2018-10-17 PROCEDURE — 90662 IIV NO PRSV INCREASED AG IM: CPT | Performed by: PHYSICIAN ASSISTANT

## 2018-10-17 PROCEDURE — G0463 HOSPITAL OUTPT CLINIC VISIT: HCPCS | Mod: 25

## 2018-10-17 PROCEDURE — 99213 OFFICE O/P EST LOW 20 MIN: CPT | Performed by: PHYSICIAN ASSISTANT

## 2018-10-17 PROCEDURE — G0463 HOSPITAL OUTPT CLINIC VISIT: HCPCS

## 2018-10-17 NOTE — PATIENT INSTRUCTIONS
Encouraged to take loratadine daily for 2-4 weeks.   Can try benadryl at night.   Can use over-the-counter cough or cold remedies as needed.    Use a Neti Pot/sinusflush (John Med Sinus Rinse) 3 times daily to irrigate sinuses/mucosal tissue.     Sudafed or mucinex work well for congestion.   If you choose pseudoephedrine, use for only 5-7 days AS DIRECTED. Speak to your pharmacist if you have any concerns about your medications. Mayalso use decongestant nasal spray, but only for 3 days MAXIMUM.    Return to clinic with change/worsening of symptoms.

## 2018-10-17 NOTE — PROGRESS NOTES
Nursing Notes:   Adilia Elmore LPN  10/17/2018  8:59 AM  Signed  Patient presents to clinic here with right ear pain/plugged.  Alice Elmore LPN ...... 10/17/2018 8:54 AM        Adilia Elmore LPN  10/17/2018  9:44 AM  Signed  Both ear canal was irrigated withbody-temperature tap water with the jet of water directed superiorly.  The ear canal was then re-examined and cleared of the impaction.  The patient tolerated the procedure well.  Alice Elmore LPN....................  10/17/2018   9:44 AM      HPI:    Sandro Yoon is a 73 year old male who presents for right ear plugged sensation. Yellow snot.  Patient has a cough normally at his baseline that is unchanged. Hx ear wax impactions.  Sinus congestion and right maxillary sinus.  No sinus pain or pressure.  He is getting yellow nasal drainage.  No fevers, chills, wheezing, rattling, GI or urinary symptoms.  No ear pain.  No ear drainage.  Patient states that he lost his voice previously when he was using fluticasone nasal spray.  Declines wanting to restart the nasal spray at this time.    Patient would like a flu shot.    Past Medical History:   Diagnosis Date     Dizziness and giddiness     2001,episode     Other skin changes     perianal area     Transient global amnesia     hx of       Past Surgical History:   Procedure Laterality Date     APPENDECTOMY OPEN      No Comments Provided     COLONOSCOPY      2009,Due 2019     EXTRACAPSULAR CATARACT EXTRATION WITH INTRAOCULAR LENS IMPLANT      2007,left sided, cataract extraction and intraocular lens     EXTRACAPSULAR CATARACT EXTRATION WITH INTRAOCULAR LENS IMPLANT      2007,right sided, cataract  extraction and intraocular lens     HEART CATH, ANGIOPLASTY      2003,stenting, mid left anterior descending lesion     LAPAROSCOPIC CHOLECYSTECTOMY      2007,with cholangiogram     OTHER SURGICAL HISTORY      2005,956758,OTHER,90% LAD & 99% SECOND DIAGONAL     OTHER SURGICAL HISTORY       015841,OTHER,patient states sensitive to ANESTHESIA     OTHER SURGICAL HISTORY      11/17/15,[id number],TRANSURETHRAL RESECTION OF BLADDER TUMOR,Dr. Ras Fitch [employer] [facility]     SIGMOIDOSCOPY FLEXIBLE      1998     TONSILLECTOMY      No Comments Provided       Family History   Problem Relation Age of Onset     HEART DISEASE Mother      Heart Disease,CAD     HEART DISEASE Father      Heart Disease,CAD     Prostate Cancer Father 80     Cancer-prostate     HEART DISEASE Brother      Heart Disease,3 of 4 have CAD     Prostate Cancer Brother 40     Cancer-prostate     Other - See Comments Brother      CABG sdc young age     Prostate Cancer Brother 65     Cancer-prostate     Other - See Comments Brother      no heart disease     Other - See Comments Brother      CABG sdc young age 40s     Colon Cancer No family hx of      Cancer-colon     Anesthesia Reaction No family hx of      Anesthesia Problem     Blood Disease No family hx of      Blood Disease,no bleeding or clotting       Social History     Social History     Marital status:      Spouse name: N/A     Number of children: N/A     Years of education: N/A     Occupational History     Not on file.     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    08/07:    Eight minutes Nick protocol stress echo normal.    Never smoked. No coffee.  One soda daily.  Rare alcohol.    Likes to cross country ski and bike. .       Current Outpatient Prescriptions   Medication Sig Dispense Refill     acetaminophen (TYLENOL) 500 MG tablet Take 500 mg by mouth every 6 hours as needed Take 1 tablet by mouth every 6 hours if needed. Max acetaminophen dose: 4000mg in 24 hrs       aspirin 81 MG  chewable tablet Take 81 mg by mouth daily with food Take 1 tablet by mouth once daily with a meal.       atorvastatin (LIPITOR) 40 MG tablet Take 1 tablet (40 mg) by mouth daily 90 tablet 3     BRILINTA 90 MG tablet Take 90 mg by mouth 2 times daily  2     fluorouracil (EFUDEX) 5 % cream Apply 1 Application topically At Bedtime APPLY ONCE DAILY AT BEDTIME FOR 4 WEEKS. WASH OFF IN THE MORNING       fluticasone (FLONASE) 50 MCG/ACT spray Spray 2 sprays in nostril daily Inhale 2 Sprays into both nostrils once daily.       levothyroxine (SYNTHROID/LEVOTHROID) 100 MCG tablet Take 1 tablet (100 mcg) by mouth every morning (before breakfast) Take 1 tablet by mouth before breakfast. 90 tablet 3     metoprolol succinate (TOPROL-XL) 50 MG 24 hr tablet Take 1 tablet (50 mg) by mouth daily Take 1 tablet by mouth once daily. 90 tablet 3     nitroGLYcerin (NITROSTAT) 0.4 MG sublingual tablet For chest pain place 1 tablet under the tongue every 5 minutes for 3 doses. If symptoms persist 5 minutes after 1st dose call 911. 25 tablet 3     ramipril (ALTACE) 2.5 MG capsule Take 1 capsule (2.5 mg) by mouth daily Take 1 capsule by mouth once daily. 90 capsule 3     triamcinolone (KENALOG) 0.1 % ointment Apply 1 Application topically 3 times daily Apply  topically to affected area(s) 3 times daily.       [DISCONTINUED] atorvastatin (LIPITOR) 10 MG tablet TAKE 1/2 TABLET BY MOUTH AT BEDTIME  4       Allergies   Allergen Reactions     Hydrocodone-Acetaminophen Nausea and Vomiting     Simvastatin Rash     Thimerosal Swelling     Diatrizoate Rash     Plavix [Clopidogrel] Rash       REVIEW OF SYSTEMS:  Refer to HPI.    EXAM:   Vitals:    /82 (BP Location: Right arm, Patient Position: Sitting, Cuff Size: Adult Regular)  Pulse 84  Temp 96.1  F (35.6  C)  Resp 18  Wt 159 lb (72.1 kg)  BMI 24.55 kg/m2    General Appearance: Pleasant, alert, appropriate appearance for age. No acute distress  Ear Exam: Mild cerumen impaction appreciated  bilaterally.    Status post bilateral ear flush:   Normal TM's bilaterally, grey, translucent, bony landmarks appreciated.   Left/Right TM: Effusion is not present. TM is not bulging. There is no pus appreciated.    Normal auditory canals and external ears. Non-tender.  OroPharynx Exam:  Non-erythematous posterior pharynx with no exudates. No sinus pain upon palpation of frontal, ethmoid, and maxillary sinuses.  Chest/Respiratory Exam: Normal chest wall and respirations. Clear to auscultation. No retractions appreciated.  Cardiovascular Exam: Regular rate and rhythm. S1, S2, no murmur, click, gallop, or rubs.  Lymphatic Exam: Neg.  Skin: no rash or abnormalities  Psychiatric Exam: Alert and oriented - appropriate affect.    PHQ Depression Screen  PHQ-9 SCORE 10/11/2016 7/24/2018 7/31/2018   Total Score 5 0 0           ASSESSMENT AND PLAN:    1. Acute dysfunction of Eustachian tube, right    2. Need for prophylactic vaccination and inoculation against influenza    3. Bilateral impacted cerumen        Flushed out ears bilaterally with good success.  Patient tolerated the procedure well.    Right eustachian tube dysfunction:  Encouraged to take loratadine daily for 2-4 weeks.   Can try benadryl at night.   Can use over-the-counter cough or cold remedies as needed.    Use a Neti Pot/sinusflush (John Med Sinus Rinse) 3 times daily to irrigate sinuses/mucosal tissue.     Sudafed or mucinex work well for congestion.   If you choose pseudoephedrine, use for only 5-7 days AS DIRECTED. Speak to your pharmacist if you have any concerns about your medications. Mayalso use decongestant nasal spray, but only for 3 days MAXIMUM.    Return to clinic with change/worsening of symptoms.       Patient was given a flu shot today.    Patient Instructions   Encouraged to take loratadine daily for 2-4 weeks.   Can try benadryl at night.   Can use over-the-counter cough or cold remedies as needed.    Use a Neti Pot/sinusflush (John Med  Sinus Rinse) 3 times daily to irrigate sinuses/mucosal tissue.     Sudafed or mucinex work well for congestion.   If you choose pseudoephedrine, use for only 5-7 days AS DIRECTED. Speak to your pharmacist if you have any concerns about your medications. Mayalso use decongestant nasal spray, but only for 3 days MAXIMUM.    Return to clinic with change/worsening of symptoms.         Christi Gallagher PA-C..................10/17/2018 8:59 AM

## 2018-10-17 NOTE — NURSING NOTE
Both ear canal was irrigated withbody-temperature tap water with the jet of water directed superiorly.  The ear canal was then re-examined and cleared of the impaction.  The patient tolerated the procedure well.  Alice Elmore LPN....................  10/17/2018   9:44 AM     sustained

## 2018-10-17 NOTE — MR AVS SNAPSHOT
After Visit Summary   10/17/2018    Sandro Yoon    MRN: [id number]           Patient Information     Date Of Birth          1945        Visit Information        Provider Department      10/17/2018 8:45 AM Christi Gallagher PA-C [facility]        Today's Diagnoses     Need for prophylactic vaccination and inoculation against influenza    -  1      Care Instructions    Encouraged to take loratadine daily for 2-4 weeks.   Can try benadryl at night.   Can use over-the-counter cough or cold remedies as needed.    Use a Neti Pot/sinusflush (John Med Sinus Rinse) 3 times daily to irrigate sinuses/mucosal tissue.     Sudafed or mucinex work well for congestion.   If you choose pseudoephedrine, use for only 5-7 days AS DIRECTED. Speak to your pharmacist if you have any concerns about your medications. Mayalso use decongestant nasal spray, but only for 3 days MAXIMUM.    Return to clinic with change/worsening of symptoms.             Follow-ups after your visit        Follow-up notes from your care team     Return if symptoms worsen or fail to improve.      Who to contact     If you have questions or need follow up information about today's clinic visit or your schedule please contact [facility] directly at [phone].  Normal or non-critical lab and imaging results will be communicated to you by [employer], letter or phone within 4 business days after the clinic has received the results. If you do not hear from us within 7 days, please contact the clinic through [email or web address] or phone. If you have a critical or abnormal lab result, we will notify you by phone as soon as possible.  Submit refill requests through [employer] or call your pharmacy and they will forward the refill request to us. Please allow 3 business days for your refill to be completed.          Additional Information About Your Visit        [employer] Information     [employer] gives you secure access to  your electronic health record. If you see a primary care provider, you can also send messages to your care team and make appointments. If you have questions, please call your primary care clinic.  If you do not have a primary care provider, please call [phone] and they will assist you.        Care EveryWhere ID     This is your Care EveryWhere ID. This could be used by other organizations to access your [place] medical records  [id number]        Your Vitals Were     Pulse Temperature Respirations BMI (Body Mass Index)          84 96.1  F (35.6  C) 18 24.55 kg/m2         Blood Pressure from Last 3 Encounters:   10/17/18 118/82   08/30/18 120/84   08/21/18 102/66    Weight from Last 3 Encounters:   10/17/18 159 lb (72.1 kg)   09/21/18 158 lb (71.7 kg)   09/19/18 158 lb (71.7 kg)              We Performed the Following     HC FLU VACCINE, INCREASED ANTIGEN, PRESV FREE     Vaccine Administration, Initial [24762]          Today's Medication Changes          These changes are accurate as of 10/17/18  9:11 AM.  If you have any questions, ask your nurse or doctor.               These medicines have changed or have updated prescriptions.        Dose/Directions    atorvastatin 40 MG tablet   Commonly known as:  LIPITOR   This may have changed:  Another medication with the same name was removed. Continue taking this medication, and follow the directions you see here.   Used for:  ASCVD (arteriosclerotic cardiovascular disease)   Changed by:  Christi Gallagher PA-C        Dose:  40 mg   Take 1 tablet (40 mg) by mouth daily   Quantity:  90 tablet   Refills:  3                Primary Care Provider Office Phone # Fax #    Guero Jono Turner [phone] [address]  [facility] [address]        Equal Access to Services     [facility] MARINA AH: Trish Miller, jersey jacobson, noble estes. So [facility]  [phone].    ATENCIÓN: Si ranjana mercedes, tiene a robertson disposición servicios gratuitos de asistencia lingüística. Gloria tatum [phone].    We comply with applicable federal civil rights laws and [place] laws. We do not discriminate on the basis of race, color, national origin, age, disability, sex, sexual orientation, or gender identity.            Thank you!     Thank you for choosing [facility]  for your care. Our goal is always to provide you with excellent care. Hearing back from our patients is one way we can continue to improve our services. Please take a few minutes to complete the written survey that you may receive in the mail after your visit with us. Thank you!             Your Updated Medication List - Protect others around you: Learn how to safely use, store and throw away your medicines at www.disposemymeds.org.          This list is accurate as of 10/17/18  9:11 AM.  Always use your most recent med list.                   Brand Name Dispense Instructions for use Diagnosis    acetaminophen 500 MG tablet    TYLENOL     Take 500 mg by mouth every 6 hours as needed Take 1 tablet by mouth every 6 hours if needed. Max acetaminophen dose: 4000mg in 24 hrs        aspirin 81 MG chewable tablet      Take 81 mg by mouth daily with food Take 1 tablet by mouth once daily with a meal.        atorvastatin 40 MG tablet    LIPITOR    90 tablet    Take 1 tablet (40 mg) by mouth daily    ASCVD (arteriosclerotic cardiovascular disease)       BRILINTA 90 MG tablet   Generic drug:  ticagrelor      Take 90 mg by mouth 2 times daily        fluorouracil 5 % cream    EFUDEX     Apply 1 Application topically At Bedtime APPLY ONCE DAILY AT BEDTIME FOR 4 WEEKS. WASH OFF IN THE MORNING        fluticasone 50 MCG/ACT spray    FLONASE     Spray 2 sprays in nostril daily Inhale 2 Sprays into both nostrils once daily.        levothyroxine 100 MCG tablet    SYNTHROID/LEVOTHROID    90 tablet    Take 1 tablet  (100 mcg) by mouth every morning (before breakfast) Take 1 tablet by mouth before breakfast.    Acquired hypothyroidism       metoprolol succinate 50 MG 24 hr tablet    TOPROL-XL    90 tablet    Take 1 tablet (50 mg) by mouth daily Take 1 tablet by mouth once daily.    ASCVD (arteriosclerotic cardiovascular disease)       nitroGLYcerin 0.4 MG sublingual tablet    NITROSTAT    25 tablet    For chest pain place 1 tablet under the tongue every 5 minutes for 3 doses. If symptoms persist 5 minutes after 1st dose call 911.    Atypical angina (H)       ramipril 2.5 MG capsule    ALTACE    90 capsule    Take 1 capsule (2.5 mg) by mouth daily Take 1 capsule by mouth once daily.    ASCVD (arteriosclerotic cardiovascular disease)       triamcinolone 0.1 % ointment    KENALOG     Apply 1 Application topically 3 times daily Apply  topically to affected area(s) 3 times daily.

## 2018-10-17 NOTE — PROGRESS NOTES

## 2018-10-17 NOTE — NURSING NOTE
Patient presents to clinic here with right ear pain/plugged.  Alice Elmore LPN ...... 10/17/2018 8:54 AM

## 2018-11-07 NOTE — ADDENDUM NOTE
Encounter addended by: Sweetie Hodge, PT on: 11/7/2018  9:19 AM<BR>     Actions taken: Pend clinical note, Flowsheet accepted, Sign clinical note, Charge Capture section accepted, Episode resolved

## 2018-11-07 NOTE — PROGRESS NOTES
Outpatient Physical Therapy Discharge Note     Patient: Sandro Yoon  : 1945    Beginning/End Dates of Reporting Period:  18 to 2018    Referring Provider: Guero Turner MD    Therapy Diagnosis: right shoulder and arm pain, myofascial tightness and pain.      Client Self Report: Doing well, still will get uncomfortable lyingon shoulder to sleep. Able to reach and lift.     Objective Measurements:  Objective Measure: tissue loading  Details: equal loading and listening  Objective Measure: shoulder ROM right  Details: flexion 170, abduction 168          Goals:  Goal Identifier sleep   Goal Description Patient to return to sleeping without right arm discomfort.   Target Date 18   Date Met      Progress:     Goal Identifier weight bearing   Goal Description Patient to return to weight bearing through right upper extrmeity for yoga and cycling.   Target Date 18   Date Met  18   Progress:     Goal Identifier ADL   Goal Description Patient to mary ann/doff shirts without limitations or pain.    Target Date 18   Date Met  18   Progress:       Progress Toward Goals:   Progress this reporting period: all goals met except ability to lie on shoulder without discomfort for sleep          Plan:  Discharge from therapy.    Discharge:    Reason for Discharge: Patient has met all goals.    Equipment Issued: NA    Discharge Plan: Patient to continue home program.

## 2018-12-19 ENCOUNTER — TELEPHONE (OUTPATIENT)
Dept: SURGERY | Facility: OTHER | Age: 73
End: 2018-12-19

## 2018-12-19 NOTE — TELEPHONE ENCOUNTER
Patient called wanting to schedule a hernia repair.  He consulted with Dr. Verdin on 1.30.2018 and had declined at the time.  He is now ready to move forward with the procedure.  Please call to set up date.  Thank you!  Rukhsana Smith on 12/19/2018 at 11:18 AM

## 2018-12-19 NOTE — TELEPHONE ENCOUNTER
Notified patient and scheduled surgery for 1/9/19 for right inguinal hernia repair.  Transferred to appointment line to set up a H&P with Dr. Verdin prior to surgery.  Will review surgery folder at the time of appointment.Lamar Mckay LPN..........12/19/2018  1:33 PM

## 2018-12-19 NOTE — TELEPHONE ENCOUNTER
Would like to schedule surgery for hernia repair with Dr. Verdin.  Will get paperwork all together and speak with Dr. Verdin since it has been close to 1 year since we have seen him in the office.  He is fine with this.  Lamar Mckay LPN..........12/19/2018  11:45 AM

## 2018-12-21 ENCOUNTER — OFFICE VISIT (OUTPATIENT)
Dept: SURGERY | Facility: OTHER | Age: 73
End: 2018-12-21
Attending: SURGERY
Payer: COMMERCIAL

## 2018-12-21 VITALS
BODY MASS INDEX: 24.89 KG/M2 | HEIGHT: 68 IN | WEIGHT: 164.2 LBS | DIASTOLIC BLOOD PRESSURE: 80 MMHG | TEMPERATURE: 96.4 F | SYSTOLIC BLOOD PRESSURE: 132 MMHG

## 2018-12-21 DIAGNOSIS — K40.90 RIGHT INGUINAL HERNIA: ICD-10-CM

## 2018-12-21 PROCEDURE — 99215 OFFICE O/P EST HI 40 MIN: CPT | Performed by: SURGERY

## 2018-12-21 PROCEDURE — G0463 HOSPITAL OUTPT CLINIC VISIT: HCPCS

## 2018-12-21 RX ORDER — SODIUM CHLORIDE/ALOE VERA
GEL (GRAM) NASAL 4 TIMES DAILY PRN
COMMUNITY
End: 2019-09-10

## 2018-12-21 RX ORDER — UBIDECARENONE 100 MG
CAPSULE ORAL DAILY
COMMUNITY
End: 2019-03-08

## 2018-12-21 ASSESSMENT — PAIN SCALES - GENERAL: PAINLEVEL: MILD PAIN (2)

## 2018-12-21 ASSESSMENT — MIFFLIN-ST. JEOR: SCORE: 1459.31

## 2018-12-21 NOTE — PROGRESS NOTES
Primary Care Physician:Guero Turner MD    HPI:   The patient is 73 year old male with a bulge in the right groin. It has been present for years. It is getting larger and more uncomfortable Not causing pain severe pain. Denies nausea, vomiting, constipation and diarrhea. No problems with urinating. No testicular pain or swelling. Occasional pain in inner thigh.  No previous hernia surgery. Is on Brilita for recent stents-cardiologist said it's ok for surgery on the Brilinta.    CONSULTATION ASSESSMENT AND PLAN/RECOMMENDATIONS:   right inguinal hernia-I explained the pathophysiology of inguinal hernias to the patient. I discussed with the patient the risks, benefits and alternatives to repair of inguinal hernia with mesh. Specifically, we discussed the risks of infection, increased risk bleeding, injury to the testicle and testicular function, injury to abdominal organs, the possibility of hernia recurrence and the possibility of chronic pain. We also discussed the expected post operative limitations and recovery time. The patient expressed understanding. He wishes to proceed. He will call with questions or concerns. Informed consent paperwork was completed.   He won't take the Brilinta the morning of surgery but will after the surgery.    REVIEW OF SYSTEMS  GENERAL: No fevers or chills. Denies fatigue, recent weight loss.  HEENT: No sinus drainage. No changes with vision or hearing. No difficulty swallowing.   LYMPHATICS:  No swollen nodes in axilla, neck or groin.  CARDIOVASCULAR: Denies chest pain, palpitations and dyspnea on exertion.  PULMONARY: No shortness of breath or cough. No increase in sputum production.  GI: Denies melena, bright red blood in stools. No hematemesis. No constipation or diarrhea.  : No dysuria or hematuria.  SKIN: No recent rashes or ulcers.   HEMATOLOGY:  Has some easy bruising and bleeding.  ENDOCRINE:  No history of diabetes or thyroid problems.  NEUROLOGY:  No history of  seizures or headaches. No motor or sensory changes.  Past Medical History:   Diagnosis Date     Dizziness and giddiness     2001,episode     Other skin changes     perianal area     Transient global amnesia     hx of     Past Surgical History:   Procedure Laterality Date     APPENDECTOMY OPEN      No Comments Provided     COLONOSCOPY      2009,Due 2019     EXTRACAPSULAR CATARACT EXTRATION WITH INTRAOCULAR LENS IMPLANT      2007,left sided, cataract extraction and intraocular lens     EXTRACAPSULAR CATARACT EXTRATION WITH INTRAOCULAR LENS IMPLANT      2007,right sided, cataract  extraction and intraocular lens     HEART CATH, ANGIOPLASTY      2003,stenting, mid left anterior descending lesion     LAPAROSCOPIC CHOLECYSTECTOMY      2007,with cholangiogram     OTHER SURGICAL HISTORY      2005,[id number],OTHER,90% LAD & 99% SECOND DIAGONAL     OTHER SURGICAL HISTORY      [id number],OTHER,patient states sensitive to ANESTHESIA     OTHER SURGICAL HISTORY      11/17/15,[id number],TRANSURETHRAL RESECTION OF BLADDER TUMOR,Dr. Ras LUNA     SIGMOIDOSCOPY FLEXIBLE      1998     TONSILLECTOMY      No Comments Provided     CURRENT MEDS  Current Outpatient Medications   Medication     acetaminophen (TYLENOL) 500 MG tablet     aspirin 81 MG chewable tablet     atorvastatin (LIPITOR) 40 MG tablet     BRILINTA 90 MG tablet     co-enzyme Q-10 100 MG CAPS capsule     fluorouracil (EFUDEX) 5 % cream     fluticasone (FLONASE) 50 MCG/ACT spray     levothyroxine (SYNTHROID/LEVOTHROID) 100 MCG tablet     metoprolol succinate (TOPROL-XL) 50 MG 24 hr tablet     nitroGLYcerin (NITROSTAT) 0.4 MG sublingual tablet     ramipril (ALTACE) 2.5 MG capsule     saline nasal (AYR SALINE) GEL topical gel     triamcinolone (KENALOG) 0.1 % ointment     No current facility-administered medications for this visit.      ALLERGIES/SENSITIVITIES  Allergies   Allergen Reactions     Hydrocodone-Acetaminophen Nausea and Vomiting     Simvastatin Rash     Thimerosal  Swelling     Diatrizoate Rash     Plavix [Clopidogrel] Rash     Family History   Problem Relation Age of Onset     Heart Disease Mother         Heart Disease,CAD     Heart Disease Father         Heart Disease,CAD     Prostate Cancer Father 80        Cancer-prostate     Heart Disease Brother         Heart Disease,3 of 4 have CAD     Prostate Cancer Brother 40        Cancer-prostate     Other - See Comments Brother         CABG sdc young age     Prostate Cancer Brother 65        Cancer-prostate     Other - See Comments Brother         no heart disease     Other - See Comments Brother         CABG sdc young age 40s     Colon Cancer No family hx of         Cancer-colon     Anesthesia Reaction No family hx of         Anesthesia Problem     Blood Disease No family hx of         Blood Disease,no bleeding or clotting     Social History     Socioeconomic History     Marital status:      Spouse name: None     Number of children: None     Years of education: None     Highest education level: None   Social Needs     Financial resource strain: None     Food insecurity - worry: None     Food insecurity - inability: None     Transportation needs - medical: None     Transportation needs - non-medical: None   Occupational History     None   Tobacco Use     Smoking status: Never Smoker     Smokeless tobacco: Never Used   Substance and Sexual Activity     Alcohol use: Yes     Comment: Alcoholic Drinks/day: occasionally     Drug use: Unknown     Types: Other     Comment: Drug use: No     Sexual activity: None   Other Topics Concern     None   Social History Narrative    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    08/07:    Eight minutes Nick protocol stress echo normal.    Never smoked. No coffee.  One soda daily.  Rare alcohol.    Likes to cross country ski and bike. .        PHYSICAL EXAM  Vitals: /80 (BP Location: Right arm, Patient Position: Sitting, Cuff Size: Adult Regular)   Temp 96.4  F (35.8  C) (Temporal)   Ht  "1.727 m (5' 8\")   Wt 74.5 kg (164 lb 3.2 oz)   BMI 24.97 kg/m    BMI= Body mass index is 24.97 kg/m .  GENERAL:Healthy appearing patient in no acute distress. Pleasant and cooperative with exam and interview.   HEENT: Head-normocephalic. Eyes-no scleral icterus, pupils equal, round, and reactive to light. Nose-no nasal drainage.No lesions. Mouth-oral mucosa pink and moist, no lesions.  NECK: Supple. No thyroid nodules. Trachea midline.  LYMPHATICS:  No cervical, axillary or supraclavicular adenopathy.  CV: Regular rate and rhythm, no murmur. No peripheral edema.  LUNGS:  No respiratory distress. Clear bilaterally to auscultation.  ABDOMEN: Non distended. Bowel sounds active. Soft, non-tender, no hepatosplenomegaly or umbilical hernia. No peritoneal signs.  : right inguinal hernia noted. Reducible, mild tenderness. No hernia noted on left. Testicles descended bilaterally. No tenderness or mass bilaterally.  SKIN: Pink, warm and dry. No jaundice. No rash.  NEURO:  Cranial nerves II-XII grossly intact. Alert and oriented.  PSYCH: Appropriate mood and affect.    "

## 2018-12-21 NOTE — H&P (VIEW-ONLY)
Primary Care Physician:Guero Turner MD    HPI:   The patient is 73 year old male with a bulge in the right groin. It has been present for years. It is getting larger and more uncomfortable Not causing pain severe pain. Denies nausea, vomiting, constipation and diarrhea. No problems with urinating. No testicular pain or swelling. Occasional pain in inner thigh.  No previous hernia surgery. Is on Brilita for recent stents-cardiologist said it's ok for surgery on the Brilinta.    CONSULTATION ASSESSMENT AND PLAN/RECOMMENDATIONS:   right inguinal hernia-I explained the pathophysiology of inguinal hernias to the patient. I discussed with the patient the risks, benefits and alternatives to repair of inguinal hernia with mesh. Specifically, we discussed the risks of infection, increased risk bleeding, injury to the testicle and testicular function, injury to abdominal organs, the possibility of hernia recurrence and the possibility of chronic pain. We also discussed the expected post operative limitations and recovery time. The patient expressed understanding. He wishes to proceed. He will call with questions or concerns. Informed consent paperwork was completed.   He won't take the Brilinta the morning of surgery but will after the surgery.    REVIEW OF SYSTEMS  GENERAL: No fevers or chills. Denies fatigue, recent weight loss.  HEENT: No sinus drainage. No changes with vision or hearing. No difficulty swallowing.   LYMPHATICS:  No swollen nodes in axilla, neck or groin.  CARDIOVASCULAR: Denies chest pain, palpitations and dyspnea on exertion.  PULMONARY: No shortness of breath or cough. No increase in sputum production.  GI: Denies melena, bright red blood in stools. No hematemesis. No constipation or diarrhea.  : No dysuria or hematuria.  SKIN: No recent rashes or ulcers.   HEMATOLOGY:  Has some easy bruising and bleeding.  ENDOCRINE:  No history of diabetes or thyroid problems.  NEUROLOGY:  No history of  seizures or headaches. No motor or sensory changes.  Past Medical History:   Diagnosis Date     Dizziness and giddiness     2001,episode     Other skin changes     perianal area     Transient global amnesia     hx of     Past Surgical History:   Procedure Laterality Date     APPENDECTOMY OPEN      No Comments Provided     COLONOSCOPY      2009,Due 2019     EXTRACAPSULAR CATARACT EXTRATION WITH INTRAOCULAR LENS IMPLANT      2007,left sided, cataract extraction and intraocular lens     EXTRACAPSULAR CATARACT EXTRATION WITH INTRAOCULAR LENS IMPLANT      2007,right sided, cataract  extraction and intraocular lens     HEART CATH, ANGIOPLASTY      2003,stenting, mid left anterior descending lesion     LAPAROSCOPIC CHOLECYSTECTOMY      2007,with cholangiogram     OTHER SURGICAL HISTORY      2005,796641,OTHER,90% LAD & 99% SECOND DIAGONAL     OTHER SURGICAL HISTORY      [id number],OTHER,patient states sensitive to ANESTHESIA     OTHER SURGICAL HISTORY      11/17/15,[id number],TRANSURETHRAL RESECTION OF BLADDER TUMOR,Dr. Ras LUNA     SIGMOIDOSCOPY FLEXIBLE      1998     TONSILLECTOMY      No Comments Provided     CURRENT MEDS  Current Outpatient Medications   Medication     acetaminophen (TYLENOL) 500 MG tablet     aspirin 81 MG chewable tablet     atorvastatin (LIPITOR) 40 MG tablet     BRILINTA 90 MG tablet     co-enzyme Q-10 100 MG CAPS capsule     fluorouracil (EFUDEX) 5 % cream     fluticasone (FLONASE) 50 MCG/ACT spray     levothyroxine (SYNTHROID/LEVOTHROID) 100 MCG tablet     metoprolol succinate (TOPROL-XL) 50 MG 24 hr tablet     nitroGLYcerin (NITROSTAT) 0.4 MG sublingual tablet     ramipril (ALTACE) 2.5 MG capsule     saline nasal (AYR SALINE) GEL topical gel     triamcinolone (KENALOG) 0.1 % ointment     No current facility-administered medications for this visit.      ALLERGIES/SENSITIVITIES  Allergies   Allergen Reactions     Hydrocodone-Acetaminophen Nausea and Vomiting     Simvastatin Rash     Thimerosal  Swelling     Diatrizoate Rash     Plavix [Clopidogrel] Rash     Family History   Problem Relation Age of Onset     Heart Disease Mother         Heart Disease,CAD     Heart Disease Father         Heart Disease,CAD     Prostate Cancer Father 80        Cancer-prostate     Heart Disease Brother         Heart Disease,3 of 4 have CAD     Prostate Cancer Brother 40        Cancer-prostate     Other - See Comments Brother         CABG sdc young age     Prostate Cancer Brother 65        Cancer-prostate     Other - See Comments Brother         no heart disease     Other - See Comments Brother         CABG sdc young age 40s     Colon Cancer No family hx of         Cancer-colon     Anesthesia Reaction No family hx of         Anesthesia Problem     Blood Disease No family hx of         Blood Disease,no bleeding or clotting     Social History     Socioeconomic History     Marital status:      Spouse name: None     Number of children: None     Years of education: None     Highest education level: None   Social Needs     Financial resource strain: None     Food insecurity - worry: None     Food insecurity - inability: None     Transportation needs - medical: None     Transportation needs - non-medical: None   Occupational History     None   Tobacco Use     Smoking status: Never Smoker     Smokeless tobacco: Never Used   Substance and Sexual Activity     Alcohol use: Yes     Comment: Alcoholic Drinks/day: occasionally     Drug use: Unknown     Types: Other     Comment: Drug use: No     Sexual activity: None   Other Topics Concern     None   Social History Narrative    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    08/07:    Eight minutes Nick protocol stress echo normal.    Never smoked. No coffee.  One soda daily.  Rare alcohol.    Likes to cross country ski and bike. .        PHYSICAL EXAM  Vitals: /80 (BP Location: Right arm, Patient Position: Sitting, Cuff Size: Adult Regular)   Temp 96.4  F (35.8  C) (Temporal)   Ht  "1.727 m (5' 8\")   Wt 74.5 kg (164 lb 3.2 oz)   BMI 24.97 kg/m    BMI= Body mass index is 24.97 kg/m .  GENERAL:Healthy appearing patient in no acute distress. Pleasant and cooperative with exam and interview.   HEENT: Head-normocephalic. Eyes-no scleral icterus, pupils equal, round, and reactive to light. Nose-no nasal drainage.No lesions. Mouth-oral mucosa pink and moist, no lesions.  NECK: Supple. No thyroid nodules. Trachea midline.  LYMPHATICS:  No cervical, axillary or supraclavicular adenopathy.  CV: Regular rate and rhythm, no murmur. No peripheral edema.  LUNGS:  No respiratory distress. Clear bilaterally to auscultation.  ABDOMEN: Non distended. Bowel sounds active. Soft, non-tender, no hepatosplenomegaly or umbilical hernia. No peritoneal signs.  : right inguinal hernia noted. Reducible, mild tenderness. No hernia noted on left. Testicles descended bilaterally. No tenderness or mass bilaterally.  SKIN: Pink, warm and dry. No jaundice. No rash.  NEURO:  Cranial nerves II-XII grossly intact. Alert and oriented.  PSYCH: Appropriate mood and affect.    "

## 2018-12-21 NOTE — NURSING NOTE
"Chief Complaint   Patient presents with     Pre-Op Exam     Hernia       Initial /80 (BP Location: Right arm, Patient Position: Sitting, Cuff Size: Adult Regular)   Temp 96.4  F (35.8  C) (Temporal)   Ht 1.727 m (5' 8\")   Wt 74.5 kg (164 lb 3.2 oz)   BMI 24.97 kg/m   Estimated body mass index is 24.97 kg/m  as calculated from the following:    Height as of this encounter: 1.727 m (5' 8\").    Weight as of this encounter: 74.5 kg (164 lb 3.2 oz).  Medication Reconciliation: complete    Meron Eduardo LPN  "

## 2019-01-08 ENCOUNTER — ANESTHESIA EVENT (OUTPATIENT)
Dept: SURGERY | Facility: OTHER | Age: 74
End: 2019-01-08
Payer: MEDICARE

## 2019-01-09 ENCOUNTER — ANESTHESIA (OUTPATIENT)
Dept: SURGERY | Facility: OTHER | Age: 74
End: 2019-01-09
Payer: MEDICARE

## 2019-01-09 ENCOUNTER — HOSPITAL ENCOUNTER (OUTPATIENT)
Facility: OTHER | Age: 74
Discharge: HOME | End: 2019-01-09
Attending: SURGERY | Admitting: SURGERY
Payer: MEDICARE

## 2019-01-09 VITALS
OXYGEN SATURATION: 96 % | DIASTOLIC BLOOD PRESSURE: 68 MMHG | SYSTOLIC BLOOD PRESSURE: 106 MMHG | WEIGHT: 157 LBS | HEART RATE: 73 BPM | RESPIRATION RATE: 16 BRPM | BODY MASS INDEX: 23.87 KG/M2 | TEMPERATURE: 96.8 F

## 2019-01-09 DIAGNOSIS — K40.90 RIGHT INGUINAL HERNIA: Primary | ICD-10-CM

## 2019-01-09 DIAGNOSIS — Z79.01 ANTICOAGULANT LONG-TERM USE: ICD-10-CM

## 2019-01-09 PROCEDURE — 49505 PRP I/HERN INIT REDUC >5 YR: CPT | Performed by: NURSE ANESTHETIST, CERTIFIED REGISTERED

## 2019-01-09 PROCEDURE — 25000128 H RX IP 250 OP 636: Performed by: NURSE ANESTHETIST, CERTIFIED REGISTERED

## 2019-01-09 PROCEDURE — 49505 PRP I/HERN INIT REDUC >5 YR: CPT | Performed by: SURGERY

## 2019-01-09 PROCEDURE — 27210794 ZZH OR GENERAL SUPPLY STERILE: Performed by: SURGERY

## 2019-01-09 PROCEDURE — 25000128 H RX IP 250 OP 636: Performed by: SURGERY

## 2019-01-09 PROCEDURE — 71000027 ZZH RECOVERY PHASE 2 EACH 15 MINS: Performed by: SURGERY

## 2019-01-09 PROCEDURE — 25000566 ZZH SEVOFLURANE, EA 15 MIN: Performed by: SURGERY

## 2019-01-09 PROCEDURE — 36000052 ZZH SURGERY LEVEL 2 EA 15 ADDTL MIN: Performed by: SURGERY

## 2019-01-09 PROCEDURE — 37000008 ZZH ANESTHESIA TECHNICAL FEE, 1ST 30 MIN: Performed by: SURGERY

## 2019-01-09 PROCEDURE — 40000306 ZZH STATISTIC PRE PROC ASSESS II: Performed by: SURGERY

## 2019-01-09 PROCEDURE — 25000125 ZZHC RX 250: Performed by: SURGERY

## 2019-01-09 PROCEDURE — 25000125 ZZHC RX 250: Performed by: NURSE ANESTHETIST, CERTIFIED REGISTERED

## 2019-01-09 PROCEDURE — 36000050 ZZH SURGERY LEVEL 2 1ST 30 MIN: Performed by: SURGERY

## 2019-01-09 PROCEDURE — C1781 MESH (IMPLANTABLE): HCPCS | Performed by: SURGERY

## 2019-01-09 PROCEDURE — 99100 ANES PT EXTEME AGE<1 YR&>70: CPT | Performed by: NURSE ANESTHETIST, CERTIFIED REGISTERED

## 2019-01-09 PROCEDURE — 37000009 ZZH ANESTHESIA TECHNICAL FEE, EACH ADDTL 15 MIN: Performed by: SURGERY

## 2019-01-09 DEVICE — MESH HERNIA PROLITE 4X6" 1000406-00: Type: IMPLANTABLE DEVICE | Site: INGUINAL | Status: FUNCTIONAL

## 2019-01-09 RX ORDER — LIDOCAINE 40 MG/G
CREAM TOPICAL
Status: DISCONTINUED | OUTPATIENT
Start: 2019-01-09 | End: 2019-01-09 | Stop reason: HOSPADM

## 2019-01-09 RX ORDER — ONDANSETRON 2 MG/ML
4 INJECTION INTRAMUSCULAR; INTRAVENOUS EVERY 30 MIN PRN
Status: DISCONTINUED | OUTPATIENT
Start: 2019-01-09 | End: 2019-01-09 | Stop reason: HOSPADM

## 2019-01-09 RX ORDER — SODIUM CHLORIDE, SODIUM LACTATE, POTASSIUM CHLORIDE, CALCIUM CHLORIDE 600; 310; 30; 20 MG/100ML; MG/100ML; MG/100ML; MG/100ML
INJECTION, SOLUTION INTRAVENOUS CONTINUOUS
Status: DISCONTINUED | OUTPATIENT
Start: 2019-01-09 | End: 2019-01-09 | Stop reason: HOSPADM

## 2019-01-09 RX ORDER — OXYCODONE AND ACETAMINOPHEN 5; 325 MG/1; MG/1
1 TABLET ORAL ONCE
Status: DISCONTINUED | OUTPATIENT
Start: 2019-01-09 | End: 2019-01-09 | Stop reason: HOSPADM

## 2019-01-09 RX ORDER — CEFAZOLIN SODIUM 2 G/100ML
2 INJECTION, SOLUTION INTRAVENOUS
Status: COMPLETED | OUTPATIENT
Start: 2019-01-09 | End: 2019-01-09

## 2019-01-09 RX ORDER — BUPIVACAINE HYDROCHLORIDE AND EPINEPHRINE 5; 5 MG/ML; UG/ML
INJECTION, SOLUTION EPIDURAL; INTRACAUDAL; PERINEURAL PRN
Status: DISCONTINUED | OUTPATIENT
Start: 2019-01-09 | End: 2019-01-09 | Stop reason: HOSPADM

## 2019-01-09 RX ORDER — ACETAMINOPHEN 325 MG/1
975 TABLET ORAL ONCE
Status: DISCONTINUED | OUTPATIENT
Start: 2019-01-09 | End: 2019-01-09 | Stop reason: HOSPADM

## 2019-01-09 RX ORDER — NALOXONE HYDROCHLORIDE 0.4 MG/ML
.1-.4 INJECTION, SOLUTION INTRAMUSCULAR; INTRAVENOUS; SUBCUTANEOUS
Status: DISCONTINUED | OUTPATIENT
Start: 2019-01-09 | End: 2019-01-09 | Stop reason: HOSPADM

## 2019-01-09 RX ORDER — PROPOFOL 10 MG/ML
INJECTION, EMULSION INTRAVENOUS CONTINUOUS PRN
Status: DISCONTINUED | OUTPATIENT
Start: 2019-01-09 | End: 2019-01-09

## 2019-01-09 RX ORDER — ONDANSETRON 2 MG/ML
INJECTION INTRAMUSCULAR; INTRAVENOUS PRN
Status: DISCONTINUED | OUTPATIENT
Start: 2019-01-09 | End: 2019-01-09

## 2019-01-09 RX ORDER — CEFAZOLIN SODIUM 1 G/50ML
1 INJECTION, SOLUTION INTRAVENOUS SEE ADMIN INSTRUCTIONS
Status: DISCONTINUED | OUTPATIENT
Start: 2019-01-09 | End: 2019-01-09 | Stop reason: HOSPADM

## 2019-01-09 RX ORDER — PROPOFOL 10 MG/ML
INJECTION, EMULSION INTRAVENOUS PRN
Status: DISCONTINUED | OUTPATIENT
Start: 2019-01-09 | End: 2019-01-09

## 2019-01-09 RX ORDER — KETAMINE HYDROCHLORIDE 50 MG/ML
INJECTION, SOLUTION INTRAMUSCULAR; INTRAVENOUS PRN
Status: DISCONTINUED | OUTPATIENT
Start: 2019-01-09 | End: 2019-01-09

## 2019-01-09 RX ORDER — ONDANSETRON 4 MG/1
4 TABLET, ORALLY DISINTEGRATING ORAL EVERY 30 MIN PRN
Status: DISCONTINUED | OUTPATIENT
Start: 2019-01-09 | End: 2019-01-09 | Stop reason: HOSPADM

## 2019-01-09 RX ORDER — FENTANYL CITRATE 50 UG/ML
25-50 INJECTION, SOLUTION INTRAMUSCULAR; INTRAVENOUS
Status: DISCONTINUED | OUTPATIENT
Start: 2019-01-09 | End: 2019-01-09 | Stop reason: HOSPADM

## 2019-01-09 RX ADMIN — KETAMINE HYDROCHLORIDE 30 MG: 50 INJECTION, SOLUTION INTRAMUSCULAR; INTRAVENOUS at 08:08

## 2019-01-09 RX ADMIN — LIDOCAINE HYDROCHLORIDE 1 ML: 10 INJECTION, SOLUTION EPIDURAL; INFILTRATION; INTRACAUDAL; PERINEURAL at 07:34

## 2019-01-09 RX ADMIN — PROPOFOL 125 MCG/KG/MIN: 10 INJECTION, EMULSION INTRAVENOUS at 08:09

## 2019-01-09 RX ADMIN — SODIUM CHLORIDE, SODIUM LACTATE, POTASSIUM CHLORIDE, AND CALCIUM CHLORIDE: 600; 310; 30; 20 INJECTION, SOLUTION INTRAVENOUS at 08:49

## 2019-01-09 RX ADMIN — PROPOFOL 30 MG: 10 INJECTION, EMULSION INTRAVENOUS at 08:09

## 2019-01-09 RX ADMIN — ONDANSETRON 4 MG: 2 INJECTION INTRAMUSCULAR; INTRAVENOUS at 08:10

## 2019-01-09 RX ADMIN — SODIUM CHLORIDE, SODIUM LACTATE, POTASSIUM CHLORIDE, AND CALCIUM CHLORIDE: 600; 310; 30; 20 INJECTION, SOLUTION INTRAVENOUS at 07:34

## 2019-01-09 RX ADMIN — CEFAZOLIN SODIUM 2 G: 2 INJECTION, SOLUTION INTRAVENOUS at 08:08

## 2019-01-09 RX ADMIN — MIDAZOLAM 2 MG: 1 INJECTION INTRAMUSCULAR; INTRAVENOUS at 08:08

## 2019-01-09 NOTE — OP NOTE
Preoperative Diagnosis: right Inguinal Hernia     Postoperative Diagnosis: right Inguinal Hernia     Procedure planned: Repair right inguinal hernia withmesh     Procedure performed: Repair right inguinal hernia with mesh     Surgeon: Aurora Verdin MD     Circulator: Meron Sosa RN  Scrub Person: Amanda Pepe  First Assistant: Nelida Brock RN  2nd Scrub: Maxi Martinez  Pre-Op Nurse: Felice Young RN  Anesthesia: Monitored anesthesia care, local     Specimen: none     Estimated Blood Loss: minimal    INDICATIONS   Please see the consultation. The patient has been having discomfort associated with a bulge in the right groin. The risks, benefits and alternatives to repair of inguinal hernia with meshwere discussed with the patient. We specifically discussed the risks of infection, bleeding, injury to abdominal organs and testicle, mesh complication and hernia recurrence. The patient expressed understanding and questions were answered. Informed consent paperwork was completed.    DESCRIPTION OF PROCEDURE   The patient was brought to the operating room and placed in a supine position on the operating table. Appropriate monitors were attached. The patient received IV antibiotics preoperatively. After sedation was initiated, the patient was positioned, prepped and draped in the standard fashion. Time out was performed confirming the patient's identity and procedure to be performed.   Local anesthetic was infiltrated in the skin and subcutaneous tissue in the area of planned incision and medial to the ASIS for regional block. Incision was made sharply and carried down to the subcutaneous tissue. Electrocautery was used to maintain excellent hemostasis. The external oblique fibers were identified and cleared of overlying tissue. The fibers were opened to the external ring.The fibers were elevated off the underlying cord structures and internal obliques. The cord was isolated at the pubic tuburcle and a  Penrose drain was positioned to manipulate the cord structures. The cord was freed to the level of the internal ring. The external spermatic fibers were split. The hernia sac was identified and dissected free to the level of the internal ring, protecting the vas deferens. The sac was opened and no incarcerated contents were noted. The sac was returned to the abdominal cavity. A small mesh plug was positioned and secured with Vicryl suture to maintain the reduction. Mesh was cut to appropriate size. Mesh was secured laterally and inferiorly using interrupted Vicryl sutures. The mesh was split to allow passage of the cord structures. The tails of the mesh were secured lateral to the cord structures recreating the internal ring. There was adequate space for the cord.The mesh was inspected and found to be secure. The external obliques were then closed using running Vicryl suture with care taken to protect the cord from injury. Further local anesthetic was infiltrated for post operative pain control. The deep tissues were approximated using Vicryl suture. Skin edges were approximated using Monocryl suture. Sterile dressing was applied. Both testicles were noted to be in the scrotum with appropriate mobility.   The patient was then awakened from anesthesia and taken to postanesthesia recovery in stable condition. All needle, sponge and instrument counts were reported as correct at the conclusion of the case. The patient tolerated the procedure with no immediately apparent complications.

## 2019-01-09 NOTE — DISCHARGE INSTRUCTIONS
Procedure you had done: Hernia repair  Your health care provider is:  Guero Turner  Your surgeon is Dr. Aurora Verdin.   Please call your health care provider or surgeon at [phone] if:    - you feel you are getting worse or having an increase in problems    - fever greater than 101 degrees  - increasing shortness of breath or chest pain  - any signs of infection (increasing redness, swelling, tenderness, warmth, change in appearance, or  increased drainage)  - nausea (upset stomach) and vomiting and/or diarrhea that will not stop  - severe pain that is not relieved by medicine, rest or ice    Home care following hernia repair:  You will be discharged when you are safe to go home. Anesthesia can change judgment, reaction time and coordination for several hours after you seem back to normal. Therefore, do not operate any motorized vehicles or power tools for 24 hours after discharge.     Activity:  You should rest or do quiet activities for the rest of the day. The day after surgery you may be as active as you feel able. You may find that you require more rest than usual the first 3-4 days as your body heals. You should not lift more than 10 pounds for 1 week after surgery.      Diet:  Eat small amounts frequently after arriving home. Avoid foods that are hard to digest such as heavy, sweet, spicy, or fried foods until you are feeling well.   Vomiting can occur after general anesthesia. If vomiting lasts more than 5-7 times after arriving home, or if you have other difficulties, call your doctor.     Other:  1. Problems urinating can happen after surgery.  Please call your physician if you have any problems.  2. Some people have constipation after surgery-you can use over the counter stool softener or laxative as needed.  3. You can use ice on the area of the incision to help with pain and swelling. Apply an ice pack wrapped in a towel to the area for 10-15 minutes once an hour.  4. It may help to  decrease pain if you wear briefs rather than boxer underwear.     Dressing:  Your incision was covered with Steri-strips and a bandage. The bandage can be removed and you can shower 24 hours or more after the surgery. After you shower dry your incision gently. You may apply a clean bandage if you want to. The Steri-strips will stay on for 5-7 days.   No soaking in a bath, hot tub, pool or lake for 5 days.      Drainage:   Bleeding or drainage should be minimal.  1. If bleeding soaks the dressings, cover with another sterile dressing.  2. If bleeding continues, apply gentle steady pressure over the incision for 5 minutes.  3. If bleeding persists or there is an increased swelling of the area, call your surgeon or go to the Emergency Room.

## 2019-01-09 NOTE — INTERVAL H&P NOTE
History and Physical Update    The history and physical has been reviewed and the patient has been examined.  There are no changes to the patient's history or physical condition.  I discussed right inguinal hernia repair with mesh with the patient. Informed consent paperwork was completed. Patient was marked.

## 2019-01-09 NOTE — ANESTHESIA CARE TRANSFER NOTE
Patient: Sandro Yoon    Procedure(s):  Repair Right Inguinal Hernia w/ Mesh    Diagnosis: Right Inguinal Hernia  Diagnosis Additional Information: No value filed.    Anesthesia Type:   MAC     Note:  Airway :Nasal Cannula  Patient transferred to:Phase II  Handoff Report: Identifed the Patient, Identified the Reponsible Provider, Reviewed the pertinent medical history, Discussed the surgical course, Reviewed Intra-OP anesthesia mangement and issues during anesthesia, Set expectations for post-procedure period and Allowed opportunity for questions and acknowledgement of understanding      Vitals: (Last set prior to Anesthesia Care Transfer)    CRNA VITALS  1/9/2019 0825 - 1/9/2019 0901      1/9/2019             Resp Rate (set):  10                Electronically Signed By: SUMAYA Abel CRNA  January 9, 2019  9:01 AM

## 2019-01-09 NOTE — ANESTHESIA PREPROCEDURE EVALUATION
Anesthesia Pre-Procedure Evaluation    Patient: Sandro Yoon   MRN: [id number] : 1945          Preoperative Diagnosis: Right Inguinal Hernia    Procedure(s):  Repair Right Inguinal Hernia w/ Mesh    Past Medical History:   Diagnosis Date     Dizziness and giddiness     ,episode     Other skin changes     perianal area     Right inguinal hernia 2018     Transient global amnesia     hx of     Past Surgical History:   Procedure Laterality Date     APPENDECTOMY OPEN      No Comments Provided     COLONOSCOPY      ,Due 2019     EXTRACAPSULAR CATARACT EXTRATION WITH INTRAOCULAR LENS IMPLANT      2007,left sided, cataract extraction and intraocular lens     EXTRACAPSULAR CATARACT EXTRATION WITH INTRAOCULAR LENS IMPLANT      2007,right sided, cataract  extraction and intraocular lens     HEART CATH, ANGIOPLASTY      ,stenting, mid left anterior descending lesion     LAPAROSCOPIC CHOLECYSTECTOMY      ,with cholangiogram     OTHER SURGICAL HISTORY      ,515165,OTHER,90% LAD & 99% SECOND DIAGONAL     OTHER SURGICAL HISTORY      [id number],OTHER,patient states sensitive to ANESTHESIA     OTHER SURGICAL HISTORY      11/17/15,[id number],TRANSURETHRAL RESECTION OF BLADDER TUMOR,Dr. Mcginnis - [facility]     SIGMOIDOSCOPY FLEXIBLE           TONSILLECTOMY      No Comments Provided       Anesthesia Evaluation     . Pt has had prior anesthetic.     No history of anesthetic complications          ROS/MED HX    ENT/Pulmonary:  - neg pulmonary ROS     Neurologic:  - neg neurologic ROS     Cardiovascular: Comment: Stent placed in August. No s/s of chest pain or sob since    (+) hypertension--CAD, --. : . . . :. .       METS/Exercise Tolerance:     Hematologic:  - neg hematologic  ROS       Musculoskeletal:  - neg musculoskeletal ROS       GI/Hepatic: Comment: Occasional symptoms of reflux. Takes TUMS prn    (+) GERD Other,       Renal/Genitourinary:  - ROS Renal section negative       Endo:     (+) thyroid problem  "hypothyroidism, .      Psychiatric:  - neg psychiatric ROS       Infectious Disease:  - neg infectious disease ROS       Malignancy:      - no malignancy   Other:    - neg other ROS                      Physical Exam  Normal systems: cardiovascular, pulmonary and dental    Airway   Mallampati: II  TM distance: >3 FB  Neck ROM: full    Dental     Cardiovascular   Rhythm and rate: regular and normal      Pulmonary    breath sounds clear to auscultation            Lab Results   Component Value Date    WBC 6.4 08/10/2018    HGB 15.4 08/10/2018    HCT 47.1 08/10/2018     08/10/2018     08/10/2018    POTASSIUM 4.1 08/10/2018    CHLORIDE 106 08/10/2018    CO2 29 08/10/2018    BUN 19 08/10/2018    CR 1.31 (H) 08/10/2018    GLC 90 08/10/2018    JESSICA 9.7 08/10/2018    ALBUMIN 4.5 06/17/2013    PROTTOTAL 7.5 06/17/2013       Preop Vitals  BP Readings from Last 3 Encounters:   01/09/19 123/89   12/21/18 132/80   10/17/18 118/82    Pulse Readings from Last 3 Encounters:   10/17/18 84   08/30/18 80   08/21/18 72      Resp Readings from Last 3 Encounters:   01/09/19 16   10/17/18 18   06/13/16 18    SpO2 Readings from Last 3 Encounters:   01/09/19 94%   08/21/18 99%   07/24/18 96%      Temp Readings from Last 1 Encounters:   01/09/19 96.3  F (35.7  C) (Tympanic)    Ht Readings from Last 1 Encounters:   12/21/18 1.727 m (5' 8\")      Wt Readings from Last 1 Encounters:   01/09/19 71.2 kg (157 lb)    Estimated body mass index is 23.87 kg/m  as calculated from the following:    Height as of 12/21/18: 1.727 m (5' 8\").    Weight as of this encounter: 71.2 kg (157 lb).       Anesthesia Plan      History & Physical Review      ASA Status:  3 .    NPO Status:  > 8 hours    Plan for MAC          Postoperative Care      Consents  Anesthetic plan, risks, benefits and alternatives discussed with:  Patient..                 SUMAYA Cortez CRNA  "

## 2019-01-09 NOTE — ANESTHESIA POSTPROCEDURE EVALUATION
Patient: Sandro Yoon    Procedure(s):  Repair Right Inguinal Hernia w/ Mesh    Diagnosis:Right Inguinal Hernia  Diagnosis Additional Information: No value filed.    Anesthesia Type:  MAC    Note:  Anesthesia Post Evaluation    Patient location during evaluation: Phase 2  Patient participation: Able to fully participate in evaluation  Level of consciousness: awake and alert  Pain management: adequate  Airway patency: patent  Cardiovascular status: blood pressure returned to baseline, acceptable and hemodynamically stable  Respiratory status: acceptable  Hydration status: acceptable  PONV: none     Anesthetic complications: None          Last vitals:  Vitals:    01/09/19 0713 01/09/19 0900 01/09/19 0915   BP: 123/89 108/71 100/81   Pulse:  89 86   Resp: 16 16    Temp: 96.3  F (35.7  C) 96.8  F (36  C)    SpO2: 94% 96% 98%         Electronically Signed By: SUMAYA Cortez CRNA  January 9, 2019  10:58 AM

## 2019-01-10 ENCOUNTER — OFFICE VISIT (OUTPATIENT)
Dept: SURGERY | Facility: OTHER | Age: 74
End: 2019-01-10
Attending: SURGERY
Payer: MEDICARE

## 2019-01-10 ENCOUNTER — TELEPHONE (OUTPATIENT)
Dept: SURGERY | Facility: OTHER | Age: 74
End: 2019-01-10

## 2019-01-10 VITALS
TEMPERATURE: 98.6 F | WEIGHT: 162 LBS | SYSTOLIC BLOOD PRESSURE: 104 MMHG | HEART RATE: 84 BPM | BODY MASS INDEX: 24.63 KG/M2 | RESPIRATION RATE: 16 BRPM | DIASTOLIC BLOOD PRESSURE: 70 MMHG

## 2019-01-10 DIAGNOSIS — K40.90 RIGHT INGUINAL HERNIA: ICD-10-CM

## 2019-01-10 DIAGNOSIS — Z09 FOLLOW-UP EXAMINATION AFTER GASTROINTESTINAL SURGERY: Primary | ICD-10-CM

## 2019-01-10 DIAGNOSIS — Z79.01 ANTICOAGULANT LONG-TERM USE: ICD-10-CM

## 2019-01-10 PROCEDURE — 99024 POSTOP FOLLOW-UP VISIT: CPT | Performed by: SURGERY

## 2019-01-10 PROCEDURE — G0463 HOSPITAL OUTPT CLINIC VISIT: HCPCS

## 2019-01-10 RX ORDER — DOCUSATE SODIUM 100 MG/1
100 CAPSULE, LIQUID FILLED ORAL 2 TIMES DAILY PRN
COMMUNITY
End: 2020-09-21

## 2019-01-10 RX ORDER — OXYCODONE AND ACETAMINOPHEN 5; 325 MG/1; MG/1
0.5 TABLET ORAL EVERY 6 HOURS PRN
COMMUNITY
End: 2019-03-08

## 2019-01-10 ASSESSMENT — PAIN SCALES - GENERAL: PAINLEVEL: NO PAIN (0)

## 2019-01-10 NOTE — NURSING NOTE
"Chief Complaint   Patient presents with     Surgical Followup     right inguinal hernia repair - dos- 1/9/18 - steri strips came off and he is bleeding from site       Initial /70 (BP Location: Right arm, Patient Position: Sitting, Cuff Size: Adult Regular)   Pulse 84   Temp 98.6  F (37  C) (Tympanic)   Resp 16   Wt 73.5 kg (162 lb)   BMI 24.63 kg/m   Estimated body mass index is 24.63 kg/m  as calculated from the following:    Height as of 12/21/18: 1.727 m (5' 8\").    Weight as of this encounter: 73.5 kg (162 lb).  Medication Reconciliation: complete    Kalli Hunter LPN    "

## 2019-01-10 NOTE — TELEPHONE ENCOUNTER
States that one of the steri-strips came off and the area is bleeding.  Please have Dr. Verdin address this.  Lamar Mckay LPN..........1/10/2019  8:43 AM

## 2019-01-14 NOTE — PROGRESS NOTES
Patient presents for post surgical visit after right inguinal hernia repair on 1/9. Patient has concerns with a bit of bleeding at his incision. There was some stringy clot there. He is sore but not having severe pain. No fever. No scrotal swelling or bruising.    /70 (BP Location: Right arm, Patient Position: Sitting, Cuff Size: Adult Regular)   Pulse 84   Temp 98.6  F (37  C) (Tympanic)   Resp 16   Wt 73.5 kg (162 lb)   BMI 24.63 kg/m    General: NAD, pleasant and cooperative with exam and interview.  Abdomen: incision with no active bleeding.Skin edges well approximated. Small volume clot wiped off and new steri strip applied. Sterile dressing applied. No sign of infection. Appropriate pain with palpation.  Psychiatry: awake, alert and oriented. Appropriate affect.    Assessment/Plan:  Discussed surgery and expectations for recovery. Discussed superficial bleeding and anticoagulation needs Patient will keep scheduled appointment for further follow up. Patient will call with questions or concerns.

## 2019-01-22 ENCOUNTER — OFFICE VISIT (OUTPATIENT)
Dept: SURGERY | Facility: OTHER | Age: 74
End: 2019-01-22
Attending: SURGERY
Payer: MEDICARE

## 2019-01-22 VITALS
TEMPERATURE: 97 F | SYSTOLIC BLOOD PRESSURE: 110 MMHG | DIASTOLIC BLOOD PRESSURE: 68 MMHG | RESPIRATION RATE: 16 BRPM | HEART RATE: 76 BPM | WEIGHT: 160 LBS | BODY MASS INDEX: 24.33 KG/M2

## 2019-01-22 DIAGNOSIS — Z09 FOLLOW-UP EXAMINATION AFTER GASTROINTESTINAL SURGERY: Primary | ICD-10-CM

## 2019-01-22 PROCEDURE — G0463 HOSPITAL OUTPT CLINIC VISIT: HCPCS

## 2019-01-22 PROCEDURE — 99024 POSTOP FOLLOW-UP VISIT: CPT | Performed by: SURGERY

## 2019-01-22 ASSESSMENT — PAIN SCALES - GENERAL: PAINLEVEL: MILD PAIN (3)

## 2019-01-22 NOTE — NURSING NOTE
"Chief Complaint   Patient presents with     Surgical Followup     s/p right inguinal hernia repair - dos- 1/9/19       Initial /68 (BP Location: Right arm, Patient Position: Sitting, Cuff Size: Adult Regular)   Pulse 76   Temp 97  F (36.1  C) (Tympanic)   Resp 16   Wt 72.6 kg (160 lb)   BMI 24.33 kg/m   Estimated body mass index is 24.33 kg/m  as calculated from the following:    Height as of 12/21/18: 1.727 m (5' 8\").    Weight as of this encounter: 72.6 kg (160 lb).  Medication Reconciliation: complete    Kalli Hunter LPN    "

## 2019-01-28 PROBLEM — K40.90 RIGHT INGUINAL HERNIA: Status: RESOLVED | Noted: 2018-12-21 | Resolved: 2019-01-28

## 2019-01-28 NOTE — PROGRESS NOTES
Patient presents for post surgical visit after right inguinal hernia repair with mesh on 1/9/19. Patient has done well. No problems with incision since the day after surgery. He has been eating and drinking ok. Bowels are back to normal. No urinary difficulty. Using ice, heat and over the counter medications for pain.    /68 (BP Location: Right arm, Patient Position: Sitting, Cuff Size: Adult Regular)   Pulse 76   Temp 97  F (36.1  C) (Tympanic)   Resp 16   Wt 72.6 kg (160 lb)   BMI 24.33 kg/m    Gener al: NAD, pleasant and cooperative with exam and interview.  Abdomen: healing incision. No sign of infection. No pain with palpation.  Psychiatry: awake, alert and oriented. Appropriate affect.    Assessment/Plan:  Discussed surgery and expected recovery. Patient can return to normal activities. Patient will call with questions or concerns.

## 2019-02-12 ENCOUNTER — TRANSFERRED RECORDS (OUTPATIENT)
Dept: HEALTH INFORMATION MANAGEMENT | Facility: OTHER | Age: 74
End: 2019-02-12

## 2019-02-14 ENCOUNTER — MYC MEDICAL ADVICE (OUTPATIENT)
Dept: PEDIATRICS | Facility: OTHER | Age: 74
End: 2019-02-14

## 2019-02-14 NOTE — TELEPHONE ENCOUNTER
Usually with gastroenteritis vomiting is present for a couple days, with diarrhea for a week or so.  If symptoms worsen he should be seen again.    Signed, Guero Turner MD  [facility]

## 2019-02-16 ENCOUNTER — TRANSFERRED RECORDS (OUTPATIENT)
Dept: HEALTH INFORMATION MANAGEMENT | Facility: OTHER | Age: 74
End: 2019-02-16

## 2019-02-20 ENCOUNTER — MYC MEDICAL ADVICE (OUTPATIENT)
Dept: PEDIATRICS | Facility: OTHER | Age: 74
End: 2019-02-20

## 2019-02-21 NOTE — TELEPHONE ENCOUNTER
I think as long as he is mentating clearly, he can stop the lactulose.  There is no interaction between Brilinta and lactulose.  Schedule follow-up visit with me.    Signed, Guero Turner MD  [facility]

## 2019-02-22 ENCOUNTER — TRANSFERRED RECORDS (OUTPATIENT)
Dept: HEALTH INFORMATION MANAGEMENT | Facility: OTHER | Age: 74
End: 2019-02-22

## 2019-03-05 ENCOUNTER — MYC MEDICAL ADVICE (OUTPATIENT)
Dept: PEDIATRICS | Facility: OTHER | Age: 74
End: 2019-03-05

## 2019-03-05 NOTE — TELEPHONE ENCOUNTER
I'd rather review the documentation and order lab work, if needed, at the visit.    Signed, Guero Turner MD  [facility]

## 2019-03-06 ENCOUNTER — OFFICE VISIT (OUTPATIENT)
Dept: FAMILY MEDICINE | Facility: OTHER | Age: 74
End: 2019-03-06
Attending: PHYSICIAN ASSISTANT
Payer: MEDICARE

## 2019-03-06 ENCOUNTER — HOSPITAL ENCOUNTER (OUTPATIENT)
Dept: GENERAL RADIOLOGY | Facility: OTHER | Age: 74
Discharge: HOME | End: 2019-03-06
Attending: NURSE PRACTITIONER | Admitting: NURSE PRACTITIONER
Payer: MEDICARE

## 2019-03-06 VITALS
HEART RATE: 90 BPM | OXYGEN SATURATION: 95 % | SYSTOLIC BLOOD PRESSURE: 100 MMHG | WEIGHT: 143.8 LBS | TEMPERATURE: 98.1 F | RESPIRATION RATE: 14 BRPM | BODY MASS INDEX: 21.86 KG/M2 | DIASTOLIC BLOOD PRESSURE: 68 MMHG

## 2019-03-06 DIAGNOSIS — R05.3 PERSISTENT COUGH: ICD-10-CM

## 2019-03-06 DIAGNOSIS — R09.89 CHEST CONGESTION: ICD-10-CM

## 2019-03-06 DIAGNOSIS — R05.8 POST-VIRAL COUGH SYNDROME: Primary | ICD-10-CM

## 2019-03-06 PROCEDURE — G0463 HOSPITAL OUTPT CLINIC VISIT: HCPCS | Mod: 25

## 2019-03-06 PROCEDURE — G0463 HOSPITAL OUTPT CLINIC VISIT: HCPCS

## 2019-03-06 PROCEDURE — 99214 OFFICE O/P EST MOD 30 MIN: CPT | Performed by: NURSE PRACTITIONER

## 2019-03-06 PROCEDURE — 71046 X-RAY EXAM CHEST 2 VIEWS: CPT

## 2019-03-06 RX ORDER — BENZONATATE 100 MG/1
100 CAPSULE ORAL 3 TIMES DAILY PRN
Qty: 42 CAPSULE | Refills: 0 | Status: SHIPPED | OUTPATIENT
Start: 2019-03-06 | End: 2019-09-10

## 2019-03-06 SDOH — HEALTH STABILITY: MENTAL HEALTH: HOW OFTEN DO YOU HAVE A DRINK CONTAINING ALCOHOL?: NEVER

## 2019-03-06 ASSESSMENT — PAIN SCALES - GENERAL: PAINLEVEL: NO PAIN (1)

## 2019-03-06 NOTE — NURSING NOTE
Chief Complaint   Patient presents with     Cough     Medication Reconciliation: complete     Patient is here today for a cough and for a low temp of 93.7 this AM. Patient states he was feeling really foggy and right ear plugged.   He has been having bloody noses. On 2/22/19 patient was dx with influenza A. He has lost 15 lbs since the 10th of February.       Johana Yeung, CMA

## 2019-03-06 NOTE — PROGRESS NOTES
HPI:    Sandro Yoon is a 74 year old male  who presents to clinic today for concern for low temperature reading at home of 93.    Diagnosed with influenza A on 2/22 with symptoms starting about 3 days prior, he was not treated with any antiviral.  He had a flu shot.  His symptoms included cough, low grade fevers.    Cough has persisted.  Cough has become less frequent.  Cough was productive during influenza.  Cough is congested.  Rarely able to produce any phlegm.  No chest tightness.  Some congestion in upper chest.  No wheezing.  Chest sore from coughing.  Ribs sore from coughing.  Some times feels winded with standing up and activity.  Still very fatigued. Sleeping for about 2 to 3 hours at a time at night.  Napping quite a bit during the day as well.  Recovering from gastritis requiring hospitalization.  No appetite.  Nothing tastes good.  Eating some home made chicken noodle soup and drinking some chocolate milk.   Stools are starting to form up.    Right ear feels plugged and like talking in a barrel.  Nose bleeds the past 5 days since returning to [place] from [place], alternates nostrils.  General ache in shoulders.  No headaches.  No longer taking any Tylenol as he had an accidental over dose due to interaction with co-Q 10.  No fevers.  Occasional chills.  Patient is on Ticagrelor for anticoagulation.    He has an appt with his PCP in 2 days.        Past Medical History:   Diagnosis Date     Dizziness and giddiness     2001,episode     Other skin changes     perianal area     Right inguinal hernia 12/21/2018     Transient global amnesia     hx of     Past Surgical History:   Procedure Laterality Date     APPENDECTOMY OPEN      No Comments Provided     COLONOSCOPY  04/20/2009 2009,Due 2019     EXTRACAPSULAR CATARACT EXTRATION WITH INTRAOCULAR LENS IMPLANT      2007,left sided, cataract extraction and intraocular lens     EXTRACAPSULAR CATARACT EXTRATION WITH INTRAOCULAR LENS IMPLANT      2007,right sided,  cataract  extraction and intraocular lens     HEART CATH, ANGIOPLASTY      2003,stenting, mid left anterior descending lesion     HERNIORRHAPHY INGUINAL Right 1/9/2019    Procedure: Repair Right Inguinal Hernia w/ Mesh;  Surgeon: Aurora Verdin MD;  Location: [facility]     LAPAROSCOPIC CHOLECYSTECTOMY      2007,with cholangiogram     OTHER SURGICAL HISTORY      2005,952725,OTHER,90% LAD & 99% SECOND DIAGONAL     OTHER SURGICAL HISTORY      895624,OTHER,patient states sensitive to ANESTHESIA     OTHER SURGICAL HISTORY      11/17/15,[id number],TRANSURETHRAL RESECTION OF BLADDER TUMOR,Dr. Ras LUNA     SIGMOIDOSCOPY FLEXIBLE      1998     TONSILLECTOMY      No Comments Provided     Social History     Tobacco Use     Smoking status: Never Smoker     Smokeless tobacco: Never Used   Substance Use Topics     Alcohol use: No     Frequency: Never     Current Outpatient Medications   Medication Sig Dispense Refill     fluorouracil (EFUDEX) 5 % cream Apply 1 Application topically At Bedtime APPLY ONCE DAILY AT BEDTIME FOR 4 WEEKS. WASH OFF IN THE MORNING       fluticasone (FLONASE) 50 MCG/ACT spray Spray 2 sprays in nostril daily Inhale 2 Sprays into both nostrils once daily.       levothyroxine (SYNTHROID/LEVOTHROID) 100 MCG tablet Take 1 tablet (100 mcg) by mouth every morning (before breakfast) Take 1 tablet by mouth before breakfast. 90 tablet 3     metoprolol succinate (TOPROL-XL) 50 MG 24 hr tablet Take 1 tablet (50 mg) by mouth daily Take 1 tablet by mouth once daily. 90 tablet 3     nitroGLYcerin (NITROSTAT) 0.4 MG sublingual tablet For chest pain place 1 tablet under the tongue every 5 minutes for 3 doses. If symptoms persist 5 minutes after 1st dose call 911. 25 tablet 3     ramipril (ALTACE) 2.5 MG capsule Take 1 capsule (2.5 mg) by mouth daily Take 1 capsule by mouth once daily. 90 capsule 3     saline nasal (AYR SALINE) GEL topical gel Apply into each nare 4 times daily as needed for congestion       triamcinolone  (KENALOG) 0.1 % ointment Apply 1 Application topically 3 times daily Apply  topically to affected area(s) 3 times daily.       acetaminophen (TYLENOL) 500 MG tablet Take 500 mg by mouth every 6 hours as needed Take 1 tablet by mouth every 6 hours if needed. Max acetaminophen dose: 4000mg in 24 hrs       aspirin 81 MG chewable tablet Take 81 mg by mouth daily with food Take 1 tablet by mouth once daily with a meal.       atorvastatin (LIPITOR) 40 MG tablet Take 1 tablet (40 mg) by mouth daily 90 tablet 3     BRILINTA 90 MG tablet Take 90 mg by mouth 2 times daily  2     co-enzyme Q-10 100 MG CAPS capsule Take by mouth daily       docusate sodium (COLACE) 100 MG capsule Take 100 mg by mouth 2 times daily as needed for constipation       oxyCODONE-acetaminophen (PERCOCET) 5-325 MG tablet Take 0.5 tablets by mouth every 6 hours as needed for severe pain       Allergies   Allergen Reactions     Hydrocodone-Acetaminophen Nausea and Vomiting     Simvastatin Rash     Thimerosal Swelling     Diatrizoate Rash     Plavix [Clopidogrel] Rash         Past medical history, past surgical history, current medications and allergies reviewed and accurate to the best of my knowledge.        ROS:  Refer to HPI    /68   Pulse 90   Temp 98.1  F (36.7  C) (Tympanic)   Resp 14   Wt 65.2 kg (143 lb 12.8 oz)   SpO2 95%   BMI 21.86 kg/m      EXAM:  General Appearance: Well appearing elderly male, non toxic appearance, mildly fatigued, appropriate appearance for age. No acute distress  Head: normocephalic, atraumatic  Ears: Left TM grey, translucent with bony landmarks appreciated, no erythema, minimal serous effusion with mild bulging, no purulence.  Right TM grey, translucent with bony landmarks appreciated, no erythema, minimal serous effusion with mild bulging, no purulence.  Left auditory canal clear.  Right auditory canal clear.  Normal external ears, non tender.  Eyes: conjunctivae normal without erythema or irritation, no  drainage or crusting, no eyelid swelling, pupils equal   Orophayrnx: voice clear   Sinuses:  No sinus tenderness upon palpation of the frontal or maxillary sinuses  Nose:  Bilateral nares: no erythema, no edema, no abrasions or excoriation, no drainage or congestion, no bleeding,  noted.  Neck: supple without adenopathy  Respiratory: normal chest wall and respirations.  Normal effort.  Clear to auscultation bilaterally, no wheezing, crackles or rhonchi.  No increased work of breathing.  Audible upper anterior chest congestion with coughing.  Course congested cough appreciated, oxygen saturation 95%  Cardiac: RRR with no murmurs  Musculoskeletal:  Normal gait.  Equal movement of bilateral upper extremities.  Equal movement of bilateral lower extremities.    Psychological: flat affect, alert and cooperative      Xray:  PROCEDURE:  XR CHEST 2 VW    HISTORY:  Persistent cough; Chest congestion.     COMPARISON:  None.    FINDINGS:   The cardiac silhouette is normal in size. The pulmonary vasculature is  normal.  The lungs are clear. No pleural effusion or pneumothorax.      Impression     IMPRESSION:  No acute cardiopulmonary disease.      DIEGO FUENTES MD           ASSESSMENT/PLAN:  1. Persistent cough  2. Chest congestion    - XR Chest 2 Views; Future    CXR completed and reviewed, no infiltrate appreciated, radiologist over read:  No acute cardiopulmonary disease.      3. Post-viral cough syndrome    Discussed with patient viral vs bacterial respiratory illness, and evidence based practice and guidelines for cough without fever or infiltrate on xray are not indicative of pneumonia and should not be treated with antibiotics.    Discussed with patient that symptoms and exam are consistent with viral illness.  Discussed that symptomatic treatment of cough is appropriate but not with antibiotics.      Discussed diagnosis of post viral cough and need to treat inflammatory cough cycle.    Offered Tessalon Pearles, Mucinex,  and Albuterol inhaler.  Patient wants to try Tessalon and Mucinex.  Patient declines Albuterol inhaler at this time.    - benzonatate (TESSALON) 100 MG capsule; Take 1 capsule (100 mg) by mouth 3 times daily as needed for cough  Dispense: 42 capsule; Refill: 0    Symptomatic treatment of dry nose and nose bleeds - saline nasal spray, humidifier, Vaseline to nares, Afrin nasal spray, etc    Symptomatic treatment of cough - elevation, humidifier, lozenges, fluids - water, tea, broth, soup, etc     Encouraged fluids and bland diet as tolerated.    Discussed warning signs/symptoms indicative of need to f/u    Follow up with PCP in 2 days (3/8/19) as scheduled

## 2019-03-06 NOTE — PATIENT INSTRUCTIONS
Chest xray - no infiltrate or pneumonia    Tessalon pearles for cough suppression    May also consider trying Mucinex and Albuterol inhaler     Follow up with Dr. Turner on Friday as scheduled

## 2019-03-07 ENCOUNTER — MYC MEDICAL ADVICE (OUTPATIENT)
Dept: PEDIATRICS | Facility: OTHER | Age: 74
End: 2019-03-07

## 2019-03-07 ENCOUNTER — DOCUMENTATION ONLY (OUTPATIENT)
Dept: OTHER | Facility: CLINIC | Age: 74
End: 2019-03-07

## 2019-03-08 ENCOUNTER — OFFICE VISIT (OUTPATIENT)
Dept: PEDIATRICS | Facility: OTHER | Age: 74
End: 2019-03-08
Attending: INTERNAL MEDICINE
Payer: COMMERCIAL

## 2019-03-08 VITALS
TEMPERATURE: 97.8 F | RESPIRATION RATE: 16 BRPM | DIASTOLIC BLOOD PRESSURE: 68 MMHG | WEIGHT: 143.6 LBS | SYSTOLIC BLOOD PRESSURE: 114 MMHG | BODY MASS INDEX: 21.76 KG/M2 | HEART RATE: 82 BPM | HEIGHT: 68 IN

## 2019-03-08 DIAGNOSIS — Z09 HOSPITAL DISCHARGE FOLLOW-UP: Primary | ICD-10-CM

## 2019-03-08 DIAGNOSIS — K52.9 GASTROENTERITIS: ICD-10-CM

## 2019-03-08 DIAGNOSIS — T39.1X1D: ICD-10-CM

## 2019-03-08 DIAGNOSIS — T50.905D ADVERSE EFFECT OF DRUG, SUBSEQUENT ENCOUNTER: ICD-10-CM

## 2019-03-08 DIAGNOSIS — J10.1 INFLUENZA A: ICD-10-CM

## 2019-03-08 PROCEDURE — 99214 OFFICE O/P EST MOD 30 MIN: CPT | Performed by: INTERNAL MEDICINE

## 2019-03-08 PROCEDURE — G0463 HOSPITAL OUTPT CLINIC VISIT: HCPCS

## 2019-03-08 ASSESSMENT — PATIENT HEALTH QUESTIONNAIRE - PHQ9: SUM OF ALL RESPONSES TO PHQ QUESTIONS 1-9: 0

## 2019-03-08 ASSESSMENT — PAIN SCALES - GENERAL: PAINLEVEL: MODERATE PAIN (5)

## 2019-03-08 ASSESSMENT — MIFFLIN-ST. JEOR: SCORE: 1360.87

## 2019-03-08 NOTE — PATIENT INSTRUCTIONS
-- Eat yogurt   -- Probiotics   -- Recheck LFTs at 1 month after hospital say   -- Stop CoQ10   -- Keep acetaminophen under 3000 mg/day   -- Return if symptoms return

## 2019-03-08 NOTE — PROGRESS NOTES
Subjective  Sandro Yoon is a 74 year old male who presents for Hospital Discharge Follow-up.  He was admitted to the hospital in [place] while on vacation.  Initially he went to an urgent care because he vomited green.  He was given Zofran and went home.  Symptoms persisted and so he went to the emergency department because of abdominal pain.  He was diagnosed with gastroenteritis and biliary obstruction and was admitted.  Discharge summary was reviewed today, see media tab.  He developed elevated transaminases and ammonia and was thought to have acetaminophen toxicity because of a combination of coenzyme Q10 and acetaminophen.  He tells me he never took any more than 5 x 500 mg acetaminophen per day and most days it was less than that.  He was treated with Mucomyst and lactulose and had normalization of patient and lab work.  He also was diagnosed with infectious colitis and improved with Flagyl and Levaquin.  Unfortunately after discharging to his home in [place] he developed a cough and return for repeat evaluation.  He was diagnosed with influenza A.  Since he is been coughing for over 4 days he declined Tamiflu.  He still coughing up some phlegm but he feels like he is slowly improving.  He wants to know what level of exercise he can accomplish at this point in time.    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    08/07:    Eight minutes Nick protocol stress echo normal.    Never smoked. No coffee.  One soda daily.  Rare alcohol.    Likes to cross country ski and bike. .     I reviewed social history and made relevant updates  "today.    Family Hx:   Family History   Problem Relation Age of Onset     Heart Disease Mother         Heart Disease,CAD     Heart Disease Father         Heart Disease,CAD     Prostate Cancer Father 80        Cancer-prostate     Heart Disease Brother         Heart Disease,3 of 4 have CAD     Prostate Cancer Brother 40        Cancer-prostate     Other - See Comments Brother         CABG sdc young age     Prostate Cancer Brother 65        Cancer-prostate     Other - See Comments Brother         no heart disease     Other - See Comments Brother         CABG sdc young age 40s     Colon Cancer No family hx of         Cancer-colon     Anesthesia Reaction No family hx of         Anesthesia Problem     Blood Disease No family hx of         Blood Disease,no bleeding or clotting       Objective  Vitals: reviewed in EMR.  /68 (BP Location: Right arm, Patient Position: Sitting, Cuff Size: Adult Large)   Pulse 82   Temp 97.8  F (36.6  C) (Tympanic)   Resp 16   Ht 1.727 m (5' 8\")   Wt 65.1 kg (143 lb 9.6 oz)   BMI 21.83 kg/m      Gen: Pleasant male, NAD.  HEENT: MMM, no OP erythema.   Neck: Supple,no JVD, no bruits.  CV: RRR no m/r/g.   Pulm: CTAB no w/r/r  Neuro: Grossly intact  Msk: No lower extremity edema.  Skin: No concerning lesions.  Psychiatric: Normal affect and insight. Does not appear anxious or depressed.    Outside lab work reviewed via paper, see media tab  Elevated transaminases, ammonia    Assessment    ICD-10-CM    1. Hospital discharge follow-up Z09 Comprehensive metabolic panel     CBC with platelets   2. Gastroenteritis K52.9 Comprehensive metabolic panel     CBC with platelets   3. Influenza A J10.1 Comprehensive metabolic panel     CBC with platelets   4. Tylenol overdose, accidental or unintentional, subsequent encounter T39.1X1D    5. Adverse effect of drug, subsequent encounter - Co-Q10 T50.905D        Mr. Yoon was recently hospitalized for hepatic encephalopathy secondary to co-ingestion " of coenzyme Q10 and acetaminophen, infectious gastroenteritis, influenza a, appears to be doing well since discharge.  Discharge summary and recommendations for outpatient provider are reviewed. Based on what occurred in the visit today:  Previous medication(s) were discontinued or altered? No  Previous medication(s) were suspended pending consultation? No  New medication(s) started? No    I would like to have his LFTs rechecked but they may still be slightly elevated.  We expect that as he continues to feel clinically better blood work would normalize as well.  We plan to recheck his LFTs about a month after the hospital stay.  I think it is okay for him to continue acetaminophen but recommend discontinuing coenzyme Q10.  He also plans to discuss this with his cardiologist.    Plan   -- Expected clinical course discussed   -- Medications and their side effects discussed    Patient Instructions    -- Eat yogurt   -- Probiotics   -- Recheck LFTs at 1 month after hospital say   -- Stop CoQ10   -- Keep acetaminophen under 3000 mg/day   -- Return if symptoms return              Signed, Guero Turner MD  [facility]

## 2019-03-08 NOTE — NURSING NOTE
Patient presents to clinic for hosp F/U for gastroenteritis.  Has been in the ED and hospital over the last month.  Leesa Squires LPN ....................  3/8/2019   2:09 PM    No LMP for male patient.  Medication Reconciliation: complete    Leesa Squires LPN  3/8/2019 2:10 PM

## 2019-03-22 DIAGNOSIS — J10.1 INFLUENZA A: ICD-10-CM

## 2019-03-22 DIAGNOSIS — Z09 HOSPITAL DISCHARGE FOLLOW-UP: ICD-10-CM

## 2019-03-22 DIAGNOSIS — K52.9 GASTROENTERITIS: ICD-10-CM

## 2019-03-22 LAB
ALBUMIN SERPL-MCNC: 3.9 G/DL (ref 3.5–5.7)
ALP SERPL-CCNC: 1070 U/L (ref 34–104)
ALT SERPL W P-5'-P-CCNC: 217 U/L (ref 7–52)
ANION GAP SERPL CALCULATED.3IONS-SCNC: 9 MMOL/L (ref 3–14)
AST SERPL W P-5'-P-CCNC: 163 U/L (ref 13–39)
BILIRUB SERPL-MCNC: 1.8 MG/DL (ref 0.3–1)
BUN SERPL-MCNC: 24 MG/DL (ref 7–25)
CALCIUM SERPL-MCNC: 9.7 MG/DL (ref 8.6–10.3)
CHLORIDE SERPL-SCNC: 105 MMOL/L (ref 98–107)
CO2 SERPL-SCNC: 26 MMOL/L (ref 21–31)
CREAT SERPL-MCNC: 1.16 MG/DL (ref 0.7–1.3)
ERYTHROCYTE [DISTWIDTH] IN BLOOD BY AUTOMATED COUNT: 14.2 % (ref 10–15)
GFR SERPL CREATININE-BSD FRML MDRD: 61 ML/MIN/{1.73_M2}
GLUCOSE SERPL-MCNC: 104 MG/DL (ref 70–105)
HCT VFR BLD AUTO: 44.4 % (ref 40–53)
HGB BLD-MCNC: 14 G/DL (ref 13.3–17.7)
MCH RBC QN AUTO: 32.3 PG (ref 26.5–33)
MCHC RBC AUTO-ENTMCNC: 31.5 G/DL (ref 31.5–36.5)
MCV RBC AUTO: 103 FL (ref 78–100)
PLATELET # BLD AUTO: 303 10E9/L (ref 150–450)
POTASSIUM SERPL-SCNC: 3.9 MMOL/L (ref 3.5–5.1)
PROT SERPL-MCNC: 6.9 G/DL (ref 6.4–8.9)
RBC # BLD AUTO: 4.33 10E12/L (ref 4.4–5.9)
SODIUM SERPL-SCNC: 140 MMOL/L (ref 134–144)
WBC # BLD AUTO: 8.1 10E9/L (ref 4–11)

## 2019-03-22 PROCEDURE — 85027 COMPLETE CBC AUTOMATED: CPT | Performed by: INTERNAL MEDICINE

## 2019-03-22 PROCEDURE — 80053 COMPREHEN METABOLIC PANEL: CPT | Performed by: INTERNAL MEDICINE

## 2019-03-22 PROCEDURE — 36415 COLL VENOUS BLD VENIPUNCTURE: CPT | Performed by: INTERNAL MEDICINE

## 2019-03-25 ENCOUNTER — TELEPHONE (OUTPATIENT)
Dept: PEDIATRICS | Facility: OTHER | Age: 74
End: 2019-03-25

## 2019-03-25 NOTE — TELEPHONE ENCOUNTER
[facility]-pt requesting work in appt for this week to discuss lab results per Anna Antony. Please call. Thank you.   Yamilex Paulino

## 2019-03-25 NOTE — TELEPHONE ENCOUNTER
Would you like me to add him somewhere in our schedule.  Leesa Squires LPN ....................  3/25/2019   9:52 AM

## 2019-03-26 ENCOUNTER — TELEPHONE (OUTPATIENT)
Dept: PEDIATRICS | Facility: OTHER | Age: 74
End: 2019-03-26

## 2019-03-26 DIAGNOSIS — R10.9 ABDOMINAL PAIN, UNSPECIFIED ABDOMINAL LOCATION: ICD-10-CM

## 2019-03-26 DIAGNOSIS — R74.8 ELEVATED ALKALINE PHOSPHATASE LEVEL: Primary | ICD-10-CM

## 2019-03-26 DIAGNOSIS — R74.01 ELEVATED TRANSAMINASE LEVEL: ICD-10-CM

## 2019-03-26 DIAGNOSIS — Z11.59 ENCOUNTER FOR SCREENING FOR OTHER VIRAL DISEASES: ICD-10-CM

## 2019-03-26 DIAGNOSIS — R74.8 ELEVATED ALKALINE PHOSPHATASE LEVEL: ICD-10-CM

## 2019-03-26 DIAGNOSIS — R74.01 ELEVATED TRANSAMINASE LEVEL: Primary | ICD-10-CM

## 2019-03-26 LAB
ALBUMIN SERPL-MCNC: 3.7 G/DL (ref 3.5–5.7)
ALP SERPL-CCNC: 951 U/L (ref 34–104)
ALT SERPL W P-5'-P-CCNC: 171 U/L (ref 7–52)
ANION GAP SERPL CALCULATED.3IONS-SCNC: 11 MMOL/L (ref 3–14)
AST SERPL W P-5'-P-CCNC: 118 U/L (ref 13–39)
BASOPHILS # BLD AUTO: 0 10E9/L (ref 0–0.2)
BASOPHILS NFR BLD AUTO: 0.3 %
BILIRUB SERPL-MCNC: 1.8 MG/DL (ref 0.3–1)
BUN SERPL-MCNC: 21 MG/DL (ref 7–25)
CALCIUM SERPL-MCNC: 9.8 MG/DL (ref 8.6–10.3)
CHLORIDE SERPL-SCNC: 105 MMOL/L (ref 98–107)
CO2 SERPL-SCNC: 25 MMOL/L (ref 21–31)
CREAT SERPL-MCNC: 1.1 MG/DL (ref 0.7–1.3)
CRP SERPL-MCNC: 0.6 MG/L
DIFFERENTIAL METHOD BLD: ABNORMAL
EOSINOPHIL # BLD AUTO: 0.3 10E9/L (ref 0–0.7)
EOSINOPHIL NFR BLD AUTO: 3.5 %
ERYTHROCYTE [DISTWIDTH] IN BLOOD BY AUTOMATED COUNT: 14.3 % (ref 10–15)
ERYTHROCYTE [SEDIMENTATION RATE] IN BLOOD BY WESTERGREN METHOD: 63 MM/H (ref 1–10)
GFR SERPL CREATININE-BSD FRML MDRD: 65 ML/MIN/{1.73_M2}
GGT SERPL-CCNC: 1640 U/L (ref 9–64)
GLUCOSE SERPL-MCNC: 124 MG/DL (ref 70–105)
HCT VFR BLD AUTO: 43.3 % (ref 40–53)
HGB BLD-MCNC: 13.6 G/DL (ref 13.3–17.7)
IMM GRANULOCYTES # BLD: 0 10E9/L (ref 0–0.4)
IMM GRANULOCYTES NFR BLD: 0.3 %
LYMPHOCYTES # BLD AUTO: 1.5 10E9/L (ref 0.8–5.3)
LYMPHOCYTES NFR BLD AUTO: 21.1 %
MCH RBC QN AUTO: 32.3 PG (ref 26.5–33)
MCHC RBC AUTO-ENTMCNC: 31.4 G/DL (ref 31.5–36.5)
MCV RBC AUTO: 103 FL (ref 78–100)
MONOCYTES # BLD AUTO: 0.8 10E9/L (ref 0–1.3)
MONOCYTES NFR BLD AUTO: 11.3 %
NEUTROPHILS # BLD AUTO: 4.5 10E9/L (ref 1.6–8.3)
NEUTROPHILS NFR BLD AUTO: 63.5 %
PLATELET # BLD AUTO: 307 10E9/L (ref 150–450)
POTASSIUM SERPL-SCNC: 3.7 MMOL/L (ref 3.5–5.1)
PROT SERPL-MCNC: 7.1 G/DL (ref 6.4–8.9)
PROTHROMBIN TIME: 12.1 S (ref 11.9–15.2)
RBC # BLD AUTO: 4.21 10E12/L (ref 4.4–5.9)
SODIUM SERPL-SCNC: 141 MMOL/L (ref 134–144)
WBC # BLD AUTO: 7.1 10E9/L (ref 4–11)

## 2019-03-26 PROCEDURE — 80053 COMPREHEN METABOLIC PANEL: CPT | Performed by: INTERNAL MEDICINE

## 2019-03-26 PROCEDURE — 86140 C-REACTIVE PROTEIN: CPT | Performed by: INTERNAL MEDICINE

## 2019-03-26 PROCEDURE — 86706 HEP B SURFACE ANTIBODY: CPT | Performed by: INTERNAL MEDICINE

## 2019-03-26 PROCEDURE — 82977 ASSAY OF GGT: CPT | Performed by: INTERNAL MEDICINE

## 2019-03-26 PROCEDURE — 85652 RBC SED RATE AUTOMATED: CPT | Performed by: INTERNAL MEDICINE

## 2019-03-26 PROCEDURE — 86803 HEPATITIS C AB TEST: CPT | Performed by: INTERNAL MEDICINE

## 2019-03-26 PROCEDURE — 86709 HEPATITIS A IGM ANTIBODY: CPT | Performed by: INTERNAL MEDICINE

## 2019-03-26 PROCEDURE — 84080 ASSAY ALKALINE PHOSPHATASES: CPT | Performed by: INTERNAL MEDICINE

## 2019-03-26 PROCEDURE — 85610 PROTHROMBIN TIME: CPT | Performed by: INTERNAL MEDICINE

## 2019-03-26 PROCEDURE — 86704 HEP B CORE ANTIBODY TOTAL: CPT | Performed by: INTERNAL MEDICINE

## 2019-03-26 PROCEDURE — 36415 COLL VENOUS BLD VENIPUNCTURE: CPT | Performed by: INTERNAL MEDICINE

## 2019-03-26 PROCEDURE — 85025 COMPLETE CBC W/AUTO DIFF WBC: CPT | Performed by: INTERNAL MEDICINE

## 2019-03-26 PROCEDURE — 87340 HEPATITIS B SURFACE AG IA: CPT | Performed by: INTERNAL MEDICINE

## 2019-03-26 NOTE — TELEPHONE ENCOUNTER
Please call Mr. Yoon.  I'd like him to repeat lab work today.  Depending on the trend we may need to repeat imaging testing eg US or CT.      ICD-10-CM    1. Elevated alkaline phosphatase level R74.8 CBC with platelets differential     CRP inflammation     Erythrocyte sedimentation rate auto     Comprehensive metabolic panel     Hepatitis A antibody IgM     Hepatitis B core antibody     Hepatitis B Surface Antibody     Hepatitis B surface antigen     Hepatitis C antibody     GAMMA GT (GGT)     Bone specific alk phosphatase     Protime-INR   2. Elevated transaminase level R74.0 CBC with platelets differential     CRP inflammation     Erythrocyte sedimentation rate auto     Comprehensive metabolic panel     Hepatitis A antibody IgM     Hepatitis B core antibody     Hepatitis B Surface Antibody     Hepatitis B surface antigen     Hepatitis C antibody     GAMMA GT (GGT)     Bone specific alk phosphatase     Protime-INR   3. Encounter for screening for other viral diseases  Z11.59 Hepatitis B core antibody     Hepatitis B Surface Antibody     Hepatitis B surface antigen   4. Abdominal pain, unspecified abdominal location  R10.9 Protime-INR      No orders of the defined types were placed in this encounter.    Orders Placed This Encounter   Procedures     CBC with platelets differential     CRP inflammation     Erythrocyte sedimentation rate auto     Comprehensive metabolic panel     Hepatitis A antibody IgM     Hepatitis B core antibody     Hepatitis B Surface Antibody     Hepatitis B surface antigen     Hepatitis C antibody     GAMMA GT (GGT)     Bone specific alk phosphatase     Protime-INR     Signed, Guero Turner MD  [facility]

## 2019-03-26 NOTE — TELEPHONE ENCOUNTER
Patient notified and will be in this am for lab draw.  Leesa Squires LPN ....................  3/26/2019   8:05 AM

## 2019-03-26 NOTE — TELEPHONE ENCOUNTER
Labs remain elevated.        ICD-10-CM    1. Elevated transaminase level R74.0 US Abdomen Limited   2. Elevated alkaline phosphatase level R74.8 US Abdomen Limited      No orders of the defined types were placed in this encounter.    Orders Placed This Encounter   Procedures     US Abdomen Limited      -- Liver US with doppler today   -- If concerning symptoms, present to ED    Signed, Guero Turner MD  [facility]

## 2019-03-26 NOTE — TELEPHONE ENCOUNTER
Called patient and verified last name and date of birth. Informed him of elevated lab levels and sent to radiology scheduling.     Informed him to go to ER if worsening symptoms.    Christianne Dsouza LPN on 3/26/2019 at 10:57 AM

## 2019-03-27 ENCOUNTER — TELEPHONE (OUTPATIENT)
Dept: FAMILY MEDICINE | Facility: OTHER | Age: 74
End: 2019-03-27

## 2019-03-27 ENCOUNTER — HOSPITAL ENCOUNTER (OUTPATIENT)
Dept: ULTRASOUND IMAGING | Facility: OTHER | Age: 74
Discharge: HOME | End: 2019-03-27
Attending: INTERNAL MEDICINE | Admitting: INTERNAL MEDICINE
Payer: MEDICARE

## 2019-03-27 DIAGNOSIS — R74.8 ELEVATED ALKALINE PHOSPHATASE LEVEL: ICD-10-CM

## 2019-03-27 DIAGNOSIS — R74.01 ELEVATED TRANSAMINASE LEVEL: ICD-10-CM

## 2019-03-27 LAB
HAV IGM SERPL QL IA: NONREACTIVE
HBV CORE AB SERPL QL IA: NONREACTIVE
HBV SURFACE AB SERPL IA-ACNC: 1.89 M[IU]/ML
HBV SURFACE AG SERPL QL IA: NONREACTIVE
HCV AB SERPL QL IA: NONREACTIVE

## 2019-03-27 PROCEDURE — 76700 US EXAM ABDOM COMPLETE: CPT

## 2019-03-27 NOTE — TELEPHONE ENCOUNTER
Patient was expecting to be seen today after US for results. he was in agreement to go home and someone would call with results. Please call and let patient know that his ultrasound was normal and did not show any blockage or concerns.  Nuvia Bailey CNP on 3/27/2019 at 4:45 PM

## 2019-03-28 ENCOUNTER — TELEPHONE (OUTPATIENT)
Dept: PEDIATRICS | Facility: OTHER | Age: 74
End: 2019-03-28

## 2019-03-28 ENCOUNTER — OFFICE VISIT (OUTPATIENT)
Dept: PEDIATRICS | Facility: OTHER | Age: 74
End: 2019-03-28
Attending: INTERNAL MEDICINE
Payer: MEDICARE

## 2019-03-28 VITALS
WEIGHT: 144 LBS | HEIGHT: 68 IN | SYSTOLIC BLOOD PRESSURE: 104 MMHG | TEMPERATURE: 96.7 F | BODY MASS INDEX: 21.82 KG/M2 | DIASTOLIC BLOOD PRESSURE: 68 MMHG | HEART RATE: 78 BPM | RESPIRATION RATE: 14 BRPM

## 2019-03-28 DIAGNOSIS — R74.8 ELEVATED ALKALINE PHOSPHATASE LEVEL: Primary | ICD-10-CM

## 2019-03-28 DIAGNOSIS — K92.89 OTHER SPECIFIED DISEASES OF THE DIGESTIVE SYSTEM: ICD-10-CM

## 2019-03-28 DIAGNOSIS — R70.0 ELEVATED SEDIMENTATION RATE: ICD-10-CM

## 2019-03-28 DIAGNOSIS — R74.01 ELEVATED TRANSAMINASE LEVEL: ICD-10-CM

## 2019-03-28 DIAGNOSIS — R27.8 OTHER LACK OF COORDINATION: ICD-10-CM

## 2019-03-28 DIAGNOSIS — R71.8 OTHER ABNORMALITY OF RED BLOOD CELLS: ICD-10-CM

## 2019-03-28 LAB — ALP BONE SERPL-MCNC: 107.5 UG/L (ref 6.5–20.1)

## 2019-03-28 PROCEDURE — 82784 ASSAY IGA/IGD/IGG/IGM EACH: CPT | Performed by: INTERNAL MEDICINE

## 2019-03-28 PROCEDURE — G0463 HOSPITAL OUTPT CLINIC VISIT: HCPCS

## 2019-03-28 PROCEDURE — 82103 ALPHA-1-ANTITRYPSIN TOTAL: CPT | Performed by: INTERNAL MEDICINE

## 2019-03-28 PROCEDURE — 86038 ANTINUCLEAR ANTIBODIES: CPT | Performed by: INTERNAL MEDICINE

## 2019-03-28 PROCEDURE — 99214 OFFICE O/P EST MOD 30 MIN: CPT | Performed by: INTERNAL MEDICINE

## 2019-03-28 PROCEDURE — 82104 ALPHA-1-ANTITRYPSIN PHENO: CPT | Performed by: INTERNAL MEDICINE

## 2019-03-28 PROCEDURE — 82390 ASSAY OF CERULOPLASMIN: CPT | Performed by: INTERNAL MEDICINE

## 2019-03-28 PROCEDURE — 82525 ASSAY OF COPPER: CPT | Performed by: INTERNAL MEDICINE

## 2019-03-28 PROCEDURE — 86039 ANTINUCLEAR ANTIBODIES (ANA): CPT | Performed by: INTERNAL MEDICINE

## 2019-03-28 PROCEDURE — 82164 ANGIOTENSIN I ENZYME TEST: CPT | Performed by: INTERNAL MEDICINE

## 2019-03-28 PROCEDURE — 40000968 ZZHCL STATISTIC ZIKA VIRUS: Performed by: INTERNAL MEDICINE

## 2019-03-28 PROCEDURE — 82175 ASSAY OF ARSENIC: CPT | Performed by: INTERNAL MEDICINE

## 2019-03-28 PROCEDURE — 83655 ASSAY OF LEAD: CPT | Performed by: INTERNAL MEDICINE

## 2019-03-28 PROCEDURE — 36415 COLL VENOUS BLD VENIPUNCTURE: CPT | Performed by: INTERNAL MEDICINE

## 2019-03-28 PROCEDURE — 83825 ASSAY OF MERCURY: CPT | Performed by: INTERNAL MEDICINE

## 2019-03-28 ASSESSMENT — PATIENT HEALTH QUESTIONNAIRE - PHQ9: SUM OF ALL RESPONSES TO PHQ QUESTIONS 1-9: 2

## 2019-03-28 ASSESSMENT — PAIN SCALES - GENERAL: PAINLEVEL: MILD PAIN (3)

## 2019-03-28 ASSESSMENT — MIFFLIN-ST. JEOR: SCORE: 1362.68

## 2019-03-28 NOTE — PATIENT INSTRUCTIONS
-- Gastroenterology consult   -- Consider getting Hep A and B vaccine series   -- Stop statin   -- Don't restart acetaminophen

## 2019-03-28 NOTE — TELEPHONE ENCOUNTER
Have Mr. Yoon come in today for a visit to discuss results.    Signed, Guero Turner MD  [facility]

## 2019-03-28 NOTE — TELEPHONE ENCOUNTER
Spoke to patient.  Patient states that he can come in today to discuss results.  Assisted patient in setting up time.    Patricia Rosario LPN 3/28/2019   7:52 AM

## 2019-03-28 NOTE — NURSING NOTE
Patient presents to clinic to go over his U/S results.  Leesa Squires LPN ....................  3/28/2019   10:20 AM    No LMP for male patient.  Medication Reconciliation: complete    Leesa Squires LPN  3/28/2019 10:20 AM

## 2019-03-29 ENCOUNTER — APPOINTMENT (OUTPATIENT)
Dept: LAB | Facility: OTHER | Age: 74
End: 2019-03-29
Attending: INTERNAL MEDICINE
Payer: MEDICARE

## 2019-03-29 LAB
ANA PAT SER IF-IMP: ABNORMAL
ANA SER QL IF: ABNORMAL
ANA TITR SER IF: ABNORMAL {TITER}
IGG SERPL-MCNC: 831 MG/DL (ref 695–1620)

## 2019-03-29 NOTE — PROGRESS NOTES
Subjective  Sandro Yoon is a 74 year old male who presents for follow-up test results.  He is been feeling a little bit lightheaded and a slight discomfort in the left upper quadrant as well as on the bilateral low right sides of the abdomen.  His sensation of smell is reduced.  Overall he feels like he is improving since his hospital stay in [place].  He has not vomited recently.  He did have biliary emesis back when he was sick.    Problem List/PMH: reviewed in EMR, and made relevant updates today.  Medications: reviewed in EMR, and made relevant updates today.  Allergies: reviewed in EMR, and made relevant updates today.    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    08/07:    Eight minutes Nick protocol stress echo normal.    Never smoked. No coffee.  One soda daily.  Rare alcohol.    Likes to cross country ski and bike. .     I reviewed social history and made relevant updates today.    Family Hx:   Family History   Problem Relation Age of Onset     Heart Disease Mother         Heart Disease,CAD     Heart Disease Father         Heart Disease,CAD     Prostate Cancer Father 80        Cancer-prostate     Heart Disease Brother         Heart Disease,3 of 4 have CAD     Prostate Cancer Brother 40        Cancer-prostate     Other - See Comments Brother         CABG [facility] young age     Prostate Cancer Brother 65        Cancer-prostate     Other - See Comments Brother         no heart disease     Other - See Comments Brother         CABG [facility] young age 40s     Colon Cancer No family hx of         Cancer-colon     Anesthesia Reaction No family hx of         Anesthesia Problem     Blood Disease No family hx of         Blood Disease,no  "bleeding or clotting       Objective  Vitals: reviewed in EMR.  /68 (BP Location: Right arm, Patient Position: Sitting, Cuff Size: Adult Large)   Pulse 78   Temp 96.7  F (35.9  C) (Tympanic)   Resp 14   Ht 1.727 m (5' 8\")   Wt 65.3 kg (144 lb)   BMI 21.90 kg/m      Gen: Pleasant male, NAD.  HEENT: MMM  Neck: Supple  CV: RRR no m/r/g.   Pulm: CTAB no w/r/r  GI: Soft, NT, ND. No HSM. No masses. Bowel sounds present.  Neuro: Grossly intact  Msk: No lower extremity edema.  Skin: No concerning lesions.  Psychiatric: Normal affect and insight. Does not appear anxious or depressed.    Component      Latest Ref Rng & Units 3/22/2019 3/26/2019   WBC      4.0 - 11.0 10e9/L 8.1 7.1   RBC Count      4.4 - 5.9 10e12/L 4.33 (L) 4.21 (L)   Hemoglobin      13.3 - 17.7 g/dL 14.0 13.6   Hematocrit      40.0 - 53.0 % 44.4 43.3   MCV      78 - 100 fl 103 (H) 103 (H)   MCH      26.5 - 33.0 pg 32.3 32.3   MCHC      31.5 - 36.5 g/dL 31.5 31.4 (L)   RDW      10.0 - 15.0 % 14.2 14.3   Platelet Count      150 - 450 10e9/L 303 307   Diff Method        Automated Method   % Neutrophils      %  63.5   % Lymphocytes      %  21.1   % Monocytes      %  11.3   % Eosinophils      %  3.5   % Basophils      %  0.3   % Immature Granulocytes      %  0.3   Absolute Neutrophil      1.6 - 8.3 10e9/L  4.5   Absolute Lymphocytes      0.8 - 5.3 10e9/L  1.5   Absolute Monocytes      0.0 - 1.3 10e9/L  0.8   Absolute Eosinophils      0.0 - 0.7 10e9/L  0.3   Absolute Basophils      0.0 - 0.2 10e9/L  0.0   Abs Immature Granulocytes      0 - 0.4 10e9/L  0.0   Sodium      134 - 144 mmol/L 140 141   Potassium      3.5 - 5.1 mmol/L 3.9 3.7   Chloride      98 - 107 mmol/L 105 105   Carbon Dioxide      21 - 31 mmol/L 26 25   Anion Gap      3 - 14 mmol/L 9 11   Glucose      70 - 105 mg/dL 104 124 (H)   Urea Nitrogen      7 - 25 mg/dL 24 21   Creatinine      0.70 - 1.30 mg/dL 1.16 1.10   GFR Estimate      >60 mL/min/1.73:m2 61 65   GFR Estimate If Black      " >60 mL/min/1.73:m2 74 79   Calcium      8.6 - 10.3 mg/dL 9.7 9.8   Bilirubin Total      0.3 - 1.0 mg/dL 1.8 (H) 1.8 (H)   Albumin      3.5 - 5.7 g/dL 3.9 3.7   Protein Total      6.4 - 8.9 g/dL 6.9 7.1   Alkaline Phosphatase      34 - 104 U/L 1,070 (H) 951 (H)   ALT      7 - 52 U/L 217 (H) 171 (H)   AST      13 - 39 U/L 163 (H) 118 (H)   Protime      11.9 - 15.2 s  12.1   Bone Spec Alk Phosphatase      6.5 - 20.1 ug/L  107.5 (H)   GGT      9 - 64 U/L  1,640 (H)   Hepatitis C Antibody      NR:Nonreactive  Nonreactive   Hep B Surface Agn      NR:Nonreactive  Nonreactive   Hepatitis B Surface Antibody      <8.00 m[IU]/mL  1.89   Hepatitis B Core Maty      NR:Nonreactive  Nonreactive   Hepatitis A IgM Maty      NR:Nonreactive  Nonreactive   Sed Rate      1 - 10 mm/h  63 (H)   CRP Inflammation      <0.5 mg/L  0.6 (H)       Recent Results (from the past 744 hour(s))   XR Chest 2 Views    Narrative    PROCEDURE:  XR CHEST 2 VW    HISTORY:  Persistent cough; Chest congestion.     COMPARISON:  None.    FINDINGS:   The cardiac silhouette is normal in size. The pulmonary vasculature is  normal.  The lungs are clear. No pleural effusion or pneumothorax.      Impression    IMPRESSION:  No acute cardiopulmonary disease.      DIEGO FUENTES MD   US Abdomen Complete w Doppler Complete    Narrative    PROCEDURE: US ABDOMEN COMPLETE WITH DOPPLER COMPLETE    HISTORY: prolonged elevated transaminases, despite removal of  offending agent. Consider autoimmune, vascular, etc.; Elevated  transaminase level; Elevated alkaline phosphatase level    TECHNIQUE:  Grayscale ultrasound of the liver was performed with color  flow and spectral Doppler evaluation.    COMPARISON: CT abdomen pelvis 11/9/2015      GRAYSCALE MEASUREMENTS:  Liver length:  11.7 cm.  CD diameter:  0.3 cm.  Splenic length:  9.9 cm.     GRAYSCALE LIVER FINDINGS:   The liver is normal in size and echogenicity. The echotexture is  smooth. There is no intrahepatic mass or biliary  ductal dilatation.  There is hepatopetal flow in the main portal vein. The surface of the  liver is smooth.    HEPATIC COLOR FLOW AND SPECTRAL DOPPLER FINDINGS:   There is hepatopetal flow in the main portal vein as well as the right  and left portal veins. Arterial flow is seen in the main artery with  sharp systolic upstrokes and good diastolic flow present. Calculated  resistive indices are within normal limits. Phasic flow is seen in the  right, middle, and left hepatic veins as well as the inferior vena  cava.     HEPATIC SPECTRAL DOPPLER MEASUREMENTS:    Main portal vein velocity: 16 cm/sec.  Right portal vein velocity: 28 cm/sec.  Left portal vein velocity: 39 cm/sec.    Main hepatic artery peak systolic velocity: 89 cm/sec.  Main hepatic artery RI: 0.77    GALLBLADDER: Surgically absent.    ABDOMINAL AORTA AND IVC: Portions of the superior IVC and abdominal  aorta are visualized.    PANCREAS:The visualized portions of the pancreas are unremarkable.    SPLEEN: Numerous splenic granulomata are redemonstrated. The splenic  size is normal.    KIDNEYS: Survey sagittal images show no collecting system dilatation.    OTHER: There is no free fluid in the upper abdomen      Impression    IMPRESSION:     No evidence of portal vein, hepatic vein or hepatic arterial occlusion  or severe stenosis to account for elevated transaminases.    No evidence of hepatic steatosis or cirrhosis. No ascites.    MIA EDWARDS MD         Assessment    ICD-10-CM    1. Elevated alkaline phosphatase level R74.8 GASTROENTEROLOGY ADULT REF CONSULT ONLY     Copper urine     Alpha 1 Antitrypsin     Ceruloplasmin     Anti Nuclear Maty IgG by IFA with Reflex     Angiotensin converting enzyme     Zika Virus     IgG     IgG     Angiotensin converting enzyme     Anti Nuclear Maty IgG by IFA with Reflex     Ceruloplasmin     Alpha 1 Antitrypsin   2. Elevated transaminase level R74.0 GASTROENTEROLOGY ADULT REF CONSULT ONLY     Copper urine      Alpha 1 Antitrypsin     Ceruloplasmin     Anti Nuclear Maty IgG by IFA with Reflex     Angiotensin converting enzyme     Zika Virus     IgG     IgG     Angiotensin converting enzyme     Anti Nuclear Maty IgG by IFA with Reflex     Ceruloplasmin     Alpha 1 Antitrypsin   3. Elevated sedimentation rate R70.0 GASTROENTEROLOGY ADULT REF CONSULT ONLY     Copper urine     Alpha 1 Antitrypsin     Ceruloplasmin     Anti Nuclear Maty IgG by IFA with Reflex     Angiotensin converting enzyme     Zika Virus     IgG     IgG     Angiotensin converting enzyme     Anti Nuclear Maty IgG by IFA with Reflex     Ceruloplasmin     Alpha 1 Antitrypsin   4. Other specified diseases of the digestive system  K92.89 Blood metal panel     Blood metal panel   5. Other abnormality of red blood cells  R71.8 Blood metal panel     Blood metal panel   6. Other lack of coordination  R27.8 Blood metal panel     Blood metal panel     Orders Placed This Encounter   Procedures     Copper urine     Alpha 1 Antitrypsin     Blood metal panel     Ceruloplasmin     Anti Nuclear Maty IgG by IFA with Reflex     Angiotensin converting enzyme     Zika Virus     IgG     GASTROENTEROLOGY ADULT REF CONSULT ONLY       Concerning findings include persisting elevated transaminase levels, persistently elevated alkaline phosphatase level.  It is unlikely that the ongoing inflammatory process is connected to the previous suspected acetaminophen toxicity combined with coenzyme Q 10.  One would expect a significant improvement at a month out, which is not seen.  Inflammatory markers are elevated.  Differential diagnosis includes nonalcoholic steatohepatitis, viral hepatitis, autoimmune hepatitis, occult malignancy with bone/muscle involvement, biliary sclerosis, others.  We had a lengthy discussion today with regards to the laboratory, imaging findings and differential diagnosis.  Gastroenterology consultation is recommended as a next step and and dissipate further testing  including but not limited to liver biopsy may be obtained.    Plan   -- Expected clinical course discussed   -- Medications and their side effects discussed  Patient Instructions    -- Gastroenterology consult   -- Consider getting Hep A and B vaccine series   -- Stop statin   -- Don't restart acetaminophen      Return in about 6 weeks (around 5/9/2019) for after specialty consult.    Signed, Guero Turner MD  [facility]

## 2019-03-30 LAB — ACE SERPL-CCNC: <5 U/L (ref 9–67)

## 2019-04-01 ENCOUNTER — DOCUMENTATION ONLY (OUTPATIENT)
Dept: CARE COORDINATION | Facility: CLINIC | Age: 74
End: 2019-04-01

## 2019-04-01 LAB
A1AT PHENOTYP SERPL-IMP: NORMAL
A1AT SERPL-MCNC: 179 MG/DL (ref 90–200)
ARSENIC BLD-MCNC: <10 UG/L (ref 0–12)
CERULOPLASMIN SERPL-MCNC: 46 MG/DL (ref 23–53)
LEAD BLDV-MCNC: <2 UG/DL (ref 0–4.9)
MERCURY BLD-MCNC: <2.5 UG/L (ref 0–10)

## 2019-04-03 LAB
COLLECT DURATION TIME UR: 24 HR
COPPER 24H UR-MRATE: 22 UG/D (ref 3–45)
COPPER ?TM UR-MCNC: 2 UG/DL (ref 0.3–3.2)
COPPER/CREAT 24H UR: 18.7 UG/G CRT (ref 10–45)
CREAT 24H UR-MRATE: 1177 MG/D (ref 800–2100)
CREAT UR-MCNC: 107 MG/DL
SPECIMEN VOL ?TM UR: 1100 ML

## 2019-04-04 ENCOUNTER — HOSPITAL ENCOUNTER (OUTPATIENT)
Facility: AMBULATORY SURGERY CENTER | Age: 74
End: 2019-04-04
Attending: PHYSICIAN ASSISTANT
Payer: COMMERCIAL

## 2019-04-04 DIAGNOSIS — R94.5 ABNORMAL RESULTS OF LIVER FUNCTION STUDIES: Primary | ICD-10-CM

## 2019-04-04 LAB — LAB SCANNED RESULT: NORMAL

## 2019-04-04 NOTE — PROGRESS NOTES
Hepatology Clinic note  Sandro Yoon   Date of Birth 1945  Date of Service 4/4/2019    REASON FOR CONSULTATION: Elevated LFT's  REFERRING PROVIDER: Guero Turner MD          Assessment/plan:   Sandro Yoon is a 74 year old male with history hospitalization for abdominal pain, and cyclically elevated transaminases dominantly cholestatic with Alk Phos in the 900s and ALT/AST in the 150s. His total bilirubin has been intermittently elevated, peaked at 3.6.  His bilirubin has been primarily indirect suggesting that he has likely Gilbert's Syndrome. His liver function is otherwise normal.   His abdominal pain has since resolved and his appetite is good. He has already had a thorough lab workup which is ruled out hepatitis A, B, and C, alpha-1 antitrypsin trypsin deficiency and Jaylon's disease. CARRIE was weakly positive. He has no clinical or biochemical signs of advanced liver disease.     He has had recent imaging showing discrepancies in hepatobiliary anatomy. We will obtain further imaging/detail with MRI/MRCP at next availability to evaluate for recent stones, hepatobiliary disease or malignancy.  If MRCP is not conclusive, would recommend moving forward with a liver biopsy to rule out autoimmune hepatitis or drug induced liver injury (atorvastatin, acetaminophen or metoprolol) His statin was discontinued last week, this drug is unlikely.      Patient has a significant history of CAD and had stent placement x 2 in August 2018 and has been on Brillinta since. If liver biopsy is necessary, it is recommended to hold agent for 5 days prior to the procedure. Patient will call his Cardiologist to see if he is able to hold the medication if liver biopsy is needed.     - Check f-actin, iron panel, TTG, AMA, IGM   - Trend hepatic panel/INR twice weekly   - MRI/MRCP at next availability   - Liver biopsy tentatively scheduled for next week   - Will contact patient for return appointment based on work-up     Kaylen  "JUAN M Wise   [facility]     -----------------------------------------------------       HPI:   Sandro Yoon is a 74 year old male  presenting for the evaluation of elevated LFT's.     Patient states that he was vacationing in [place] the middle of February when he felt sick and had some abdominal pain.  He went to urgent care was given Zofran and told to follow-up for pain.  He went to the ER for worsening pain which he describes as \"waves of pain\" across bilateral flanks and lower abdomen. He was taking 2000 mg of Tylenol in addition to his other medications. He had an CT abdomen that showed enteritis, mild intra-and extrahepatic duct dilatation, punctate calcifications in liver and spleen.     His pain worsens so he returned to the ER and was admitted.  He received mucomyst for potential Tylenol overdose.  He had mildly elevated ammonia up to 41 and was started on lactulose.  He denies any overt confusion or problems with mentation.      Labs were as followed:  2/12 AST 37 Alk Phos 129 direct bilirubin 1.3 total bilirubin 3.6  2/16  Alk Phos 338 direct bili 0.9 total bili 3.0   2/17  Alk Phos 400 direct bili 0.5 total bili 1.6  2/18  Alk Phos 301 direct bilirubin 0.3 total bili 1.1  2/22  Alk Phos 342  total bili 0.9    He states he is lost about 17 pounds during the month.  His weight has now stabilized and he has a good appetite again. He denies being told that he has had previous problems with his liver.    Repeat labs 1 month later continue to be more elevated    3/22  alk phos 1070 total bilirubin 1.8   3/26  alk phos 951 T bili 1.8  4/5 AST 96 alk phos 891 T bili 1.5 INR 0.98    Had a recent upper respiratory illness.  She is taking Tessalon.    Patient denies jaundice, lower extremity edema, abdominal distension or confusion.  Patient also denies melena, hematochezia or hematemesis. Patient denies sweats or chills.    PMH: Coronary " artery disease, GERD, recent upper respiratory illness, seasonal allergies, hypertension, hypothyroidism    PSH: Hernia repair January 2019, history of coronary stent placed and in August 2018 currently on Brilinta for 1 year, tonsillectomy, appendectomy, laparoscopic cholecystectomy in 2005    Medications:   Aspirin  Tessalon-started a few weeks ago for cough  Coenzyme Q 10 stopped after recent hospitalization  Colace as needed  Pepcid as needed  Fluticasone  Lactobacillus bifida S  Levothyroxine  Loratadine  Metoprolol succinate-has been on for years  Nitroglycerin-has not used  Ramipril - has been on for years  Saline nasal gel  Triamcinolone ointment  Acetaminophen  Atorvastatin    No history of tobacco use.  Patient has not drank alcohol since July 2018.  He denies any history of significant alcohol use.  He denies any intravenous or intranasal drug use.  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  He  lives with his wife.  He denies any family history of liver disease or liver cancer.  He denies any autoimmune conditions in his family.    Hepatitis A IgM antibody nonreactive  Hepatitis B surface antigen nonreactive  Hepatitis B surface antibody 1.89- not immune  Hepatitis B core antibody nonreactive  Hepatitis C antibody nonreactive  CARRIE weakly +1: 40  f-actin: Not done  Ceruloplasmin 46 normal  IgG 831  Alpha-1 antitrypsin 179 normal  A1 antitrypsin phenotype, MZMZ  Bone specific alk phos 107.5 -elevated  Iron panel -     Medical hx Surgical hx   Past Medical History:   Diagnosis Date     Dizziness and giddiness      Other skin changes      Right inguinal hernia 12/21/2018     Transient global amnesia     Past Surgical History:   Procedure Laterality Date     APPENDECTOMY OPEN      No Comments Provided     COLONOSCOPY  04/20/2009 2009,Due 2019     EXTRACAPSULAR CATARACT EXTRATION WITH INTRAOCULAR LENS IMPLANT      2007,left sided, cataract extraction and intraocular lens     EXTRACAPSULAR  CATARACT EXTRATION WITH INTRAOCULAR LENS IMPLANT      2007,right sided, cataract  extraction and intraocular lens     HEART CATH, ANGIOPLASTY      2003,stenting, mid left anterior descending lesion     HERNIORRHAPHY INGUINAL Right 1/9/2019    Procedure: Repair Right Inguinal Hernia w/ Mesh;  Surgeon: Aurora Verdin MD;  Location: [facility] OR     LAPAROSCOPIC CHOLECYSTECTOMY      2007,with cholangiogram     OTHER SURGICAL HISTORY      2005,247742,OTHER,90% LAD & 99% SECOND DIAGONAL     OTHER SURGICAL HISTORY      404400,OTHER,patient states sensitive to ANESTHESIA     OTHER SURGICAL HISTORY      11/17/15,[id number],TRANSURETHRAL RESECTION OF BLADDER TUMOR,Dr. Rsa LUNA     SIGMOIDOSCOPY FLEXIBLE      1998     TONSILLECTOMY      No Comments Provided                 Medications:     Current Outpatient Medications   Medication     aspirin 81 MG chewable tablet     benzonatate (TESSALON) 100 MG capsule     BRILINTA 90 MG tablet     docusate sodium (COLACE) 100 MG capsule     famotidine (PEPCID) 10 MG tablet     fluticasone (FLONASE) 50 MCG/ACT spray     LACTOBACILLUS BIFIDUS PO     levothyroxine (SYNTHROID/LEVOTHROID) 100 MCG tablet     loratadine (CLARITIN) 10 MG tablet     metoprolol succinate (TOPROL-XL) 50 MG 24 hr tablet     nitroGLYcerin (NITROSTAT) 0.4 MG sublingual tablet     ramipril (ALTACE) 2.5 MG capsule     saline nasal (AYR SALINE) GEL topical gel     triamcinolone (KENALOG) 0.1 % ointment     acetaminophen (TYLENOL) 500 MG tablet     atorvastatin (LIPITOR) 40 MG tablet     fluorouracil (EFUDEX) 5 % cream     No current facility-administered medications for this visit.             Allergies:     Allergies   Allergen Reactions     Co Q 10 [Coenzyme Q10] Other (See Comments)     Encephalopathy, transaminitis.     Hydrocodone-Acetaminophen Nausea and Vomiting     Simvastatin Rash     Thimerosal Swelling     Diatrizoate Rash     Plavix [Clopidogrel] Rash            Social History:     Social History     Socioeconomic  History     Marital status:      Spouse name: Not on file     Number of children: Not on file     Years of education: Not on file     Highest education level: Not on file   Occupational History     Not on file   Social Needs     Financial resource strain: Not on file     Food insecurity:     Worry: Not on file     Inability: Not on file     Transportation needs:     Medical: Not on file     Non-medical: Not on file   Tobacco Use     Smoking status: Never Smoker     Smokeless tobacco: Never Used   Substance and Sexual Activity     Alcohol use: No     Frequency: Never     Drug use: No     Types: Other     Comment: Drug use: No     Sexual activity: Yes   Lifestyle     Physical activity:     Days per week: Not on file     Minutes per session: Not on file     Stress: Not on file   Relationships     Social connections:     Talks on phone: Not on file     Gets together: Not on file     Attends Hoahaoism service: Not on file     Active member of club or organization: Not on file     Attends meetings of clubs or organizations: Not on file     Relationship status: Not on file     Intimate partner violence:     Fear of current or ex partner: Not on file     Emotionally abused: Not on file     Physically abused: Not on file     Forced sexual activity: Not on file   Other Topics Concern     Parent/sibling w/ CABG, MI or angioplasty before 65F 55M? 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    08/07:    Eight minutes Nick protocol stress echo normal.    Never smoked. No coffee.  One soda daily.  Rare alcohol.    Likes to cross country ski and bike. .            Family History:     Family History   Problem Relation Age of Onset     Heart Disease Mother         Heart Disease,CAD     Heart Disease Father         Heart Disease,CAD     Prostate Cancer Father 80        Cancer-prostate     Heart Disease Brother         Heart Disease,3 of 4 have CAD     Prostate Cancer Brother 40         "Cancer-prostate     Other - See Comments Brother         CABG sdc young age     Prostate Cancer Brother 65        Cancer-prostate     Other - See Comments Brother         no heart disease     Other - See Comments Brother         CABG sdc young age 40s     Colon Cancer No family hx of         Cancer-colon     Anesthesia Reaction No family hx of         Anesthesia Problem     Blood Disease No family hx of         Blood Disease,no bleeding or clotting              Review of Systems:   Gen: See HPI     HEENT: No change in vision or hearing, mouth sores, dysphagia, lymph nodes  Resp: No shortness of breath, coughing, hx of asthma  CV: No chest pain, palpitations, syncope   GI: See HPI  : No dysuria, history of stones, urine color    Skin: No rash; no pruritus or psoriasis  MS: No arthralgias, myalgias, joint swelling  Neuro: No memory changes, confusion, numbness    Heme: No difficulty clotting, bruising, bleeding  Psych:  No anxiety, depression, agitation          Physical Exam:   VS:  /70   Pulse 89   Temp 97.7  F (36.5  C) (Oral)   Ht 1.727 m (5' 8\")   Wt 65.8 kg (145 lb)   SpO2 97%   BMI 22.05 kg/m        Gen: A&Ox3, NAD, well developed  HEENT: non-icteric   CV: RRR, no overt murmurs  Lung: CTA Bilatererally, no wheezing or crackles.   Lym- no palpable lymphadenopathy  Abd: soft, NT, ND, no palpable splenomegaly, liver is not palpable.  Ext: no edema, intact pulses.   Skin: No rash, no palmar erythema, telangiectasias or jaundice  Neuro: grossly intact, no asterixis   Psych: appropriate mood and affects         Data:   Reviewed in person and significant for:    Lab Results   Component Value Date     03/26/2019      Lab Results   Component Value Date    POTASSIUM 3.7 03/26/2019     Lab Results   Component Value Date    CHLORIDE 105 03/26/2019     Lab Results   Component Value Date    CO2 25 03/26/2019     Lab Results   Component Value Date    BUN 21 03/26/2019     Lab Results   Component Value Date "    CR 1.10 03/26/2019       Lab Results   Component Value Date    WBC 7.1 03/26/2019     Lab Results   Component Value Date    HGB 13.6 03/26/2019     Lab Results   Component Value Date    HCT 43.3 03/26/2019     Lab Results   Component Value Date     03/26/2019     Lab Results   Component Value Date     03/26/2019       Lab Results   Component Value Date     03/26/2019     Lab Results   Component Value Date     03/26/2019     No results found for: BILICONJ   Lab Results   Component Value Date    BILITOTAL 1.8 03/26/2019       Lab Results   Component Value Date    ALBUMIN 3.7 03/26/2019     Lab Results   Component Value Date    PROTTOTAL 7.1 03/26/2019      Lab Results   Component Value Date    ALKPHOS 951 03/26/2019       No results found for: INR        CT of the abdomen on 2/12/2019:    showing punctate calcification in the left aurora-liver compatible with old healed granulomatous disease.  Changes of prior cholecystectomy are seen.  Mild intra-and extrahepatic duct dilatation.  Punctate calcifications in the spleen are compatible with old healed granulomatous disease.    Contrast-enhanced CT abdomen and pelvis on 2/12/2018.    Gallbladder surgically absent, there is diffuse fatty infiltration of the liver without focal abnormality.  The pancreas is unremarkable.  Calcified granuloma are noted throughout the spleen.    Impression findings suggesting 1. enteritis 2.  diverticulosis 3 other findings as above    Ultrasound gallbladder on 2/16/2019:   Liver is within normal limits.  Changes of prior cholecystectomy.  Pancreatic duct measures 2.6 mm common bile duct measures 3.7 cm at the sampson hepatis and 6.4 mm at the pancreas    US ABDOMEN:   3/27/219:   No evidence of portal vein, hepatic vein or hepatic arterial occlusion  or severe stenosis to account for elevated transaminases.     No evidence of hepatic steatosis or cirrhosis. No ascites.

## 2019-04-05 ENCOUNTER — TELEPHONE (OUTPATIENT)
Dept: CARDIOLOGY | Facility: OTHER | Age: 74
End: 2019-04-05

## 2019-04-05 ENCOUNTER — APPOINTMENT (OUTPATIENT)
Dept: LAB | Facility: CLINIC | Age: 74
End: 2019-04-05
Payer: COMMERCIAL

## 2019-04-05 ENCOUNTER — OFFICE VISIT (OUTPATIENT)
Dept: GASTROENTEROLOGY | Facility: CLINIC | Age: 74
End: 2019-04-05
Attending: INTERNAL MEDICINE
Payer: MEDICARE

## 2019-04-05 VITALS
TEMPERATURE: 97.7 F | WEIGHT: 145 LBS | HEART RATE: 89 BPM | DIASTOLIC BLOOD PRESSURE: 70 MMHG | BODY MASS INDEX: 21.98 KG/M2 | HEIGHT: 68 IN | SYSTOLIC BLOOD PRESSURE: 109 MMHG | OXYGEN SATURATION: 97 %

## 2019-04-05 DIAGNOSIS — R79.89 ABNORMAL LFTS (LIVER FUNCTION TESTS): Primary | ICD-10-CM

## 2019-04-05 DIAGNOSIS — R74.01 ELEVATED TRANSAMINASE LEVEL: ICD-10-CM

## 2019-04-05 DIAGNOSIS — R70.0 ELEVATED SEDIMENTATION RATE: ICD-10-CM

## 2019-04-05 DIAGNOSIS — R74.8 ELEVATED ALKALINE PHOSPHATASE LEVEL: ICD-10-CM

## 2019-04-05 DIAGNOSIS — R79.89 ABNORMAL LFTS (LIVER FUNCTION TESTS): ICD-10-CM

## 2019-04-05 DIAGNOSIS — R94.5 ABNORMAL RESULTS OF LIVER FUNCTION STUDIES: ICD-10-CM

## 2019-04-05 LAB
ALBUMIN SERPL-MCNC: 3.2 G/DL (ref 3.4–5)
ALP SERPL-CCNC: 891 U/L (ref 40–150)
ALT SERPL W P-5'-P-CCNC: 164 U/L (ref 0–70)
ANION GAP SERPL CALCULATED.3IONS-SCNC: 7 MMOL/L (ref 3–14)
AST SERPL W P-5'-P-CCNC: 96 U/L (ref 0–45)
BILIRUB DIRECT SERPL-MCNC: 0.5 MG/DL (ref 0–0.2)
BILIRUB SERPL-MCNC: 1.5 MG/DL (ref 0.2–1.3)
BUN SERPL-MCNC: 19 MG/DL (ref 7–30)
CALCIUM SERPL-MCNC: 9.7 MG/DL (ref 8.5–10.1)
CHLORIDE SERPL-SCNC: 106 MMOL/L (ref 94–109)
CO2 SERPL-SCNC: 26 MMOL/L (ref 20–32)
CREAT SERPL-MCNC: 1.16 MG/DL (ref 0.66–1.25)
ERYTHROCYTE [DISTWIDTH] IN BLOOD BY AUTOMATED COUNT: 14.3 % (ref 10–15)
GFR SERPL CREATININE-BSD FRML MDRD: 61 ML/MIN/{1.73_M2}
GLUCOSE SERPL-MCNC: 117 MG/DL (ref 70–99)
HCT VFR BLD AUTO: 44.5 % (ref 40–53)
HGB BLD-MCNC: 13.7 G/DL (ref 13.3–17.7)
INR PPP: 0.98 (ref 0.86–1.14)
MCH RBC QN AUTO: 31.9 PG (ref 26.5–33)
MCHC RBC AUTO-ENTMCNC: 30.8 G/DL (ref 31.5–36.5)
MCV RBC AUTO: 104 FL (ref 78–100)
PLATELET # BLD AUTO: 324 10E9/L (ref 150–450)
POTASSIUM SERPL-SCNC: 3.9 MMOL/L (ref 3.4–5.3)
PROT SERPL-MCNC: 6.8 G/DL (ref 6.8–8.8)
RBC # BLD AUTO: 4.3 10E12/L (ref 4.4–5.9)
SODIUM SERPL-SCNC: 140 MMOL/L (ref 133–144)
WBC # BLD AUTO: 5.9 10E9/L (ref 4–11)

## 2019-04-05 PROCEDURE — 36415 COLL VENOUS BLD VENIPUNCTURE: CPT | Performed by: PHYSICIAN ASSISTANT

## 2019-04-05 PROCEDURE — 85027 COMPLETE CBC AUTOMATED: CPT | Performed by: PHYSICIAN ASSISTANT

## 2019-04-05 PROCEDURE — 80048 BASIC METABOLIC PNL TOTAL CA: CPT | Performed by: PHYSICIAN ASSISTANT

## 2019-04-05 PROCEDURE — 85610 PROTHROMBIN TIME: CPT | Performed by: PHYSICIAN ASSISTANT

## 2019-04-05 PROCEDURE — 80076 HEPATIC FUNCTION PANEL: CPT | Performed by: PHYSICIAN ASSISTANT

## 2019-04-05 PROCEDURE — G0463 HOSPITAL OUTPT CLINIC VISIT: HCPCS | Mod: ZF

## 2019-04-05 RX ORDER — FAMOTIDINE 10 MG
10 TABLET ORAL 2 TIMES DAILY PRN
COMMUNITY
End: 2022-05-05

## 2019-04-05 RX ORDER — LORATADINE 10 MG/1
10 TABLET ORAL DAILY PRN
COMMUNITY
End: 2024-05-31

## 2019-04-05 ASSESSMENT — MIFFLIN-ST. JEOR: SCORE: 1367.22

## 2019-04-05 ASSESSMENT — PAIN SCALES - GENERAL: PAINLEVEL: NO PAIN (0)

## 2019-04-05 NOTE — TELEPHONE ENCOUNTER
Tor Arguello, DO   You 5 minutes ago (1:05 PM)      He needs to stay on the Brilinta until 8/14/19.  Brilinta cannot be discontinued prior to 8/14/19 which would be 1 year from stent placement on 8/14/18.     DB    Routing Comment

## 2019-04-05 NOTE — TELEPHONE ENCOUNTER
Per [facility]:    He needs to stay on the Brilinta until 8/14/19.  Brilinta cannot be discontinued prior to 8/14/19 which would be 1 year from stent placement on 8/14/18.     DB     Call to pt to discuss. He is scheduled for liver biopsy on 04/11. MRI tomorrow 04/06. Pt states they will await results of mri before proceeding with biopsy and will contact this office if biopsy is emergent for alternative options. Otherwise, they will wait until August to proceed after Brilinta can be discontinued. Luz Maria Cooper RN on 4/5/2019 at 2:28 PM

## 2019-04-05 NOTE — TELEPHONE ENCOUNTER
Received phone message regarding Sandro Yoon to ask if he is able to hold his Brilinta 90 mg twice daily 5 days prior to procedure he is having. Per phone message Dr. Arguello sees patient in [place]. Return phone number is [phone]. Please advise. Thank you.   Patricia Masters RN

## 2019-04-05 NOTE — LETTER
4/5/2019      RE: Sandro Yoon  [address] [place] [address]       Hepatology Clinic note  Sandro Yoon   Date of Birth 1945  Date of Service 4/4/2019    REASON FOR CONSULTATION: Elevated LFT's  REFERRING PROVIDER: Guero Turner MD          Assessment/plan:   Sandro Yoon is a 74 year old male with history hospitalization for abdominal pain, and cyclically elevated transaminases dominantly cholestatic with Alk Phos in the 900s and ALT/AST in the 150s. His total bilirubin has been intermittently elevated, peaked at 3.6.  His bilirubin has been primarily indirect suggesting that he has likely Gilbert's Syndrome. His liver function is otherwise normal.   His abdominal pain has since resolved and his appetite is good. He has already had a thorough lab workup which is ruled out hepatitis A, B, and C, alpha-1 antitrypsin trypsin deficiency and Jaylon's disease. CARRIE was weakly positive. He has no clinical or biochemical signs of advanced liver disease.     He has had recent imaging showing discrepancies in hepatobiliary anatomy. We will obtain further imaging/detail with MRI/MRCP at next availability to evaluate for recent stones, hepatobiliary disease or malignancy.  If MRCP is not conclusive, would recommend moving forward with a liver biopsy to rule out autoimmune hepatitis or drug induced liver injury (atorvastatin, acetaminophen or metoprolol) His statin was discontinued last week, this drug is unlikely.      Patient has a significant history of CAD and had stent placement x 2 in August 2018 and has been on Brillinta since. If liver biopsy is necessary, it is recommended to hold agent for 5 days prior to the procedure. Patient will call his Cardiologist to see if he is able to hold the medication if liver biopsy is needed.     - Check f-actin, iron panel, TTG, AMA, IGM   - Trend hepatic panel/INR twice weekly   - MRI/MRCP at next availability   - Liver biopsy tentatively scheduled  "for next week   - Will contact patient for return appointment based on work-up     Kaylen Wise PA-C   [facility] Hepatology     -----------------------------------------------------       HPI:   Sandro Yoon is a 74 year old male  presenting for the evaluation of elevated LFT's.     Patient states that he was vacationing in [place] the middle of February when he felt sick and had some abdominal pain.  He went to urgent care was given Zofran and told to follow-up for pain.  He went to the ER for worsening pain which he describes as \"waves of pain\" across bilateral flanks and lower abdomen. He was taking 2000 mg of Tylenol in addition to his other medications. He had an CT abdomen that showed enteritis, mild intra-and extrahepatic duct dilatation, punctate calcifications in liver and spleen.     His pain worsens so he returned to the ER and was admitted.  He received mucomyst for potential Tylenol overdose.  He had mildly elevated ammonia up to 41 and was started on lactulose.  He denies any overt confusion or problems with mentation.      Labs were as followed:  2/12 AST 37 Alk Phos 129 direct bilirubin 1.3 total bilirubin 3.6  2/16  Alk Phos 338 direct bili 0.9 total bili 3.0   2/17  Alk Phos 400 direct bili 0.5 total bili 1.6  2/18  Alk Phos 301 direct bilirubin 0.3 total bili 1.1  2/22  Alk Phos 342  total bili 0.9    He states he is lost about 17 pounds during the month.  His weight has now stabilized and he has a good appetite again. He denies being told that he has had previous problems with his liver.    Repeat labs 1 month later continue to be more elevated    3/22  alk phos 1070 total bilirubin 1.8   3/26  alk phos 951 T bili 1.8  4/5 AST 96 alk phos 891 T bili 1.5 INR 0.98    Had a recent upper respiratory illness.  She is taking Tessalon.    Patient denies jaundice, lower extremity edema, abdominal distension or confusion.  Patient also denies " melena, hematochezia or hematemesis. Patient denies sweats or chills.    PMH: Coronary artery disease, GERD, recent upper respiratory illness, seasonal allergies, hypertension, hypothyroidism    PSH: Hernia repair January 2019, history of coronary stent placed and in August 2018 currently on Brilinta for 1 year, tonsillectomy, appendectomy, laparoscopic cholecystectomy in 2005    Medications:   Aspirin  Tessalon-started a few weeks ago for cough  Coenzyme Q 10 stopped after recent hospitalization  Colace as needed  Pepcid as needed  Fluticasone  Lactobacillus bifida S  Levothyroxine  Loratadine  Metoprolol succinate-has been on for years  Nitroglycerin-has not used  Ramipril - has been on for years  Saline nasal gel  Triamcinolone ointment  Acetaminophen  Atorvastatin    No history of tobacco use.  Patient has not drank alcohol since July 2018.  He denies any history of significant alcohol use.  He denies any intravenous or intranasal drug use.  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  He  lives with his wife.  He denies any family history of liver disease or liver cancer.  He denies any autoimmune conditions in his family.    Hepatitis A IgM antibody nonreactive  Hepatitis B surface antigen nonreactive  Hepatitis B surface antibody 1.89- not immune  Hepatitis B core antibody nonreactive  Hepatitis C antibody nonreactive  CARRIE weakly +1: 40  f-actin: Not done  Ceruloplasmin 46 normal  IgG 831  Alpha-1 antitrypsin 179 normal  A1 antitrypsin phenotype, MZMZ  Bone specific alk phos 107.5 -elevated  Iron panel -     Medical hx Surgical hx   Past Medical History:   Diagnosis Date     Dizziness and giddiness      Other skin changes      Right inguinal hernia 12/21/2018     Transient global amnesia     Past Surgical History:   Procedure Laterality Date     APPENDECTOMY OPEN      No Comments Provided     COLONOSCOPY  04/20/2009 2009,Due 2019     EXTRACAPSULAR CATARACT EXTRATION WITH INTRAOCULAR LENS  IMPLANT      2007,left sided, cataract extraction and intraocular lens     EXTRACAPSULAR CATARACT EXTRATION WITH INTRAOCULAR LENS IMPLANT      2007,right sided, cataract  extraction and intraocular lens     HEART CATH, ANGIOPLASTY      2003,stenting, mid left anterior descending lesion     HERNIORRHAPHY INGUINAL Right 1/9/2019    Procedure: Repair Right Inguinal Hernia w/ Mesh;  Surgeon: Aurora Verdin MD;  Location: [facility] OR     LAPAROSCOPIC CHOLECYSTECTOMY      2007,with cholangiogram     OTHER SURGICAL HISTORY      2005,224522,OTHER,90% LAD & 99% SECOND DIAGONAL     OTHER SURGICAL HISTORY      378596,OTHER,patient states sensitive to ANESTHESIA     OTHER SURGICAL HISTORY      11/17/15,[id number],TRANSURETHRAL RESECTION OF BLADDER TUMOR,Dr. Ras LUNA     SIGMOIDOSCOPY FLEXIBLE      1998     TONSILLECTOMY      No Comments Provided                 Medications:     Current Outpatient Medications   Medication     aspirin 81 MG chewable tablet     benzonatate (TESSALON) 100 MG capsule     BRILINTA 90 MG tablet     docusate sodium (COLACE) 100 MG capsule     famotidine (PEPCID) 10 MG tablet     fluticasone (FLONASE) 50 MCG/ACT spray     LACTOBACILLUS BIFIDUS PO     levothyroxine (SYNTHROID/LEVOTHROID) 100 MCG tablet     loratadine (CLARITIN) 10 MG tablet     metoprolol succinate (TOPROL-XL) 50 MG 24 hr tablet     nitroGLYcerin (NITROSTAT) 0.4 MG sublingual tablet     ramipril (ALTACE) 2.5 MG capsule     saline nasal (AYR SALINE) GEL topical gel     triamcinolone (KENALOG) 0.1 % ointment     acetaminophen (TYLENOL) 500 MG tablet     atorvastatin (LIPITOR) 40 MG tablet     fluorouracil (EFUDEX) 5 % cream     No current facility-administered medications for this visit.             Allergies:     Allergies   Allergen Reactions     Co Q 10 [Coenzyme Q10] Other (See Comments)     Encephalopathy, transaminitis.     Hydrocodone-Acetaminophen Nausea and Vomiting     Simvastatin Rash     Thimerosal Swelling     Diatrizoate Rash      Plavix [Clopidogrel] Rash            Social History:     Social History     Socioeconomic History     Marital status:      Spouse name: Not on file     Number of children: Not on file     Years of education: Not on file     Highest education level: Not on file   Occupational History     Not on file   Social Needs     Financial resource strain: Not on file     Food insecurity:     Worry: Not on file     Inability: Not on file     Transportation needs:     Medical: Not on file     Non-medical: Not on file   Tobacco Use     Smoking status: Never Smoker     Smokeless tobacco: Never Used   Substance and Sexual Activity     Alcohol use: No     Frequency: Never     Drug use: No     Types: Other     Comment: Drug use: No     Sexual activity: Yes   Lifestyle     Physical activity:     Days per week: Not on file     Minutes per session: Not on file     Stress: Not on file   Relationships     Social connections:     Talks on phone: Not on file     Gets together: Not on file     Attends Caodaism service: Not on file     Active member of club or organization: Not on file     Attends meetings of clubs or organizations: Not on file     Relationship status: Not on file     Intimate partner violence:     Fear of current or ex partner: Not on file     Emotionally abused: Not on file     Physically abused: Not on file     Forced sexual activity: Not on file   Other Topics Concern     Parent/sibling w/ CABG, MI or angioplasty before 65F 55M? Not Asked   Social History Narrative    Retired pharmacist from [employer].    08/07:    Eight minutes Nick protocol stress echo normal.    Never smoked. No coffee.  One soda daily.  Rare alcohol.    Likes to cross country ski and bike. .            Family History:     Family History   Problem Relation Age of Onset     Heart Disease Mother         Heart Disease,CAD     Heart Disease Father         Heart Disease,CAD     Prostate Cancer Father 80        Cancer-prostate     Heart Disease  "Brother         Heart Disease,3 of 4 have CAD     Prostate Cancer Brother 40        Cancer-prostate     Other - See Comments Brother         CABG sdc young age     Prostate Cancer Brother 65        Cancer-prostate     Other - See Comments Brother         no heart disease     Other - See Comments Brother         CABG sdc young age 40s     Colon Cancer No family hx of         Cancer-colon     Anesthesia Reaction No family hx of         Anesthesia Problem     Blood Disease No family hx of         Blood Disease,no bleeding or clotting              Review of Systems:   Gen: See HPI     HEENT: No change in vision or hearing, mouth sores, dysphagia, lymph nodes  Resp: No shortness of breath, coughing, hx of asthma  CV: No chest pain, palpitations, syncope   GI: See HPI  : No dysuria, history of stones, urine color    Skin: No rash; no pruritus or psoriasis  MS: No arthralgias, myalgias, joint swelling  Neuro: No memory changes, confusion, numbness    Heme: No difficulty clotting, bruising, bleeding  Psych:  No anxiety, depression, agitation          Physical Exam:   VS:  /70   Pulse 89   Temp 97.7  F (36.5  C) (Oral)   Ht 1.727 m (5' 8\")   Wt 65.8 kg (145 lb)   SpO2 97%   BMI 22.05 kg/m         Gen: A&Ox3, NAD, well developed  HEENT: non-icteric   CV: RRR, no overt murmurs  Lung: CTA Bilatererally, no wheezing or crackles.   Lym- no palpable lymphadenopathy  Abd: soft, NT, ND, no palpable splenomegaly, liver is not palpable.  Ext: no edema, intact pulses.   Skin: No rash, no palmar erythema, telangiectasias or jaundice  Neuro: grossly intact, no asterixis   Psych: appropriate mood and affects         Data:   Reviewed in person and significant for:    Lab Results   Component Value Date     03/26/2019      Lab Results   Component Value Date    POTASSIUM 3.7 03/26/2019     Lab Results   Component Value Date    CHLORIDE 105 03/26/2019     Lab Results   Component Value Date    CO2 25 03/26/2019     Lab " Results   Component Value Date    BUN 21 03/26/2019     Lab Results   Component Value Date    CR 1.10 03/26/2019       Lab Results   Component Value Date    WBC 7.1 03/26/2019     Lab Results   Component Value Date    HGB 13.6 03/26/2019     Lab Results   Component Value Date    HCT 43.3 03/26/2019     Lab Results   Component Value Date     03/26/2019     Lab Results   Component Value Date     03/26/2019       Lab Results   Component Value Date     03/26/2019     Lab Results   Component Value Date     03/26/2019     No results found for: BILICONJ   Lab Results   Component Value Date    BILITOTAL 1.8 03/26/2019       Lab Results   Component Value Date    ALBUMIN 3.7 03/26/2019     Lab Results   Component Value Date    PROTTOTAL 7.1 03/26/2019      Lab Results   Component Value Date    ALKPHOS 951 03/26/2019       No results found for: INR        CT of the abdomen on 2/12/2019:    showing punctate calcification in the left aurora-liver compatible with old healed granulomatous disease.  Changes of prior cholecystectomy are seen.  Mild intra-and extrahepatic duct dilatation.  Punctate calcifications in the spleen are compatible with old healed granulomatous disease.    Contrast-enhanced CT abdomen and pelvis on 2/12/2018.    Gallbladder surgically absent, there is diffuse fatty infiltration of the liver without focal abnormality.  The pancreas is unremarkable.  Calcified granuloma are noted throughout the spleen.    Impression findings suggesting 1. enteritis 2.  diverticulosis 3 other findings as above    Ultrasound gallbladder on 2/16/2019:   Liver is within normal limits.  Changes of prior cholecystectomy.  Pancreatic duct measures 2.6 mm common bile duct measures 3.7 cm at the sampson hepatis and 6.4 mm at the pancreas    US ABDOMEN:   3/27/219:   No evidence of portal vein, hepatic vein or hepatic arterial occlusion  or severe stenosis to account for elevated transaminases.     No evidence  of hepatic steatosis or cirrhosis. No ascites.          Kaylen Wise PA-C

## 2019-04-05 NOTE — NURSING NOTE
"Chief Complaint   Patient presents with     Consult     Liver     Blood pressure 109/70, pulse 89, temperature 97.7  F (36.5  C), temperature source Oral, height 1.727 m (5' 8\"), weight 65.8 kg (145 lb), SpO2 97 %.    Lamar Cox, [facility]    "

## 2019-04-06 ENCOUNTER — ANCILLARY PROCEDURE (OUTPATIENT)
Dept: MRI IMAGING | Facility: CLINIC | Age: 74
End: 2019-04-06
Attending: PHYSICIAN ASSISTANT
Payer: COMMERCIAL

## 2019-04-06 DIAGNOSIS — R70.0 ELEVATED SEDIMENTATION RATE: ICD-10-CM

## 2019-04-06 DIAGNOSIS — R74.8 ELEVATED ALKALINE PHOSPHATASE LEVEL: ICD-10-CM

## 2019-04-06 DIAGNOSIS — R74.01 ELEVATED TRANSAMINASE LEVEL: ICD-10-CM

## 2019-04-06 DIAGNOSIS — R94.5 ABNORMAL RESULTS OF LIVER FUNCTION STUDIES: ICD-10-CM

## 2019-04-06 LAB
ALBUMIN SERPL-MCNC: 3.6 G/DL (ref 3.4–5)
ALP SERPL-CCNC: 942 U/L (ref 40–150)
ALT SERPL W P-5'-P-CCNC: 170 U/L (ref 0–70)
AST SERPL W P-5'-P-CCNC: 103 U/L (ref 0–45)
BILIRUB DIRECT SERPL-MCNC: 0.5 MG/DL (ref 0–0.2)
BILIRUB SERPL-MCNC: 1.7 MG/DL (ref 0.2–1.3)
FERRITIN SERPL-MCNC: 136 NG/ML (ref 26–388)
INR PPP: 1.09 (ref 0.86–1.14)
IRON SATN MFR SERPL: 33 % (ref 15–46)
IRON SERPL-MCNC: 104 UG/DL (ref 35–180)
PROT SERPL-MCNC: 7.4 G/DL (ref 6.8–8.8)
TIBC SERPL-MCNC: 312 UG/DL (ref 240–430)

## 2019-04-06 PROCEDURE — 83516 IMMUNOASSAY NONANTIBODY: CPT | Performed by: PHYSICIAN ASSISTANT

## 2019-04-06 PROCEDURE — 82784 ASSAY IGA/IGD/IGG/IGM EACH: CPT | Performed by: PHYSICIAN ASSISTANT

## 2019-04-06 PROCEDURE — 83516 IMMUNOASSAY NONANTIBODY: CPT | Mod: 91 | Performed by: PHYSICIAN ASSISTANT

## 2019-04-06 RX ORDER — GADOBUTROL 604.72 MG/ML
7.5 INJECTION INTRAVENOUS ONCE
Status: COMPLETED | OUTPATIENT
Start: 2019-04-06 | End: 2019-04-06

## 2019-04-06 RX ADMIN — GADOBUTROL 7.5 ML: 604.72 INJECTION INTRAVENOUS at 12:00

## 2019-04-06 NOTE — DISCHARGE INSTRUCTIONS
MRI Contrast Discharge Instructions    The IV contrast you received today will pass out of your body in your  urine. This will happen in the next 24 hours. You will not feel this process.  Your urine will not change color.    Drink at least 4 extra glasses of water or juice today (unless your doctor  has restricted your fluids). This reduces the stress on your kidneys.  You may take your regular medicines.    If you are on dialysis: It is best to have dialysis today.    If you have a reaction: Most reactions happen right away. If you have  any new symptoms after leaving the hospital (such as hives or swelling),  call your hospital at the correct number below. Or call your family doctor.  If you have breathing distress or wheezing, call 911.    Special instructions: ***    I have read and understand the above information.    Signature:______________________________________ Date:___________    Staff:__________________________________________ Date:___________     Time:__________    [facility] Departments:    ___[place]: [phone]  ___[facility]: [phone]  ___[place]: [phone] ___[facility]: [phone]  ___[facility]: [phone]  ___[facility]: [phone]  ___[phone]  ___[facility]: [phone]  ___[phone]

## 2019-04-08 ENCOUNTER — CARE COORDINATION (OUTPATIENT)
Dept: GASTROENTEROLOGY | Facility: CLINIC | Age: 74
End: 2019-04-08

## 2019-04-08 DIAGNOSIS — R94.5 ABNORMAL RESULTS OF LIVER FUNCTION STUDIES: Primary | ICD-10-CM

## 2019-04-08 LAB
IGM SERPL-MCNC: 177 MG/DL (ref 60–265)
MITOCHONDRIA M2 IGG SER-ACNC: <1 U/ML
RADIOLOGIST FLAGS: NORMAL
TTG IGA SER-ACNC: <1 U/ML
TTG IGG SER-ACNC: <1 U/ML

## 2019-04-08 NOTE — TELEPHONE ENCOUNTER
Call back to pt. They have decided to postpone liver biopsy until pt can hold Brilinta (Aug 2019). Pt due for f/u with [facility]. Now scheduled for 04/16 at 0945. Luz Maria Cooper RN on 4/8/2019 at 8:20 AM

## 2019-04-08 NOTE — PROGRESS NOTES
Received message to organize the following:   I think he has a stone; would organize EUS with probable ERCP; if you order we will organize     Please assist in scheduling:     Procedure/Imaging/Clinic: EUS +/-ERCP   Physician: Eugenie   Timing: Within 4 weeks   Dx: Abnormal liver studies/MRCP       Evan     Procedure explained to patient.     Can expect a call for date and time for procedure.   Will need a , someone to stay with them for 24 hours and stay in town for 24 hours (within 45 min of Hospital) post procedure, rational explained.   Will get pre-op physical done at a [facility].    Blood thinner -  Yes, Brillinta  ASA - 81 mg daily   Diabetic - No  NSAIDS: No    A pre-op nurse will call 1-2 days prior to the procedure.   Is advised to be NPO/solid food at midnight before the procedure in the event the procedure time is moved up. Ok to drink clear liquids (Water, Apple Juice or Gatorade) up to 2 hours prior to procedure.     Post Procedure: start with clear liquids and then advance as tolerated.     Verbalized understanding of all instructions. All questions answered.   Contact information verified for future questions/concerns.     RUDY Masters Dr., Dr. Traore, & Dr. Rivero  [facility]  [phone]

## 2019-04-09 ENCOUNTER — TELEPHONE (OUTPATIENT)
Dept: CARDIOLOGY | Facility: OTHER | Age: 74
End: 2019-04-09

## 2019-04-09 ENCOUNTER — TELEPHONE (OUTPATIENT)
Dept: GASTROENTEROLOGY | Facility: CLINIC | Age: 74
End: 2019-04-09

## 2019-04-09 LAB — SMA IGG SER-ACNC: 5 UNITS (ref 0–19)

## 2019-04-09 NOTE — TELEPHONE ENCOUNTER
Call back to Suzanne, who states pt is now going to be scheduled for an endoscopic ultrasound with possibility of fine needle biopsy May 13th since pt is unable to stop Brilinta and do core liver biopsy. She is wondering if labs ordered by Dr. Wise at [facility] can be drawn at upcoming appt with [facility] on 04/16. Advised that labs can certainly be done. She also asks if appt with [facility] can serve as pre-op for procedure as well. Per [facility], this is fine. Pt notified. Luz Maria Cooper RN on 4/9/2019 at 11:28 AM

## 2019-04-09 NOTE — TELEPHONE ENCOUNTER
Spoke to patient in regards to scheduled procedure. Informed patient he is scheduled with Dr. Traore on 5/13/19. Informed patient he will need an updated pre-op physical within 30 days of his procedure. Patient stated he is going to have this done locally. Informed patient he will need a  and someone to monitor him for 24 hours after the procedure. Informed patient all scheduling details will be sent to the address listed on Epic and [employer] per request. Address confirmed on this call.     4/9/19 204pm

## 2019-04-09 NOTE — PROGRESS NOTES
During procedure scheduling call patients wife expressed concern that pt is on blood thinner Brillinta and cannot go off it (verfied via staff messages that MD team is aware). Wondering if any biopsy would be of his liver and expressed concern about the blood thinner. Stated that we were aware of the blood thinner issue as it related to a possible liver biopsy. Encouraged further questions regarding need for procedure or possible outcomes with provider at time of procedure. Patient and wife understand.    Angélica Cunningham RN Care Coordinator

## 2019-04-11 ENCOUNTER — CARE COORDINATION (OUTPATIENT)
Dept: GASTROENTEROLOGY | Facility: CLINIC | Age: 74
End: 2019-04-11

## 2019-04-11 NOTE — PROGRESS NOTES
Called to advise pt and wife that per Dr. Traore they will NOT be doing any liver biopsy during upcoming EUS as had been previously discussed, due to concerns about his anticoagulation.    Angélica Cunningham RN Care Coordinator

## 2019-04-16 ENCOUNTER — OFFICE VISIT (OUTPATIENT)
Dept: CARDIOLOGY | Facility: OTHER | Age: 74
End: 2019-04-16
Attending: INTERNAL MEDICINE
Payer: MEDICARE

## 2019-04-16 VITALS
RESPIRATION RATE: 12 BRPM | HEIGHT: 68 IN | BODY MASS INDEX: 22.13 KG/M2 | WEIGHT: 146 LBS | HEART RATE: 80 BPM | DIASTOLIC BLOOD PRESSURE: 78 MMHG | SYSTOLIC BLOOD PRESSURE: 132 MMHG | TEMPERATURE: 96.7 F

## 2019-04-16 DIAGNOSIS — Z95.5 HISTORY OF CORONARY ARTERY STENT PLACEMENT: ICD-10-CM

## 2019-04-16 DIAGNOSIS — E78.2 MIXED HYPERLIPIDEMIA: ICD-10-CM

## 2019-04-16 DIAGNOSIS — E03.9 ACQUIRED HYPOTHYROIDISM: Primary | Chronic | ICD-10-CM

## 2019-04-16 DIAGNOSIS — Z95.820 S/P ANGIOPLASTY WITH STENT: ICD-10-CM

## 2019-04-16 DIAGNOSIS — R63.4 WEIGHT LOSS: ICD-10-CM

## 2019-04-16 DIAGNOSIS — Z98.890 HX OF CARDIAC CATH: ICD-10-CM

## 2019-04-16 DIAGNOSIS — Z98.890 H/O CARDIAC CATHETERIZATION: ICD-10-CM

## 2019-04-16 DIAGNOSIS — I25.10 ASCVD (ARTERIOSCLEROTIC CARDIOVASCULAR DISEASE): Chronic | ICD-10-CM

## 2019-04-16 DIAGNOSIS — I25.118 CORONARY ARTERY DISEASE OF NATIVE ARTERY OF NATIVE HEART WITH STABLE ANGINA PECTORIS (H): Primary | ICD-10-CM

## 2019-04-16 DIAGNOSIS — R74.01 ELEVATED TRANSAMINASE LEVEL: ICD-10-CM

## 2019-04-16 DIAGNOSIS — R79.89 ABNORMAL LFTS (LIVER FUNCTION TESTS): ICD-10-CM

## 2019-04-16 DIAGNOSIS — I25.10 ATHEROSCLEROSIS OF NATIVE CORONARY ARTERY OF NATIVE HEART WITHOUT ANGINA PECTORIS: ICD-10-CM

## 2019-04-16 DIAGNOSIS — I10 ESSENTIAL HYPERTENSION: ICD-10-CM

## 2019-04-16 DIAGNOSIS — R73.9 HYPERGLYCEMIA: ICD-10-CM

## 2019-04-16 DIAGNOSIS — R94.5 LIVER FUNCTION ABNORMALITY: ICD-10-CM

## 2019-04-16 DIAGNOSIS — E03.9 ACQUIRED HYPOTHYROIDISM: Chronic | ICD-10-CM

## 2019-04-16 LAB
ALBUMIN SERPL-MCNC: 4.2 G/DL (ref 3.5–5.7)
ALP SERPL-CCNC: 596 U/L (ref 34–104)
ALT SERPL W P-5'-P-CCNC: 105 U/L (ref 7–52)
ANION GAP SERPL CALCULATED.3IONS-SCNC: 9 MMOL/L (ref 3–14)
AST SERPL W P-5'-P-CCNC: 72 U/L (ref 13–39)
BILIRUB DIRECT SERPL-MCNC: 0.3 MG/DL (ref 0–0.2)
BILIRUB SERPL-MCNC: 1.8 MG/DL (ref 0.3–1)
BUN SERPL-MCNC: 22 MG/DL (ref 7–25)
CALCIUM SERPL-MCNC: 10 MG/DL (ref 8.6–10.3)
CHLORIDE SERPL-SCNC: 103 MMOL/L (ref 98–107)
CHOLEST SERPL-MCNC: 231 MG/DL
CO2 SERPL-SCNC: 27 MMOL/L (ref 21–31)
CREAT SERPL-MCNC: 1.01 MG/DL (ref 0.7–1.3)
ERYTHROCYTE [DISTWIDTH] IN BLOOD BY AUTOMATED COUNT: 14.5 % (ref 10–15)
GFR SERPL CREATININE-BSD FRML MDRD: 72 ML/MIN/{1.73_M2}
GLUCOSE SERPL-MCNC: 104 MG/DL (ref 70–105)
HBA1C MFR BLD: 5.7 % (ref 4–6)
HCT VFR BLD AUTO: 46.5 % (ref 40–53)
HDLC SERPL-MCNC: 97 MG/DL (ref 23–92)
HGB BLD-MCNC: 14.8 G/DL (ref 13.3–17.7)
INR PPP: 0.97 (ref 0–1.3)
LDLC SERPL CALC-MCNC: 109 MG/DL
MCH RBC QN AUTO: 32.2 PG (ref 26.5–33)
MCHC RBC AUTO-ENTMCNC: 31.8 G/DL (ref 31.5–36.5)
MCV RBC AUTO: 101 FL (ref 78–100)
NONHDLC SERPL-MCNC: 134 MG/DL
PLATELET # BLD AUTO: 298 10E9/L (ref 150–450)
POTASSIUM SERPL-SCNC: 4.1 MMOL/L (ref 3.5–5.1)
PROT SERPL-MCNC: 7.3 G/DL (ref 6.4–8.9)
RBC # BLD AUTO: 4.59 10E12/L (ref 4.4–5.9)
SODIUM SERPL-SCNC: 139 MMOL/L (ref 134–144)
T4 FREE SERPL-MCNC: 1.74 NG/DL (ref 0.6–1.6)
TRIGL SERPL-MCNC: 127 MG/DL
TSH SERPL DL<=0.05 MIU/L-ACNC: <0.2 IU/ML (ref 0.34–5.6)
WBC # BLD AUTO: 6.5 10E9/L (ref 4–11)

## 2019-04-16 PROCEDURE — 84443 ASSAY THYROID STIM HORMONE: CPT | Performed by: INTERNAL MEDICINE

## 2019-04-16 PROCEDURE — 36415 COLL VENOUS BLD VENIPUNCTURE: CPT | Performed by: INTERNAL MEDICINE

## 2019-04-16 PROCEDURE — 80061 LIPID PANEL: CPT | Performed by: INTERNAL MEDICINE

## 2019-04-16 PROCEDURE — 80076 HEPATIC FUNCTION PANEL: CPT | Performed by: INTERNAL MEDICINE

## 2019-04-16 PROCEDURE — 93005 ELECTROCARDIOGRAM TRACING: CPT | Performed by: INTERNAL MEDICINE

## 2019-04-16 PROCEDURE — G0463 HOSPITAL OUTPT CLINIC VISIT: HCPCS

## 2019-04-16 PROCEDURE — 85610 PROTHROMBIN TIME: CPT | Performed by: INTERNAL MEDICINE

## 2019-04-16 PROCEDURE — 85027 COMPLETE CBC AUTOMATED: CPT | Performed by: INTERNAL MEDICINE

## 2019-04-16 PROCEDURE — 99214 OFFICE O/P EST MOD 30 MIN: CPT | Performed by: INTERNAL MEDICINE

## 2019-04-16 PROCEDURE — 83036 HEMOGLOBIN GLYCOSYLATED A1C: CPT | Performed by: INTERNAL MEDICINE

## 2019-04-16 PROCEDURE — 84439 ASSAY OF FREE THYROXINE: CPT | Performed by: INTERNAL MEDICINE

## 2019-04-16 PROCEDURE — 80048 BASIC METABOLIC PNL TOTAL CA: CPT | Performed by: INTERNAL MEDICINE

## 2019-04-16 PROCEDURE — 93010 ELECTROCARDIOGRAM REPORT: CPT | Performed by: INTERNAL MEDICINE

## 2019-04-16 RX ORDER — LEVOTHYROXINE SODIUM 88 UG/1
88 TABLET ORAL DAILY
Qty: 30 TABLET | Refills: 11 | Status: SHIPPED | OUTPATIENT
Start: 2019-04-16 | End: 2019-08-05

## 2019-04-16 ASSESSMENT — MIFFLIN-ST. JEOR: SCORE: 1371.75

## 2019-04-16 ASSESSMENT — PAIN SCALES - GENERAL: PAINLEVEL: NO PAIN (0)

## 2019-04-16 NOTE — NURSING NOTE
"Chief Complaint   Patient presents with     Follow Up     6 month follow up       Initial /78 (BP Location: Right arm, Patient Position: Sitting, Cuff Size: Adult Regular)   Pulse 80   Temp 96.7  F (35.9  C) (Tympanic)   Resp 12   Ht 1.727 m (5' 8\")   Wt 66.2 kg (146 lb)   BMI 22.20 kg/m   Estimated body mass index is 22.2 kg/m  as calculated from the following:    Height as of this encounter: 1.727 m (5' 8\").    Weight as of this encounter: 66.2 kg (146 lb).  Medication Reconciliation: complete    Kalli Vazquez LPN    "

## 2019-04-16 NOTE — PROGRESS NOTES
Cardiology Progress Note     Assessment & Plan   Sandro Yoon is a 74 year old male who is being seen by cardiology in follow-up to visit from 8/30/18.  He has been referred to [facility] secondary to exertional chest discomfort with history of coronary artery disease at his request.  He was found to have rather significant disease in his LAD. He is status post x2 stent placement.  He is currently in cardiac rehab.  He has been having some fullness/tenderness to his right axis site from his recent catheterization.  He completed ultrasound to look for an AV fistula on 9/4/18 which was negative first tissue swelling without evidence for AV fistula or pseudoaneurysm.  Brilinta is rather expensive but he had a rash on Plavix.  We discussed potentially looking at Prasugrel as an alternative.      He has been following up with GI for liver lab abnormalities.  He is scheduled for an endoscopic ultrasound of his liver with biopsies.  However, he cannot be off aspirin and Brilinta until 8/14/19 and as a result will have an ERCP.  He is here for cardiac clearance.     Impression:  1.  History of cardiac catheterization on 8/14/18 ×2 to his LAD through [facility] in [place], [place].  2.  Concern for an arteriovenous fistula to his right groin versus hematoma at his access site was ruled out on 9/4/18.  3.  History of cardiac catheterization on 6/16/2005 and 9/3/2003 at [place] with stenting to his LAD.  4.  Hypertension.  5.  Hypercholesteremia.  6.  Hypothyroidism.     Plan:  1.  He is fasting today and will have a lipid panel, TSH, and A1c completed.  2.  No additional testing prior to his planned GI procedure.  Again, he needs to remain on aspirin and Brilinta until 8/14/19.  3.  He will be seen in approximately 6 months follow-up.          Tor Arguello    Interval History   He was having exertional jaw pain which was previously his anginal equivalent.  He had a 3 block stent that he walks on a regular  basis.  For the last 3 weeks, he had been having some jaw discomfort and having to stop at 1.5 blocks.  He was getting chest discomfort.  He denied nausea, vomiting, diaphoresis, or dizziness.  Previously, he had intrascapular pain but he was not having it at that time.       He went on to have a stress echo on 7/30/18.  His stress test was abnormal with a baseline EF 55-60% without wall motion abnormalities.  However, with stress, his EF increased to 65%, he had jaw pain, and he had akinesis within the LAD territory.      He went on to have a cardiac catheterization through [facility] at his request on 8/14/18.  He was found to have proximal 90% ostial stenosis, proximal 80% stenosis, and a 97% stenosis to the midportion of the vessel. He was found to have an 80% stenosis to his RCA.  He ultimately had ×2 stents placed to his proximal and distal LAD. His RCA was nondominant and small.     He had an echocardiogram on 7/30/18. His ejection fraction was 55-60%.  With stress, there was akinesis to the LAD territory. His EF increased to 65% with stress.  He also had some jaw pain with a normal blood pressure response.     Since being seen in [facility], his discomfort is now gone.  His exercise capabilities have increased and are better than they had been prior to the procedure.  They initially had tried access through his wrist.  However, they were unsuccessful in putting a stent to his wrist.  Ultimately, the had to go through his right groin.  This area is rather swollen and he describes a lot of bruising.  It was suggested he have an ultrasound for bruising versus a fistula and he was agreeable.  The cost of the Brilinta is rather high.  However, he did have a rash on Plavix.  Briefly, we did discuss Prasugrel as an alternative option if this were cheaper.  However, he appears to be comfortable with Brilinta.        Physical Exam   Temp: 96.7  F (35.9  C) Temp src: Tympanic BP: 132/78 Pulse: 80   Resp: 12         Vitals:    04/16/19 0959   Weight: 66.2 kg (146 lb)     Vital Signs with Ranges  Temp:  [96.7  F (35.9  C)] 96.7  F (35.9  C)  Pulse:  [80] 80  Resp:  [12] 12  BP: (132)/(78) 132/78  ROS is negative except that which was noted in the HPI.        Incision/Surgical Site 01/09/19 Right Other (Comment) (Active)   Closure Adhesive strip(s);Sutures;Other (Comment) 1/9/2019  8:39 AM   Incision Drainage Amount None 1/9/2019  9:00 AM   Dressing Intervention Clean, dry, intact 1/9/2019  9:00 AM   Number of days: 97       Constitutional: awake, alert, cooperative, no apparent distress, and appears stated age  Eyes: Lids and lashes normal, pupils equal, sclera clear, conjunctiva normal  ENT: Normocephalic, without obvious abnormality, atraumatic.  Respiratory: No increased work of breathing, good air exchange, clear to auscultation bilaterally, no crackles or wheezing  Cardiovascular: Normal apical impulse, regular rate and rhythm, normal S1 and S2, no S3 or S4, and no murmur noted  Musculoskeletal: no lower extremity pitting edema present  Neurologic: Awake, alert, oriented to name, place and time.    Neuropsychiatric: General: normal, calm and normal eye contact        Medications         Data   No results found for this or any previous visit (from the past 24 hour(s)).  No results found for this or any previous visit (from the past 24 hour(s)).

## 2019-04-22 DIAGNOSIS — R74.01 ELEVATED TRANSAMINASE LEVEL: ICD-10-CM

## 2019-04-22 DIAGNOSIS — R94.5 ABNORMAL RESULTS OF LIVER FUNCTION STUDIES: ICD-10-CM

## 2019-04-22 DIAGNOSIS — R74.8 ELEVATED ALKALINE PHOSPHATASE LEVEL: ICD-10-CM

## 2019-04-22 LAB
ALBUMIN SERPL-MCNC: 3.8 G/DL (ref 3.5–5.7)
ALP SERPL-CCNC: 464 U/L (ref 34–104)
ALT SERPL W P-5'-P-CCNC: 82 U/L (ref 7–52)
AST SERPL W P-5'-P-CCNC: 56 U/L (ref 13–39)
BILIRUB DIRECT SERPL-MCNC: 0.1 MG/DL (ref 0–0.2)
BILIRUB SERPL-MCNC: 1.4 MG/DL (ref 0.3–1)
INR PPP: 0.99 (ref 0–1.3)
PROT SERPL-MCNC: 6.6 G/DL (ref 6.4–8.9)

## 2019-04-22 PROCEDURE — 36415 COLL VENOUS BLD VENIPUNCTURE: CPT | Performed by: PHYSICIAN ASSISTANT

## 2019-04-22 PROCEDURE — 80076 HEPATIC FUNCTION PANEL: CPT | Performed by: PHYSICIAN ASSISTANT

## 2019-04-22 PROCEDURE — 85610 PROTHROMBIN TIME: CPT | Performed by: PHYSICIAN ASSISTANT

## 2019-04-29 DIAGNOSIS — R74.8 ELEVATED ALKALINE PHOSPHATASE LEVEL: ICD-10-CM

## 2019-04-29 DIAGNOSIS — R94.5 ABNORMAL RESULTS OF LIVER FUNCTION STUDIES: ICD-10-CM

## 2019-04-29 DIAGNOSIS — R74.01 ELEVATED TRANSAMINASE LEVEL: ICD-10-CM

## 2019-04-29 LAB
ALBUMIN SERPL-MCNC: 3.9 G/DL (ref 3.5–5.7)
ALP SERPL-CCNC: 437 U/L (ref 34–104)
ALT SERPL W P-5'-P-CCNC: 79 U/L (ref 7–52)
AST SERPL W P-5'-P-CCNC: 57 U/L (ref 13–39)
BILIRUB DIRECT SERPL-MCNC: 0.4 MG/DL (ref 0–0.2)
BILIRUB SERPL-MCNC: 1.7 MG/DL (ref 0.3–1)
INR PPP: 0.94 (ref 0–1.3)
PROT SERPL-MCNC: 6.7 G/DL (ref 6.4–8.9)

## 2019-04-29 PROCEDURE — 80076 HEPATIC FUNCTION PANEL: CPT | Performed by: PHYSICIAN ASSISTANT

## 2019-04-29 PROCEDURE — 36415 COLL VENOUS BLD VENIPUNCTURE: CPT | Performed by: PHYSICIAN ASSISTANT

## 2019-04-29 PROCEDURE — 85610 PROTHROMBIN TIME: CPT | Performed by: PHYSICIAN ASSISTANT

## 2019-05-06 DIAGNOSIS — R79.89 ABNORMAL LFTS (LIVER FUNCTION TESTS): ICD-10-CM

## 2019-05-06 DIAGNOSIS — R74.01 ELEVATED TRANSAMINASE LEVEL: ICD-10-CM

## 2019-05-06 LAB
ALBUMIN SERPL-MCNC: 3.9 G/DL (ref 3.5–5.7)
ALP SERPL-CCNC: 400 U/L (ref 34–104)
ALT SERPL W P-5'-P-CCNC: 75 U/L (ref 7–52)
AST SERPL W P-5'-P-CCNC: 56 U/L (ref 13–39)
BILIRUB DIRECT SERPL-MCNC: 0.3 MG/DL (ref 0–0.2)
BILIRUB SERPL-MCNC: 1.5 MG/DL (ref 0.3–1)
INR PPP: 0.94 (ref 0–1.3)
PROT SERPL-MCNC: 6.5 G/DL (ref 6.4–8.9)

## 2019-05-06 PROCEDURE — 85610 PROTHROMBIN TIME: CPT | Performed by: PHYSICIAN ASSISTANT

## 2019-05-06 PROCEDURE — 36415 COLL VENOUS BLD VENIPUNCTURE: CPT | Performed by: PHYSICIAN ASSISTANT

## 2019-05-06 PROCEDURE — 80076 HEPATIC FUNCTION PANEL: CPT | Performed by: PHYSICIAN ASSISTANT

## 2019-05-07 DIAGNOSIS — J30.9 ALLERGIC RHINITIS, UNSPECIFIED SEASONALITY, UNSPECIFIED TRIGGER: Primary | ICD-10-CM

## 2019-05-09 RX ORDER — FLUTICASONE PROPIONATE 50 MCG
2 SPRAY, SUSPENSION (ML) NASAL DAILY
Qty: 48 G | Refills: 3 | Status: SHIPPED | OUTPATIENT
Start: 2019-05-09 | End: 2019-09-10

## 2019-05-10 ENCOUNTER — ANESTHESIA EVENT (OUTPATIENT)
Dept: SURGERY | Facility: CLINIC | Age: 74
End: 2019-05-10
Payer: MEDICARE

## 2019-05-10 NOTE — ANESTHESIA PREPROCEDURE EVALUATION
Anesthesia Pre-Procedure Evaluation    Patient: Sandro Yoon   MRN: [id number] : 1945          Preoperative Diagnosis: ERCP    Past Medical History:   Diagnosis Date     Antiplatelet or antithrombotic long-term use      Coronary artery disease      Dizziness and giddiness     ,episode     Hypertension      Other skin changes     perianal area     Right inguinal hernia 2018     Stented coronary artery     x4     Thyroid disease     hypothyroidism     Transient global amnesia     hx of     Past Surgical History:   Procedure Laterality Date     APPENDECTOMY OPEN      No Comments Provided     COLONOSCOPY  2009,Due      EXTRACAPSULAR CATARACT EXTRATION WITH INTRAOCULAR LENS IMPLANT      2007,left sided, cataract extraction and intraocular lens     EXTRACAPSULAR CATARACT EXTRATION WITH INTRAOCULAR LENS IMPLANT      ,right sided, cataract  extraction and intraocular lens     HEART CATH, ANGIOPLASTY      ,stenting, mid left anterior descending lesion     HERNIORRHAPHY INGUINAL Right 2019    Procedure: Repair Right Inguinal Hernia w/ Mesh;  Surgeon: Aurora Verdin MD;  Location: [facility] OR     LAPAROSCOPIC CHOLECYSTECTOMY      ,with cholangiogram     OTHER SURGICAL HISTORY      2005,[id number],OTHER,90% LAD & 99% SECOND DIAGONAL     OTHER SURGICAL HISTORY      [id number],OTHER,patient states sensitive to ANESTHESIA     OTHER SURGICAL HISTORY      11/17/15,[id number],TRANSURETHRAL RESECTION OF BLADDER TUMOR,Dr. Ras LUNA     SIGMOIDOSCOPY FLEXIBLE           TONSILLECTOMY      No Comments Provided       Anesthesia Evaluation     . Pt has had prior anesthetic. Type: MAC and General    No history of anesthetic complications          ROS/MED HX    ENT/Pulmonary:  - neg pulmonary ROS     Neurologic:  - neg neurologic ROS     Cardiovascular: Comment: Stent placed in August. No s/s of chest pain or sob since    (+) hypertension--CAD, angina--stent,  . Taking blood thinners : . . . :.  . Previous cardiac testing date:results:date: results:ECG reviewed date:4-16-19 results:EKG Interpretation:   Rhythm: Sinus  Rate: 73 bpm  Axis: Normal  Conduction: Normal  QRS: Normal  ST Segments: Normal  T-wave: Normal  PAC's Present: No  PVC's Present: No  QTc: 434 ms    Impression:   Normal sinus rhythm at 73 bpm.    Tor WRena Arguello, DO   4/16/2019 at 7:38 PM date: results:          METS/Exercise Tolerance:     Hematologic:  - neg hematologic  ROS       Musculoskeletal:  - neg musculoskeletal ROS       GI/Hepatic: Comment: Occasional symptoms of reflux. Takes TUMS prn    (+) GERD Other, liver disease (elevated LFTs),       Renal/Genitourinary:  - ROS Renal section negative       Endo:     (+) thyroid problem hypothyroidism, .      Psychiatric:  - neg psychiatric ROS       Infectious Disease:  - neg infectious disease ROS       Malignancy:      - no malignancy   Other:    - neg other ROS                      Physical Exam  Normal systems: cardiovascular, pulmonary and dental    Airway   Mallampati: II  TM distance: >3 FB  Neck ROM: full    Dental   (+) implants and caps    Cardiovascular   Rhythm and rate: normal      Pulmonary    breath sounds clear to auscultation    Other findings: Natural dentition with dental crowns and an implant        Lab Results   Component Value Date    WBC 6.5 04/16/2019    HGB 14.8 04/16/2019    HCT 46.5 04/16/2019     04/16/2019    CRP 0.6 (H) 03/26/2019    SED 63 (H) 03/26/2019     04/16/2019    POTASSIUM 4.1 04/16/2019    CHLORIDE 103 04/16/2019    CO2 27 04/16/2019    BUN 22 04/16/2019    CR 1.01 04/16/2019     04/16/2019    JESSICA 10.0 04/16/2019    ALBUMIN 3.9 05/06/2019    PROTTOTAL 6.5 05/06/2019    ALT 75 (H) 05/06/2019    AST 56 (H) 05/06/2019    GGT 1,640 (H) 03/26/2019    ALKPHOS 400 (H) 05/06/2019    BILITOTAL 1.5 (H) 05/06/2019    INR 0.94 05/06/2019    T4 1.74 (H) 04/16/2019       Preop Vitals  BP Readings from Last 3 Encounters:   04/16/19 132/78  "  04/05/19 109/70   03/28/19 104/68    Pulse Readings from Last 3 Encounters:   04/16/19 80   04/05/19 89   03/28/19 78      Resp Readings from Last 3 Encounters:   04/16/19 12   03/28/19 14   03/08/19 16    SpO2 Readings from Last 3 Encounters:   04/05/19 97%   03/06/19 95%   01/09/19 96%      Temp Readings from Last 1 Encounters:   04/16/19 35.9  C (96.7  F) (Tympanic)    Ht Readings from Last 1 Encounters:   04/16/19 1.727 m (5' 8\")      Wt Readings from Last 1 Encounters:   04/16/19 66.2 kg (146 lb)    Estimated body mass index is 22.2 kg/m  as calculated from the following:    Height as of 4/16/19: 1.727 m (5' 8\").    Weight as of 4/16/19: 66.2 kg (146 lb).       Anesthesia Plan      History & Physical Review  History and physical reviewed and following examination; no interval change.    ASA Status:  3 .        Plan for General with Intravenous induction. Maintenance will be Inhalation and Balanced.    PONV prophylaxis:  Ondansetron (or other 5HT-3) and Dexamethasone or Solumedrol       Postoperative Care  Postoperative pain management:  IV analgesics.      Consents  Anesthetic plan, risks, benefits and alternatives discussed with:  Patient.  Use of blood products discussed: No .   .                 Duane F. Szczepanski, MD    [facility] AN PHYSICAL EXAM    Assessment:   ASA SCORE: 3                     "

## 2019-05-13 ENCOUNTER — ANESTHESIA (OUTPATIENT)
Dept: SURGERY | Facility: CLINIC | Age: 74
End: 2019-05-13
Payer: MEDICARE

## 2019-05-13 ENCOUNTER — HOSPITAL ENCOUNTER (OUTPATIENT)
Facility: CLINIC | Age: 74
Discharge: HOME | End: 2019-05-13
Attending: INTERNAL MEDICINE | Admitting: INTERNAL MEDICINE
Payer: MEDICARE

## 2019-05-13 VITALS
BODY MASS INDEX: 23.05 KG/M2 | HEART RATE: 76 BPM | SYSTOLIC BLOOD PRESSURE: 143 MMHG | TEMPERATURE: 96.9 F | RESPIRATION RATE: 18 BRPM | DIASTOLIC BLOOD PRESSURE: 88 MMHG | OXYGEN SATURATION: 98 % | HEIGHT: 68 IN | WEIGHT: 152.12 LBS

## 2019-05-13 LAB
ALBUMIN SERPL-MCNC: 3.7 G/DL (ref 3.4–5)
ALP SERPL-CCNC: 567 U/L (ref 40–150)
ALT SERPL W P-5'-P-CCNC: 115 U/L (ref 0–70)
AMYLASE SERPL-CCNC: 81 U/L (ref 30–110)
ANION GAP SERPL CALCULATED.3IONS-SCNC: 7 MMOL/L (ref 3–14)
AST SERPL W P-5'-P-CCNC: 74 U/L (ref 0–45)
BILIRUB SERPL-MCNC: 1.6 MG/DL (ref 0.2–1.3)
BUN SERPL-MCNC: 22 MG/DL (ref 7–30)
CALCIUM SERPL-MCNC: 9.2 MG/DL (ref 8.5–10.1)
CHLORIDE SERPL-SCNC: 106 MMOL/L (ref 94–109)
CO2 SERPL-SCNC: 25 MMOL/L (ref 20–32)
CREAT SERPL-MCNC: 1.06 MG/DL (ref 0.66–1.25)
ERYTHROCYTE [DISTWIDTH] IN BLOOD BY AUTOMATED COUNT: 13.5 % (ref 10–15)
GFR SERPL CREATININE-BSD FRML MDRD: 68 ML/MIN/{1.73_M2}
GLUCOSE BLDC GLUCOMTR-MCNC: 96 MG/DL (ref 70–99)
GLUCOSE SERPL-MCNC: 96 MG/DL (ref 70–99)
HCT VFR BLD AUTO: 49.5 % (ref 40–53)
HGB BLD-MCNC: 15.3 G/DL (ref 13.3–17.7)
INR PPP: 1.01 (ref 0.86–1.14)
LIPASE SERPL-CCNC: 175 U/L (ref 73–393)
MCH RBC QN AUTO: 32.3 PG (ref 26.5–33)
MCHC RBC AUTO-ENTMCNC: 30.9 G/DL (ref 31.5–36.5)
MCV RBC AUTO: 105 FL (ref 78–100)
PLATELET # BLD AUTO: 288 10E9/L (ref 150–450)
POTASSIUM SERPL-SCNC: 3.5 MMOL/L (ref 3.4–5.3)
PROT SERPL-MCNC: 7.6 G/DL (ref 6.8–8.8)
RBC # BLD AUTO: 4.73 10E12/L (ref 4.4–5.9)
SODIUM SERPL-SCNC: 138 MMOL/L (ref 133–144)
UPPER EUS: NORMAL
WBC # BLD AUTO: 6.5 10E9/L (ref 4–11)

## 2019-05-13 PROCEDURE — 36000053 ZZH SURGERY LEVEL 2 EA 15 ADDTL MIN - UMMC: Performed by: INTERNAL MEDICINE

## 2019-05-13 PROCEDURE — 25000128 H RX IP 250 OP 636: Performed by: NURSE ANESTHETIST, CERTIFIED REGISTERED

## 2019-05-13 PROCEDURE — 25000566 ZZH SEVOFLURANE, EA 15 MIN: Performed by: INTERNAL MEDICINE

## 2019-05-13 PROCEDURE — 71000012 ZZH RECOVERY PHASE 1 LEVEL 1 FIRST HR: Performed by: INTERNAL MEDICINE

## 2019-05-13 PROCEDURE — 83690 ASSAY OF LIPASE: CPT | Performed by: INTERNAL MEDICINE

## 2019-05-13 PROCEDURE — 82150 ASSAY OF AMYLASE: CPT | Performed by: INTERNAL MEDICINE

## 2019-05-13 PROCEDURE — 36000051 ZZH SURGERY LEVEL 2 1ST 30 MIN - UMMC: Performed by: INTERNAL MEDICINE

## 2019-05-13 PROCEDURE — 25000125 ZZHC RX 250: Performed by: NURSE ANESTHETIST, CERTIFIED REGISTERED

## 2019-05-13 PROCEDURE — 85610 PROTHROMBIN TIME: CPT | Performed by: INTERNAL MEDICINE

## 2019-05-13 PROCEDURE — 27210794 ZZH OR GENERAL SUPPLY STERILE: Performed by: INTERNAL MEDICINE

## 2019-05-13 PROCEDURE — A9270 NON-COVERED ITEM OR SERVICE: HCPCS | Performed by: INTERNAL MEDICINE

## 2019-05-13 PROCEDURE — 85027 COMPLETE CBC AUTOMATED: CPT | Performed by: INTERNAL MEDICINE

## 2019-05-13 PROCEDURE — 80053 COMPREHEN METABOLIC PANEL: CPT | Performed by: INTERNAL MEDICINE

## 2019-05-13 PROCEDURE — 40000170 ZZH STATISTIC PRE-PROCEDURE ASSESSMENT II: Performed by: INTERNAL MEDICINE

## 2019-05-13 PROCEDURE — 25000125 ZZHC RX 250: Performed by: INTERNAL MEDICINE

## 2019-05-13 PROCEDURE — 37000009 ZZH ANESTHESIA TECHNICAL FEE, EACH ADDTL 15 MIN: Performed by: INTERNAL MEDICINE

## 2019-05-13 PROCEDURE — 36415 COLL VENOUS BLD VENIPUNCTURE: CPT | Performed by: INTERNAL MEDICINE

## 2019-05-13 PROCEDURE — 25800030 ZZH RX IP 258 OP 636: Performed by: NURSE ANESTHETIST, CERTIFIED REGISTERED

## 2019-05-13 PROCEDURE — 82962 GLUCOSE BLOOD TEST: CPT

## 2019-05-13 PROCEDURE — 37000008 ZZH ANESTHESIA TECHNICAL FEE, 1ST 30 MIN: Performed by: INTERNAL MEDICINE

## 2019-05-13 PROCEDURE — 71000027 ZZH RECOVERY PHASE 2 EACH 15 MINS: Performed by: INTERNAL MEDICINE

## 2019-05-13 RX ORDER — ONDANSETRON 2 MG/ML
4 INJECTION INTRAMUSCULAR; INTRAVENOUS ONCE
Status: DISCONTINUED | OUTPATIENT
Start: 2019-05-13 | End: 2019-05-13 | Stop reason: HOSPADM

## 2019-05-13 RX ORDER — PROPOFOL 10 MG/ML
INJECTION, EMULSION INTRAVENOUS PRN
Status: DISCONTINUED | OUTPATIENT
Start: 2019-05-13 | End: 2019-05-13

## 2019-05-13 RX ORDER — ONDANSETRON 2 MG/ML
INJECTION INTRAMUSCULAR; INTRAVENOUS PRN
Status: DISCONTINUED | OUTPATIENT
Start: 2019-05-13 | End: 2019-05-13

## 2019-05-13 RX ORDER — FLUMAZENIL 0.1 MG/ML
0.2 INJECTION, SOLUTION INTRAVENOUS
Status: DISCONTINUED | OUTPATIENT
Start: 2019-05-13 | End: 2019-05-13 | Stop reason: HOSPADM

## 2019-05-13 RX ORDER — SODIUM CHLORIDE, SODIUM LACTATE, POTASSIUM CHLORIDE, CALCIUM CHLORIDE 600; 310; 30; 20 MG/100ML; MG/100ML; MG/100ML; MG/100ML
INJECTION, SOLUTION INTRAVENOUS CONTINUOUS PRN
Status: DISCONTINUED | OUTPATIENT
Start: 2019-05-13 | End: 2019-05-13

## 2019-05-13 RX ORDER — NALOXONE HYDROCHLORIDE 0.4 MG/ML
.1-.4 INJECTION, SOLUTION INTRAMUSCULAR; INTRAVENOUS; SUBCUTANEOUS
Status: DISCONTINUED | OUTPATIENT
Start: 2019-05-13 | End: 2019-05-13 | Stop reason: HOSPADM

## 2019-05-13 RX ORDER — DEXAMETHASONE SODIUM PHOSPHATE 4 MG/ML
INJECTION, SOLUTION INTRA-ARTICULAR; INTRALESIONAL; INTRAMUSCULAR; INTRAVENOUS; SOFT TISSUE PRN
Status: DISCONTINUED | OUTPATIENT
Start: 2019-05-13 | End: 2019-05-13

## 2019-05-13 RX ORDER — LIDOCAINE 40 MG/G
CREAM TOPICAL
Status: DISCONTINUED | OUTPATIENT
Start: 2019-05-13 | End: 2019-05-13 | Stop reason: HOSPADM

## 2019-05-13 RX ORDER — LIDOCAINE HYDROCHLORIDE 20 MG/ML
INJECTION, SOLUTION INFILTRATION; PERINEURAL PRN
Status: DISCONTINUED | OUTPATIENT
Start: 2019-05-13 | End: 2019-05-13

## 2019-05-13 RX ORDER — INDOMETHACIN 50 MG/1
100 SUPPOSITORY RECTAL
Status: DISCONTINUED | OUTPATIENT
Start: 2019-05-13 | End: 2019-05-13 | Stop reason: HOSPADM

## 2019-05-13 RX ORDER — FENTANYL CITRATE 50 UG/ML
INJECTION, SOLUTION INTRAMUSCULAR; INTRAVENOUS PRN
Status: DISCONTINUED | OUTPATIENT
Start: 2019-05-13 | End: 2019-05-13

## 2019-05-13 RX ADMIN — DEXAMETHASONE SODIUM PHOSPHATE 6 MG: 4 INJECTION, SOLUTION INTRA-ARTICULAR; INTRALESIONAL; INTRAMUSCULAR; INTRAVENOUS; SOFT TISSUE at 13:21

## 2019-05-13 RX ADMIN — ONDANSETRON 4 MG: 2 INJECTION INTRAMUSCULAR; INTRAVENOUS at 13:43

## 2019-05-13 RX ADMIN — ROCURONIUM BROMIDE 40 MG: 10 INJECTION INTRAVENOUS at 12:53

## 2019-05-13 RX ADMIN — PHENYLEPHRINE HYDROCHLORIDE 100 MCG: 10 INJECTION, SOLUTION INTRAMUSCULAR; INTRAVENOUS; SUBCUTANEOUS at 13:12

## 2019-05-13 RX ADMIN — FENTANYL CITRATE 100 MCG: 50 INJECTION, SOLUTION INTRAMUSCULAR; INTRAVENOUS at 12:53

## 2019-05-13 RX ADMIN — PHENYLEPHRINE HYDROCHLORIDE 100 MCG: 10 INJECTION, SOLUTION INTRAMUSCULAR; INTRAVENOUS; SUBCUTANEOUS at 13:06

## 2019-05-13 RX ADMIN — LIDOCAINE HYDROCHLORIDE 100 MG: 20 INJECTION, SOLUTION INFILTRATION; PERINEURAL at 12:53

## 2019-05-13 RX ADMIN — SODIUM CHLORIDE, POTASSIUM CHLORIDE, SODIUM LACTATE AND CALCIUM CHLORIDE: 600; 310; 30; 20 INJECTION, SOLUTION INTRAVENOUS at 12:40

## 2019-05-13 RX ADMIN — SUGAMMADEX 150 MG: 100 INJECTION, SOLUTION INTRAVENOUS at 13:51

## 2019-05-13 RX ADMIN — PHENYLEPHRINE HYDROCHLORIDE 100 MCG: 10 INJECTION, SOLUTION INTRAMUSCULAR; INTRAVENOUS; SUBCUTANEOUS at 13:18

## 2019-05-13 RX ADMIN — PROPOFOL 200 MG: 10 INJECTION, EMULSION INTRAVENOUS at 12:53

## 2019-05-13 ASSESSMENT — MIFFLIN-ST. JEOR: SCORE: 1399.5

## 2019-05-13 NOTE — DISCHARGE INSTRUCTIONS
[facility]  Same-Day Surgery   Adult Discharge Orders & Instructions     For 24 hours after surgery    1. Get plenty of rest.  A responsible adult must stay with you for at least 24 hours after you leave the hospital.   2. Do not drive or use heavy equipment.  If you have weakness or tingling, don't drive or use heavy equipment until this feeling goes away.  3. Do not drink alcohol.  4. Avoid strenuous or risky activities.  Ask for help when climbing stairs.   5. You may feel lightheaded.  IF so, sit for a few minutes before standing.  Have someone help you get up.   6. If you have nausea (feel sick to your stomach): Drink only clear liquids such as apple juice, ginger ale, broth or 7-Up.  Rest may also help.  Be sure to drink enough fluids.  Move to a regular diet as you feel able.  7. You may have a slight fever. Call the doctor if your fever is over 100 F (37.7 C) (taken under the tongue) or lasts longer than 24 hours.  8. You may have a dry mouth, a sore throat, muscle aches or trouble sleeping.  These should go away after 24 hours.  9. Do not make important or legal decisions.   Call your doctor for any of the followin.  Signs of infection (fever, growing tenderness at the surgery site, a large amount of drainage or bleeding, severe pain, foul-smelling drainage, redness, swelling).    2. It has been over 8 to 10 hours since surgery and you are still not able to urinate (pass water).    3.  Headache for over 24 hours.    To contact a doctor, call Dr Traore at [phone] (clinic) or:      [phone] and ask for the resident on call for Gastroenterology (answered 24 hours a day)      Emergency Department:  [facility]: [phone]       (TTY for hearing impaired: [phone])

## 2019-05-13 NOTE — ANESTHESIA CARE TRANSFER NOTE
Patient: Sandro Yoon    Procedure(s):  Diagnostic Upper Endoscopic Ultrasound    Diagnosis: Abnormal Liver Functions  Diagnosis Additional Information: No value filed.    Anesthesia Type:   General     Note:  Airway :Face Mask  Patient transferred to:PACU  Handoff Report: Identifed the Patient, Identified the Reponsible Provider, Reviewed the pertinent medical history, Discussed the surgical course, Reviewed Intra-OP anesthesia mangement and issues during anesthesia, Set expectations for post-procedure period and Allowed opportunity for questions and acknowledgement of understanding      Vitals: (Last set prior to Anesthesia Care Transfer)    CRNA VITALS  5/13/2019 1328 - 5/13/2019 1358      5/13/2019             Resp Rate (observed):  1  (Abnormal)                 Electronically Signed By: SUMAYA Contreras CRNA  May 13, 2019  1:58 PM

## 2019-05-13 NOTE — ANESTHESIA POSTPROCEDURE EVALUATION
Anesthesia POST Procedure Evaluation    Patient: Sandro Yoon   MRN:     [id number] Gender:   male   Age:    74 year old :      1945        Preoperative Diagnosis: Abnormal Liver Functions   Procedure(s):  Diagnostic Upper Endoscopic Ultrasound   Postop Comments: No value filed.       Anesthesia Type:  Not documented  General    Reportable Event: NO     PAIN: Uncomplicated   Sign Out status: Comfortable, Well controlled pain     PONV: No PONV   Sign Out status:  No Nausea or Vomiting     Neuro/Psych: Uneventful perioperative course   Sign Out Status: Preoperative baseline; Age appropriate mentation     Airway/Resp.: Uneventful perioperative course   Sign Out Status: Non labored breathing, age appropriate RR; Resp. Status within EXPECTED Parameters     CV: Uneventful perioperative course   Sign Out status: Appropriate BP and perfusion indices; Appropriate HR/Rhythm     Disposition:   Sign Out in:  PACU  Disposition:  Phase II; Home  Recovery Course: Uneventful  Follow-Up: Not required           Last Anesthesia Record Vitals:  CRNA VITALS  2019 1328 - 2019 1428      2019             Resp Rate (observed):  16          Last PACU Vitals:  Vitals Value Taken Time   /83 2019  2:50 PM   Temp 36.2  C (97.1  F) 2019  2:45 PM   Pulse 65 2019  2:50 PM   Resp 16 2019  2:45 PM   SpO2 98 % 2019  2:52 PM   Temp src     NIBP 145/92 2019  1:58 PM   Pulse     SpO2 100 % 2019  1:58 PM   Resp     Temp     Ht Rate 86 2019  1:58 PM   Temp 2     Vitals shown include unvalidated device data.      Electronically Signed By: Duane F. Szczepanski, MD, May 13, 2019, 2:53 PM

## 2019-05-16 ENCOUNTER — CARE COORDINATION (OUTPATIENT)
Dept: GASTROENTEROLOGY | Facility: CLINIC | Age: 74
End: 2019-05-16

## 2019-05-16 DIAGNOSIS — R79.89 ABNORMAL LFTS (LIVER FUNCTION TESTS): Primary | ICD-10-CM

## 2019-05-16 NOTE — PROGRESS NOTES
Post EUS (5/13/19) with Dr. Traore: Follow-up    Post procedure recommendations: Interval MRI in 2 years to ensure stable cyst size    - Follow up with your established providers as scheduled    - Plan for ERCP and sludge removal as well as tandem liver biopsy IF liver studies remain elevated in late August once he can safely hold his antithrombotic therapy    Orders placed: None at this time. Reminder sent to [place].     Patient states he is feeling good. Denies nausea, vomiting, fever and abdominal pain. Eating and drinking PO with no issues.     Clinic contact and scheduling numbers verified for future questions/concerns.    RUDY Masters Dr., Dr. Traore, & Dr. Rivero  [facility]  [phone]

## 2019-05-20 DIAGNOSIS — R74.01 ELEVATED TRANSAMINASE LEVEL: ICD-10-CM

## 2019-05-20 DIAGNOSIS — R79.89 ABNORMAL LFTS (LIVER FUNCTION TESTS): ICD-10-CM

## 2019-05-20 LAB
ALBUMIN SERPL-MCNC: 4.2 G/DL (ref 3.5–5.7)
ALP SERPL-CCNC: 444 U/L (ref 34–104)
ALT SERPL W P-5'-P-CCNC: 99 U/L (ref 7–52)
AST SERPL W P-5'-P-CCNC: 69 U/L (ref 13–39)
BILIRUB DIRECT SERPL-MCNC: 0.3 MG/DL (ref 0–0.2)
BILIRUB SERPL-MCNC: 1.4 MG/DL (ref 0.3–1)
INR PPP: 0.95 (ref 0–1.3)
PROT SERPL-MCNC: 6.6 G/DL (ref 6.4–8.9)

## 2019-05-20 PROCEDURE — 80076 HEPATIC FUNCTION PANEL: CPT | Performed by: PHYSICIAN ASSISTANT

## 2019-05-20 PROCEDURE — 85610 PROTHROMBIN TIME: CPT | Performed by: PHYSICIAN ASSISTANT

## 2019-05-20 PROCEDURE — 36415 COLL VENOUS BLD VENIPUNCTURE: CPT | Performed by: PHYSICIAN ASSISTANT

## 2019-05-30 ENCOUNTER — OFFICE VISIT (OUTPATIENT)
Dept: PEDIATRICS | Facility: OTHER | Age: 74
End: 2019-05-30
Attending: INTERNAL MEDICINE
Payer: COMMERCIAL

## 2019-05-30 VITALS
WEIGHT: 147.8 LBS | DIASTOLIC BLOOD PRESSURE: 80 MMHG | RESPIRATION RATE: 16 BRPM | BODY MASS INDEX: 22.4 KG/M2 | SYSTOLIC BLOOD PRESSURE: 118 MMHG | HEIGHT: 68 IN | TEMPERATURE: 96.5 F | HEART RATE: 68 BPM

## 2019-05-30 DIAGNOSIS — Z95.820 S/P ANGIOPLASTY WITH STENT: ICD-10-CM

## 2019-05-30 DIAGNOSIS — R74.01 ELEVATED TRANSAMINASE LEVEL: ICD-10-CM

## 2019-05-30 DIAGNOSIS — R79.89 ABNORMAL LFTS (LIVER FUNCTION TESTS): ICD-10-CM

## 2019-05-30 DIAGNOSIS — R94.5 LIVER FUNCTION ABNORMALITY: ICD-10-CM

## 2019-05-30 DIAGNOSIS — I25.10 ASCVD (ARTERIOSCLEROTIC CARDIOVASCULAR DISEASE): ICD-10-CM

## 2019-05-30 DIAGNOSIS — D49.0 IPMN (INTRADUCTAL PAPILLARY MUCINOUS NEOPLASM): Primary | ICD-10-CM

## 2019-05-30 DIAGNOSIS — I20.89 ATYPICAL ANGINA (H): ICD-10-CM

## 2019-05-30 LAB
ALBUMIN SERPL-MCNC: 4.3 G/DL (ref 3.5–5.7)
ALP SERPL-CCNC: 399 U/L (ref 34–104)
ALT SERPL W P-5'-P-CCNC: 58 U/L (ref 7–52)
AST SERPL W P-5'-P-CCNC: 47 U/L (ref 13–39)
BILIRUB DIRECT SERPL-MCNC: 0.3 MG/DL (ref 0–0.2)
BILIRUB SERPL-MCNC: 1.6 MG/DL (ref 0.3–1)
INR PPP: 0.98 (ref 0–1.3)
PROT SERPL-MCNC: 6.8 G/DL (ref 6.4–8.9)

## 2019-05-30 PROCEDURE — 99214 OFFICE O/P EST MOD 30 MIN: CPT | Performed by: INTERNAL MEDICINE

## 2019-05-30 PROCEDURE — G0463 HOSPITAL OUTPT CLINIC VISIT: HCPCS

## 2019-05-30 PROCEDURE — 36415 COLL VENOUS BLD VENIPUNCTURE: CPT | Mod: ZL | Performed by: INTERNAL MEDICINE

## 2019-05-30 PROCEDURE — 80076 HEPATIC FUNCTION PANEL: CPT | Mod: ZL | Performed by: INTERNAL MEDICINE

## 2019-05-30 PROCEDURE — 85610 PROTHROMBIN TIME: CPT | Mod: ZL | Performed by: INTERNAL MEDICINE

## 2019-05-30 RX ORDER — NITROGLYCERIN 0.4 MG/1
TABLET SUBLINGUAL
Qty: 25 TABLET | Refills: 3 | Status: SHIPPED | OUTPATIENT
Start: 2019-05-30 | End: 2021-09-23

## 2019-05-30 RX ORDER — RAMIPRIL 2.5 MG/1
2.5 CAPSULE ORAL DAILY
Qty: 90 CAPSULE | Refills: 3 | Status: SHIPPED | OUTPATIENT
Start: 2019-05-30 | End: 2019-09-10

## 2019-05-30 RX ORDER — METOPROLOL SUCCINATE 50 MG/1
50 TABLET, EXTENDED RELEASE ORAL DAILY
Qty: 90 TABLET | Refills: 3 | Status: SHIPPED | OUTPATIENT
Start: 2019-05-30 | End: 2019-09-10

## 2019-05-30 RX ORDER — ATORVASTATIN CALCIUM 10 MG/1
10 TABLET, FILM COATED ORAL DAILY
Qty: 90 TABLET | Refills: 3 | Status: SHIPPED | OUTPATIENT
Start: 2019-05-30 | End: 2020-05-05

## 2019-05-30 ASSESSMENT — PAIN SCALES - GENERAL: PAINLEVEL: MODERATE PAIN (5)

## 2019-05-30 ASSESSMENT — ANXIETY QUESTIONNAIRES
2. NOT BEING ABLE TO STOP OR CONTROL WORRYING: NOT AT ALL
IF YOU CHECKED OFF ANY PROBLEMS ON THIS QUESTIONNAIRE, HOW DIFFICULT HAVE THESE PROBLEMS MADE IT FOR YOU TO DO YOUR WORK, TAKE CARE OF THINGS AT HOME, OR GET ALONG WITH OTHER PEOPLE: NOT DIFFICULT AT ALL
1. FEELING NERVOUS, ANXIOUS, OR ON EDGE: NOT AT ALL
3. WORRYING TOO MUCH ABOUT DIFFERENT THINGS: NOT AT ALL
GAD7 TOTAL SCORE: 0
5. BEING SO RESTLESS THAT IT IS HARD TO SIT STILL: NOT AT ALL
6. BECOMING EASILY ANNOYED OR IRRITABLE: NOT AT ALL
7. FEELING AFRAID AS IF SOMETHING AWFUL MIGHT HAPPEN: NOT AT ALL

## 2019-05-30 ASSESSMENT — MIFFLIN-ST. JEOR: SCORE: 1379.92

## 2019-05-30 ASSESSMENT — PATIENT HEALTH QUESTIONNAIRE - PHQ9
5. POOR APPETITE OR OVEREATING: NOT AT ALL
SUM OF ALL RESPONSES TO PHQ QUESTIONS 1-9: 0

## 2019-05-30 NOTE — NURSING NOTE
Patient presents to clinic for medication questions.  Would like to restart his atorvastatin.  Leesa Squires LPN ....................  5/30/2019   7:45 AM      No LMP for male patient.  Medication Reconciliation: complete    Leesa Squires LPN  5/30/2019 7:45 AM

## 2019-05-30 NOTE — PROGRESS NOTES
Subjective  Sandro Yoon is a 74 year old male who presents for medication management.  He thinks he had a cyst on his pancreas.  He supposed to get his liver tests repeated today.  Medical record reviewed.  After our last visit went to the [facility] for gastroenterology consultation.  Imaging revealed intraductal papillary mucinous neoplasm with multiple lesions up to 6 mm.  Subsequently underwent an EUS.  History of hyperlipidemia which is stable on Lipitor.  He would like a refill of nitroglycerin tablets but has not used one in many years.  History of hypertension which is stable on ramipril, metoprolol.  History of hypothyroidism which is stable on levothyroxine.  He continues on aspirin for prophylaxis.  History of atherosclerotic cardiovascular disease status post angioplasty with stent and continues on Brilinta.    Problem List/PMH: reviewed in EMR, and made relevant updates today.  Medications: reviewed in EMR, and made relevant updates today.  Allergies: reviewed in EMR, and made relevant updates today.    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    08/07:    Eight minutes Nick protocol stress echo normal.    Never smoked. No coffee.  One soda daily.  Rare alcohol.    Likes to cross country ski and bike. .     I reviewed social history and made relevant updates today.    Family Hx:   Family History   Problem Relation Age of Onset     Heart Disease Mother         Heart Disease,CAD     Heart Disease Father         Heart Disease,CAD     Prostate Cancer Father 80        Cancer-prostate     Heart Disease Brother         Heart Disease,3 of 4 have CAD     Prostate Cancer Brother 40         "Cancer-prostate     Other - See Comments Brother         CABG sdc young age     Prostate Cancer Brother 65        Cancer-prostate     Other - See Comments Brother         no heart disease     Other - See Comments Brother         CABG sdc young age 40s     Colon Cancer No family hx of         Cancer-colon     Anesthesia Reaction No family hx of         Anesthesia Problem     Blood Disease No family hx of         Blood Disease,no bleeding or clotting       Objective  Vitals: reviewed in EMR.  /80 (BP Location: Right arm, Patient Position: Sitting, Cuff Size: Adult Large)   Pulse 68   Temp 96.5  F (35.8  C) (Tympanic)   Resp 16   Ht 1.727 m (5' 8\")   Wt 67 kg (147 lb 12.8 oz)   BMI 22.47 kg/m      Gen: Pleasant male, NAD.  HEENT: MMM, no OP erythema.   Neck: Supple, no JVD, no bruits.  CV: RRR no m/r/g.   Pulm: CTAB no w/r/r  GI: Soft, NT, ND. No HSM. No masses. Bowel sounds present.  Neuro: Grossly intact  Msk: No lower extremity edema.  Skin: No concerning lesions.  Psychiatric: Normal affect and insight. Does not appear anxious or depressed.    Labs:  Lab Results   Component Value Date    WBC 6.5 05/13/2019    HGB 15.3 05/13/2019    HCT 49.5 05/13/2019     05/13/2019    CHOL 231 (H) 04/16/2019    TRIG 127 04/16/2019    HDL 97 (H) 04/16/2019    ALT 58 (H) 05/30/2019    AST 47 (H) 05/30/2019     05/13/2019    BUN 22 05/13/2019    CO2 25 05/13/2019    INR 0.98 05/30/2019         Assessment    ICD-10-CM    1. IPMN (intraductal papillary mucinous neoplasm) D49.0 Hepatic Function Panel     Hepatic Function Panel    Largest 6 mm, plan MRCP in 2021. Follows with [facility]   2. Atypical angina (H) I20.8 nitroGLYcerin (NITROSTAT) 0.4 MG sublingual tablet   3. ASCVD (arteriosclerotic cardiovascular disease) I25.10 metoprolol succinate ER (TOPROL-XL) 50 MG 24 hr tablet     ramipril (ALTACE) 2.5 MG capsule     atorvastatin (LIPITOR) 10 MG tablet   4. Elevated transaminase level R74.0 INR   5. Abnormal " LFTs (liver function tests) R94.5 INR   6. S/P angioplasty with stent Z95.9    7. Liver function abnormality K76.89      Orders Placed This Encounter   Procedures     Hepatic Function Panel       Appreciate the assistance of gastroenterology.  It remains unclear to me exactly why his bone specific alkaline phosphatase was elevated.  I will defer to gastroenterology.     Plan   -- Expected clinical course discussed   -- Medications and their side effects discussed   --Medications were reviewed, renewed   --Screening and surveillance labs were obtained as outlined above   --Appreciate assistance with gastroenterology, close follow-up as previously planned    Signed, Guero Turner MD  Internal Medicine & Pediatrics

## 2019-05-30 NOTE — Clinical Note
Hi there, I'm wondering what you guys made of the bone specific alk phos, and if more work-up is needed with that regard or if it can be explained by the IPMN.Thanks,Signed, Guero Turner MDInternal Medicine & Pediatrics

## 2019-05-31 ASSESSMENT — ANXIETY QUESTIONNAIRES: GAD7 TOTAL SCORE: 0

## 2019-06-03 ENCOUNTER — TELEPHONE (OUTPATIENT)
Dept: PEDIATRICS | Facility: OTHER | Age: 74
End: 2019-06-03

## 2019-06-03 DIAGNOSIS — R74.8 HIGH SERUM BONE-SPECIFIC ALKALINE PHOSPHATASE: Primary | ICD-10-CM

## 2019-06-03 NOTE — TELEPHONE ENCOUNTER
Received note from GI (See below) recommending further work-up.  Please call Mr. Yoon.      ICD-10-CM    1. High serum bone-specific alkaline phosphatase R79.89 NM Bone Scan Whole Body     DX Hip/Pelvis/Spine      Orders Placed This Encounter   Procedures     NM Bone Scan Whole Body     DX Hip/Pelvis/Spine     Signed, Guero Turner MD  Internal Medicine & Pediatrics            Previous message:  Kaylen Wise PA-C Murphy, Keith Patrick, MD             His elevated bone specific Alk Phos would not be explained by the IPMN.     Discussed this one of the hepatologist and it would be recommended to work the elevated bone Alk Phos with a Bone Scan in addition to a DEXA scan.     Thanks, Kaylen Wise PA-C

## 2019-06-04 ENCOUNTER — TELEPHONE (OUTPATIENT)
Dept: PEDIATRICS | Facility: OTHER | Age: 74
End: 2019-06-04

## 2019-06-04 NOTE — TELEPHONE ENCOUNTER
Received call from pt stating that they need to have provider call Medicare at  [phone] to get procedure code for the Dexa scan that would be covered.  Is denied because he has no fractures.    Leesa Squires LPN ....................  6/4/2019   2:45 PM

## 2019-06-04 NOTE — TELEPHONE ENCOUNTER
Patient notified of the following per Guero Turner MD.  Leesa Squires, RUDY ....................  6/4/2019   9:06 AM

## 2019-06-05 NOTE — TELEPHONE ENCOUNTER
This is a medicare patient hotline.  I'd be happy to speak MD to MD via direct line.    Signed, Guero Turner MD  Internal Medicine & Pediatrics

## 2019-06-06 NOTE — TELEPHONE ENCOUNTER
Tried calling the number provided and is a member number which asked for last 4 digits of social number.  Called patient and he will try get a provider number.  Leesa Squires LPN ....................  6/6/2019   12:55 PM

## 2019-06-07 ENCOUNTER — HOSPITAL ENCOUNTER (OUTPATIENT)
Dept: NUCLEAR MEDICINE | Facility: OTHER | Age: 74
Setting detail: NUCLEAR MEDICINE
Discharge: HOME | End: 2019-06-07
Attending: INTERNAL MEDICINE | Admitting: INTERNAL MEDICINE
Payer: MEDICARE

## 2019-06-07 ENCOUNTER — HOSPITAL ENCOUNTER (OUTPATIENT)
Dept: NUCLEAR MEDICINE | Facility: OTHER | Age: 74
End: 2019-06-07
Attending: INTERNAL MEDICINE
Payer: MEDICARE

## 2019-06-07 DIAGNOSIS — R74.8 HIGH SERUM BONE-SPECIFIC ALKALINE PHOSPHATASE: ICD-10-CM

## 2019-06-07 PROCEDURE — A9503 TC99M MEDRONATE: HCPCS | Performed by: INTERNAL MEDICINE

## 2019-06-07 PROCEDURE — 34300033 ZZH RX 343: Performed by: INTERNAL MEDICINE

## 2019-06-07 PROCEDURE — 78306 BONE IMAGING WHOLE BODY: CPT

## 2019-06-07 RX ORDER — TC 99M MEDRONATE 20 MG/10ML
25.1 INJECTION, POWDER, LYOPHILIZED, FOR SOLUTION INTRAVENOUS ONCE
Status: COMPLETED | OUTPATIENT
Start: 2019-06-07 | End: 2019-06-07

## 2019-06-07 RX ADMIN — TC 99M MEDRONATE 25.1 MCI.: 20 INJECTION, POWDER, LYOPHILIZED, FOR SOLUTION INTRAVENOUS at 08:05

## 2019-06-26 DIAGNOSIS — E03.9 ACQUIRED HYPOTHYROIDISM: Chronic | ICD-10-CM

## 2019-06-26 DIAGNOSIS — R74.01 ELEVATED TRANSAMINASE LEVEL: ICD-10-CM

## 2019-06-26 DIAGNOSIS — R79.89 ABNORMAL LFTS (LIVER FUNCTION TESTS): ICD-10-CM

## 2019-06-26 LAB
ALBUMIN SERPL-MCNC: 4.2 G/DL (ref 3.5–5.7)
ALP SERPL-CCNC: 396 U/L (ref 34–104)
ALT SERPL W P-5'-P-CCNC: 62 U/L (ref 7–52)
AST SERPL W P-5'-P-CCNC: 58 U/L (ref 13–39)
BILIRUB DIRECT SERPL-MCNC: 0.4 MG/DL (ref 0–0.2)
BILIRUB SERPL-MCNC: 1.5 MG/DL (ref 0.3–1)
INR PPP: 0.97 (ref 0–1.3)
PROT SERPL-MCNC: 6.9 G/DL (ref 6.4–8.9)
T4 FREE SERPL-MCNC: 1.16 NG/DL (ref 0.6–1.6)
TSH SERPL DL<=0.05 MIU/L-ACNC: 0.22 IU/ML (ref 0.34–5.6)

## 2019-06-26 PROCEDURE — 84443 ASSAY THYROID STIM HORMONE: CPT | Performed by: INTERNAL MEDICINE

## 2019-06-26 PROCEDURE — 84439 ASSAY OF FREE THYROXINE: CPT | Performed by: INTERNAL MEDICINE

## 2019-06-26 PROCEDURE — 80076 HEPATIC FUNCTION PANEL: CPT | Performed by: INTERNAL MEDICINE

## 2019-06-26 PROCEDURE — 85610 PROTHROMBIN TIME: CPT | Performed by: INTERNAL MEDICINE

## 2019-06-26 PROCEDURE — 36415 COLL VENOUS BLD VENIPUNCTURE: CPT | Performed by: INTERNAL MEDICINE

## 2019-07-24 DIAGNOSIS — R74.01 ELEVATED TRANSAMINASE LEVEL: ICD-10-CM

## 2019-07-24 DIAGNOSIS — R79.89 ABNORMAL LFTS (LIVER FUNCTION TESTS): ICD-10-CM

## 2019-07-24 LAB
ALBUMIN SERPL-MCNC: 4 G/DL (ref 3.5–5.7)
ALP SERPL-CCNC: 373 U/L (ref 34–104)
ALT SERPL W P-5'-P-CCNC: 64 U/L (ref 7–52)
AST SERPL W P-5'-P-CCNC: 57 U/L (ref 13–39)
BILIRUB DIRECT SERPL-MCNC: 0.3 MG/DL (ref 0–0.2)
BILIRUB SERPL-MCNC: 1.9 MG/DL (ref 0.3–1)
INR PPP: 0.99 (ref 0–1.3)
PROT SERPL-MCNC: 6.1 G/DL (ref 6.4–8.9)

## 2019-07-24 PROCEDURE — 80076 HEPATIC FUNCTION PANEL: CPT | Performed by: PHYSICIAN ASSISTANT

## 2019-07-24 PROCEDURE — 85610 PROTHROMBIN TIME: CPT | Performed by: PHYSICIAN ASSISTANT

## 2019-07-24 PROCEDURE — 36415 COLL VENOUS BLD VENIPUNCTURE: CPT | Performed by: PHYSICIAN ASSISTANT

## 2019-07-25 ENCOUNTER — HOSPITAL ENCOUNTER (OUTPATIENT)
Dept: BONE DENSITY | Facility: OTHER | Age: 74
Discharge: HOME | End: 2019-07-25
Attending: INTERNAL MEDICINE | Admitting: INTERNAL MEDICINE
Payer: MEDICARE

## 2019-07-25 DIAGNOSIS — R74.8 HIGH SERUM BONE-SPECIFIC ALKALINE PHOSPHATASE: ICD-10-CM

## 2019-07-25 PROCEDURE — 77080 DXA BONE DENSITY AXIAL: CPT

## 2019-08-05 DIAGNOSIS — E03.9 ACQUIRED HYPOTHYROIDISM: Chronic | ICD-10-CM

## 2019-08-05 RX ORDER — LEVOTHYROXINE SODIUM 88 UG/1
88 TABLET ORAL DAILY
Qty: 90 TABLET | Refills: 2 | Status: SHIPPED | OUTPATIENT
Start: 2019-08-05 | End: 2020-05-05

## 2019-08-05 NOTE — TELEPHONE ENCOUNTER
Refill request for: Levothyroxine 88 mcg tablets   From: [facility]   Rx written date: 04/16/2019 #30 with 11 refills  LOV: 04/16/2019 with JAN  Next appt: none noted  Protocol: Thyroid Protocol Failed   Normal TSH on file in past 12 months       Refill request is for 90-day supply instead of 30-day. Prescription approved per [facility] Refill Protocol. Luz Maria Cooper RN on 8/5/2019 at 3:07 PM

## 2019-08-22 DIAGNOSIS — R74.01 ELEVATED TRANSAMINASE LEVEL: ICD-10-CM

## 2019-08-22 DIAGNOSIS — R79.89 ABNORMAL LFTS (LIVER FUNCTION TESTS): ICD-10-CM

## 2019-08-22 LAB
ALBUMIN SERPL-MCNC: 4.2 G/DL (ref 3.5–5.7)
ALP SERPL-CCNC: 323 U/L (ref 34–104)
ALT SERPL W P-5'-P-CCNC: 55 U/L (ref 7–52)
AST SERPL W P-5'-P-CCNC: 47 U/L (ref 13–39)
BILIRUB DIRECT SERPL-MCNC: 0.3 MG/DL (ref 0–0.2)
BILIRUB SERPL-MCNC: 2.6 MG/DL (ref 0.3–1)
INR PPP: 1.05 (ref 0–1.3)
PROT SERPL-MCNC: 6.7 G/DL (ref 6.4–8.9)

## 2019-08-22 PROCEDURE — 80076 HEPATIC FUNCTION PANEL: CPT | Performed by: PHYSICIAN ASSISTANT

## 2019-08-22 PROCEDURE — 85610 PROTHROMBIN TIME: CPT | Performed by: PHYSICIAN ASSISTANT

## 2019-08-22 PROCEDURE — 83516 IMMUNOASSAY NONANTIBODY: CPT | Performed by: PHYSICIAN ASSISTANT

## 2019-08-22 PROCEDURE — 36415 COLL VENOUS BLD VENIPUNCTURE: CPT | Performed by: PHYSICIAN ASSISTANT

## 2019-08-22 NOTE — PROGRESS NOTES
Spoke to Suzanne and she advised that Jon will find out if he can go off the Brilinta on 8/28/19. She will call my direct line and let me know.     RUDY Masters Dr., Dr. Traore, & Dr. Rivero  [facility]  [phone]

## 2019-08-23 LAB — MITOCHONDRIA M2 IGG SER-ACNC: 1 U/ML

## 2019-08-27 ENCOUNTER — TELEPHONE (OUTPATIENT)
Dept: GASTROENTEROLOGY | Facility: CLINIC | Age: 74
End: 2019-08-27

## 2019-08-27 NOTE — TELEPHONE ENCOUNTER
Spoke to patient's wife Suzanne  in regards to scheduled procedure. Informed Suzanne the patient is scheduled with Dr. Traore on 9/23/19. Informed Suzanne the patient will need an updated pre-op physical within 30 days of his procedure.She stated the patient is going to have this done locally. Informed her the will need a  and someone to monitor him for 24 hours after the procedure. Informed her all scheduling details will be sent to the patient's MyChart.     8/27/19 141pm

## 2019-08-28 ENCOUNTER — TRANSFERRED RECORDS (OUTPATIENT)
Dept: HEALTH INFORMATION MANAGEMENT | Facility: OTHER | Age: 74
End: 2019-08-28

## 2019-08-29 ENCOUNTER — TELEPHONE (OUTPATIENT)
Dept: INTERNAL MEDICINE | Facility: OTHER | Age: 74
End: 2019-08-29

## 2019-08-29 ENCOUNTER — CARE COORDINATION (OUTPATIENT)
Dept: GASTROENTEROLOGY | Facility: CLINIC | Age: 74
End: 2019-08-29

## 2019-08-29 NOTE — TELEPHONE ENCOUNTER
After verification, Patient says he is having ERCP surgery done on 9/23/19 and says he needs an EKG when he comes in for pre -op on 10/1/19.  Kenya Lopez LPN ....................8/29/2019   4:03 PM

## 2019-09-10 ENCOUNTER — OFFICE VISIT (OUTPATIENT)
Dept: INTERNAL MEDICINE | Facility: OTHER | Age: 74
End: 2019-09-10
Attending: INTERNAL MEDICINE
Payer: COMMERCIAL

## 2019-09-10 VITALS
OXYGEN SATURATION: 97 % | TEMPERATURE: 96.8 F | WEIGHT: 150 LBS | SYSTOLIC BLOOD PRESSURE: 100 MMHG | DIASTOLIC BLOOD PRESSURE: 68 MMHG | HEIGHT: 68 IN | RESPIRATION RATE: 16 BRPM | HEART RATE: 82 BPM | BODY MASS INDEX: 22.73 KG/M2

## 2019-09-10 DIAGNOSIS — R93.2 COMMON BILE DUCT FILLING DEFECT, NON-SPECIFIC: ICD-10-CM

## 2019-09-10 DIAGNOSIS — Z01.818 PREOPERATIVE EXAMINATION: ICD-10-CM

## 2019-09-10 DIAGNOSIS — I10 ESSENTIAL HYPERTENSION: ICD-10-CM

## 2019-09-10 DIAGNOSIS — E78.2 MIXED HYPERLIPIDEMIA: ICD-10-CM

## 2019-09-10 DIAGNOSIS — J30.9 ALLERGIC RHINITIS, UNSPECIFIED SEASONALITY, UNSPECIFIED TRIGGER: ICD-10-CM

## 2019-09-10 DIAGNOSIS — I25.118 CORONARY ARTERY DISEASE OF NATIVE ARTERY OF NATIVE HEART WITH STABLE ANGINA PECTORIS (H): Primary | ICD-10-CM

## 2019-09-10 DIAGNOSIS — E03.9 ACQUIRED HYPOTHYROIDISM: Chronic | ICD-10-CM

## 2019-09-10 DIAGNOSIS — Q45.3 PANCREATIC ABNORMALITY: ICD-10-CM

## 2019-09-10 PROBLEM — I25.10 ATHEROSCLEROSIS OF NATIVE CORONARY ARTERY OF NATIVE HEART WITHOUT ANGINA PECTORIS: Status: RESOLVED | Noted: 2018-01-29 | Resolved: 2019-09-10

## 2019-09-10 PROBLEM — Z79.01 ANTICOAGULANT LONG-TERM USE: Status: RESOLVED | Noted: 2019-01-09 | Resolved: 2019-09-10

## 2019-09-10 LAB
ANION GAP SERPL CALCULATED.3IONS-SCNC: 4 MMOL/L (ref 3–14)
BUN SERPL-MCNC: 23 MG/DL (ref 7–25)
CALCIUM SERPL-MCNC: 9.8 MG/DL (ref 8.6–10.3)
CHLORIDE SERPL-SCNC: 104 MMOL/L (ref 98–107)
CO2 SERPL-SCNC: 32 MMOL/L (ref 21–31)
CREAT SERPL-MCNC: 1.5 MG/DL (ref 0.7–1.3)
ERYTHROCYTE [DISTWIDTH] IN BLOOD BY AUTOMATED COUNT: 13.5 % (ref 10–15)
GFR SERPL CREATININE-BSD FRML MDRD: 46 ML/MIN/{1.73_M2}
GLUCOSE SERPL-MCNC: 92 MG/DL (ref 70–105)
HCT VFR BLD AUTO: 46.5 % (ref 40–53)
HGB BLD-MCNC: 15.4 G/DL (ref 13.3–17.7)
MCH RBC QN AUTO: 33 PG (ref 26.5–33)
MCHC RBC AUTO-ENTMCNC: 33.1 G/DL (ref 31.5–36.5)
MCV RBC AUTO: 100 FL (ref 78–100)
PLATELET # BLD AUTO: 238 10E9/L (ref 150–450)
POTASSIUM SERPL-SCNC: 4.2 MMOL/L (ref 3.5–5.1)
RBC # BLD AUTO: 4.66 10E12/L (ref 4.4–5.9)
SODIUM SERPL-SCNC: 140 MMOL/L (ref 134–144)
WBC # BLD AUTO: 7.4 10E9/L (ref 4–11)

## 2019-09-10 PROCEDURE — 99215 OFFICE O/P EST HI 40 MIN: CPT | Performed by: INTERNAL MEDICINE

## 2019-09-10 PROCEDURE — 36415 COLL VENOUS BLD VENIPUNCTURE: CPT | Mod: ZL | Performed by: INTERNAL MEDICINE

## 2019-09-10 PROCEDURE — G0463 HOSPITAL OUTPT CLINIC VISIT: HCPCS

## 2019-09-10 PROCEDURE — 93005 ELECTROCARDIOGRAM TRACING: CPT

## 2019-09-10 PROCEDURE — 85027 COMPLETE CBC AUTOMATED: CPT | Mod: ZL | Performed by: INTERNAL MEDICINE

## 2019-09-10 PROCEDURE — G0463 HOSPITAL OUTPT CLINIC VISIT: HCPCS | Mod: 25

## 2019-09-10 PROCEDURE — 93010 ELECTROCARDIOGRAM REPORT: CPT | Performed by: INTERNAL MEDICINE

## 2019-09-10 PROCEDURE — 80048 BASIC METABOLIC PNL TOTAL CA: CPT | Mod: ZL | Performed by: INTERNAL MEDICINE

## 2019-09-10 RX ORDER — ASPIRIN 81 MG/1
81 TABLET, CHEWABLE ORAL
COMMUNITY
Start: 2019-09-10 | End: 2023-09-11

## 2019-09-10 RX ORDER — FLUOROURACIL 50 MG/G
CREAM TOPICAL AT BEDTIME
COMMUNITY
Start: 2019-09-10

## 2019-09-10 RX ORDER — METOPROLOL SUCCINATE 50 MG/1
50 TABLET, EXTENDED RELEASE ORAL DAILY
Qty: 90 TABLET | Refills: 3 | COMMUNITY
Start: 2019-09-10 | End: 2020-07-29

## 2019-09-10 RX ORDER — RAMIPRIL 2.5 MG/1
2.5 CAPSULE ORAL DAILY
Qty: 90 CAPSULE | Refills: 3 | COMMUNITY
Start: 2019-09-10 | End: 2020-08-18

## 2019-09-10 RX ORDER — FLUTICASONE PROPIONATE 50 MCG
2 SPRAY, SUSPENSION (ML) NASAL DAILY
Qty: 48 G | Refills: 3 | COMMUNITY
Start: 2019-09-10 | End: 2023-05-25

## 2019-09-10 ASSESSMENT — ENCOUNTER SYMPTOMS
TROUBLE SWALLOWING: 0
BLOOD IN STOOL: 0
APPETITE CHANGE: 0
NAUSEA: 0
FREQUENCY: 0
DIZZINESS: 0
AGITATION: 0
SEIZURES: 0
ADENOPATHY: 0
LIGHT-HEADEDNESS: 0
FATIGUE: 0
CONSTIPATION: 0
EYE REDNESS: 0
FEVER: 0
PALPITATIONS: 0
DIARRHEA: 0
DYSURIA: 0
FLANK PAIN: 0
ABDOMINAL PAIN: 0
CHILLS: 0
RHINORRHEA: 0
SINUS PAIN: 0
HEMATURIA: 0
NECK STIFFNESS: 0
SLEEP DISTURBANCE: 0
VOICE CHANGE: 0
SINUS PRESSURE: 0
ABDOMINAL DISTENTION: 0
HEADACHES: 0
HALLUCINATIONS: 0
DIFFICULTY URINATING: 0
UNEXPECTED WEIGHT CHANGE: 0
WOUND: 0
WEAKNESS: 0
SORE THROAT: 0
SPEECH DIFFICULTY: 0
CONFUSION: 0
NECK PAIN: 0
BRUISES/BLEEDS EASILY: 0
VOMITING: 0
NUMBNESS: 0
SHORTNESS OF BREATH: 0
DIAPHORESIS: 0
COUGH: 0
EYE PAIN: 0
WHEEZING: 0
TREMORS: 0
JOINT SWELLING: 0
BACK PAIN: 0
NERVOUS/ANXIOUS: 0
CHEST TIGHTNESS: 0

## 2019-09-10 ASSESSMENT — MIFFLIN-ST. JEOR: SCORE: 1393.87

## 2019-09-10 NOTE — PROGRESS NOTES
Chief Complaint:  This patient is here for a preop consultation and clearance.    HPI: Preoperative consultation and clearance is requested by Dr. Traore.  This patient is scheduled to have ERCP at the [facility] on 23rd.  This is being done because of debris present in his common bile duct associated with abnormal pancreas findings on endoscopic ultrasound.    The patient has a long-standing history of coronary artery disease and has had stenting numerous times in the past.  His most recent stenting was done in August 2018.  He is on dual antiplatelet therapy because of his numerous stents.  He recently saw his cardiologist who advised that the patient should remain on aspirin therapy throughout the procedure but would be able to stop the Brilinta therapy for 5 days prior to the procedure.  The patient is currently asymptomatic in regards to his coronary artery disease.  He does not have any anginal symptoms.    He has a history of hypothyroidism.  He is on replacement therapy without signs or symptoms of under or over replacement.  He has treated hypertension and has good control of his blood pressure.  He has seasonal allergies which are controlled.  He occasionally has some mild reflux disease.    He currently feels well.  He denies any acute illnesses including fevers or chills.  He denies shortness of breath or cough, other than a chronic cough that he has with his allergies.  He denies chest pain, palpitations or chest pressure.  He denies any GI or  symptoms.    Medications are reconciled.  Past medical history, past surgical history, family history and social history are all reviewed and updated.  He has never had a blood transfusion.  He has never had a bleeding diathesis.  Although he has never had an anesthesia complication he tells me that he is sensitive to anesthesia.    Past Medical History:   Diagnosis Date     Coronary artery disease      Hypertension      Hypothyroid      Stented  coronary artery     x4     Transient global amnesia     hx of       Past Surgical History:   Procedure Laterality Date     APPENDECTOMY OPEN       COLONOSCOPY  04/20/2009 2009,Due 2019     CYSTOSCOPY, TRANSURETHRAL RESECTION (TUR) TUMOR BLADDER, COMBINED  2015     ENDOSCOPIC ULTRASOUND UPPER GASTROINTESTINAL TRACT (GI) N/A 5/13/2019    Procedure: Diagnostic Upper Endoscopic Ultrasound;  Surgeon: Evan Traore MD;  Location: [facility] OR     EXTRACAPSULAR CATARACT EXTRATION WITH INTRAOCULAR LENS IMPLANT Bilateral      HERNIORRHAPHY INGUINAL Right 1/9/2019    Procedure: Repair Right Inguinal Hernia w/ Mesh;  Surgeon: Aurora Verdin MD;  Location: [facility] OR     LAPAROSCOPIC CHOLECYSTECTOMY      2007,with cholangiogram     SIGMOIDOSCOPY FLEXIBLE      1998     STENT  2005    90% LAD & 99% SECOND DIAGONAL     STENT  2003    LAD     STENT  2018    LAD times 2     TONSILLECTOMY      No Comments Provided       Current Outpatient Medications   Medication Sig Dispense Refill     aspirin (ASA) 81 MG chewable tablet Take 1 tablet (81 mg) by mouth daily with food       atorvastatin (LIPITOR) 10 MG tablet Take 1 tablet (10 mg) by mouth daily 90 tablet 3     BRILINTA 90 MG tablet Take 90 mg by mouth 2 times daily  2     docusate sodium (COLACE) 100 MG capsule Take 100 mg by mouth 2 times daily as needed for constipation       famotidine (PEPCID) 10 MG tablet Take 10 mg by mouth 2 times daily as needed       fluorouracil (EFUDEX) 5 % external cream Apply topically At Bedtime       fluticasone (FLONASE) 50 MCG/ACT nasal spray Spray 2 sprays in nostril daily 48 g 3     levothyroxine (SYNTHROID/LEVOTHROID) 88 MCG tablet TAKE 1 TABLET (88 MCG) BY MOUTH DAILY 90 tablet 2     loratadine (CLARITIN) 10 MG tablet Take 10 mg by mouth daily as needed        metoprolol succinate ER (TOPROL-XL) 50 MG 24 hr tablet Take 1 tablet (50 mg) by mouth daily 90 tablet 3     nitroGLYcerin (NITROSTAT) 0.4 MG sublingual tablet For chest pain place 1 tablet  under the tongue every 5 minutes for 3 doses. If symptoms persist 5 minutes after 1st dose call 911. 25 tablet 3     ramipril (ALTACE) 2.5 MG capsule Take 1 capsule (2.5 mg) by mouth daily 90 capsule 3       Allergies   Allergen Reactions     Co Q 10 [Coenzyme Q10] Other (See Comments)     Encephalopathy, transaminitis.     Hydrocodone-Acetaminophen Nausea and Vomiting     Simvastatin Rash     Thimerosal Swelling     Diatrizoate Rash     Plavix [Clopidogrel] Rash       Family History   Problem Relation Age of Onset     Heart Disease Mother          at 94     Heart Disease Father      Prostate Cancer Father 80     Other - See Comments Brother         Sudden cardiac death     Heart Disease Brother      Prostate Cancer Brother 40     Other - See Comments Brother         CABG sdc young age     Prostate Cancer Brother 65     Colon Cancer No family hx of         Cancer-colon     Anesthesia Reaction No family hx of         Anesthesia Problem     Blood Disease No family hx of         Blood Disease,no bleeding or clotting       Social History     Socioeconomic History     Marital status:      Spouse name: Not on file     Number of children: Not on file     Years of education: Not on file     Highest education level: Not on file   Occupational History     Not on file   Social Needs     Financial resource strain: Not on file     Food insecurity:     Worry: Not on file     Inability: Not on file     Transportation needs:     Medical: Not on file     Non-medical: Not on file   Tobacco Use     Smoking status: Never Smoker     Smokeless tobacco: Never Used   Substance and Sexual Activity     Alcohol use: Not Currently     Frequency: Never     Comment: zero use since 2018     Drug use: No     Comment: Drug use: No     Sexual activity: Yes   Lifestyle     Physical activity:     Days per week: Not on file     Minutes per session: Not on file     Stress: Not on file   Relationships     Social connections:     Talks on  phone: Not on file     Gets together: Not on file     Attends Restorationist service: Not on file     Active member of club or organization: Not on file     Attends meetings of clubs or organizations: Not on file     Relationship status: Not on file     Intimate partner violence:     Fear of current or ex partner: Not on file     Emotionally abused: Not on file     Physically abused: Not on file     Forced sexual activity: Not on file   Other Topics Concern     Parent/sibling w/ CABG, MI or angioplasty before 65F 55M? 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  Likes to cross country [email or web address] and bike.  .  Lives in town.       Review of Systems   Constitutional: Negative for appetite change, chills, diaphoresis, fatigue, fever and unexpected weight change.   HENT: Negative for ear pain, rhinorrhea, sinus pressure, sinus pain, sore throat, trouble swallowing and voice change.    Eyes: Negative for pain, redness and visual disturbance.   Respiratory: Negative for cough, chest tightness, shortness of breath and wheezing.    Cardiovascular: Negative for chest pain, palpitations and leg swelling.   Gastrointestinal: Negative for abdominal distention, abdominal pain, blood in stool, constipation, diarrhea, nausea and vomiting.   Endocrine: Negative for cold intolerance and heat intolerance.   Genitourinary: Negative for difficulty urinating, dysuria, flank pain, frequency and hematuria.   Musculoskeletal: Negative for back pain, joint swelling, neck pain and neck stiffness.   Skin: Negative for rash and wound.   Allergic/Immunologic: Negative for immunocompromised state.   Neurological: Negative for dizziness, tremors, seizures, syncope, speech difficulty, weakness, light-headedness, numbness and headaches.   Hematological: Negative for adenopathy. Does not bruise/bleed easily.   Psychiatric/Behavioral: Negative for agitation, behavioral problems, confusion, hallucinations and sleep disturbance.  The patient is not nervous/anxious.        Physical Exam   Constitutional: He is oriented to person, place, and time. He appears well-developed and well-nourished. No distress.   HENT:   Head: Normocephalic.   Right Ear: External ear normal.   Left Ear: External ear normal.   Nose: Nose normal.   Mouth/Throat: Oropharynx is clear and moist. No oropharyngeal exudate.   Eyes: Pupils are equal, round, and reactive to light. Conjunctivae are normal.   Neck: Normal range of motion. Neck supple. Normal carotid pulses and no JVD present. Carotid bruit is not present. No tracheal deviation present. No thyromegaly present.   Cardiovascular: Normal rate, regular rhythm and normal heart sounds. Exam reveals no gallop and no friction rub.   No murmur heard.  Pulmonary/Chest: Effort normal and breath sounds normal. No stridor. No respiratory distress. He has no wheezes. He has no rales.   Abdominal: Soft. Bowel sounds are normal. He exhibits no distension and no mass. There is no tenderness. There is no rebound and no guarding. No hernia.   Musculoskeletal: Normal range of motion. He exhibits no edema.   Lymphadenopathy:     He has no cervical adenopathy.   Neurological: He is alert and oriented to person, place, and time. He displays normal reflexes. No cranial nerve deficit or sensory deficit. He exhibits normal muscle tone. Coordination normal.   Skin: Skin is warm and dry. No rash noted. He is not diaphoretic.   Psychiatric: He has a normal mood and affect.   Nursing note and vitals reviewed.    Results for orders placed or performed in visit on 09/10/19   Basic Metabolic Panel   Result Value Ref Range    Sodium 140 134 - 144 mmol/L    Potassium 4.2 3.5 - 5.1 mmol/L    Chloride 104 98 - 107 mmol/L    Carbon Dioxide 32 (H) 21 - 31 mmol/L    Anion Gap 4 3 - 14 mmol/L    Glucose 92 70 - 105 mg/dL    Urea Nitrogen 23 7 - 25 mg/dL    Creatinine 1.50 (H) 0.70 - 1.30 mg/dL    GFR Estimate 46 (L) >60 mL/min/[1.73_m2]    GFR Estimate  If Black 55 (L) >60 mL/min/[1.73_m2]    Calcium 9.8 8.6 - 10.3 mg/dL   CBC W PLT No Diff   Result Value Ref Range    WBC 7.4 4.0 - 11.0 10e9/L    RBC Count 4.66 4.4 - 5.9 10e12/L    Hemoglobin 15.4 13.3 - 17.7 g/dL    Hematocrit 46.5 40.0 - 53.0 %     78 - 100 fl    MCH 33.0 26.5 - 33.0 pg    MCHC 33.1 31.5 - 36.5 g/dL    RDW 13.5 10.0 - 15.0 %    Platelet Count 238 150 - 450 10e9/L   EKG 12-lead complete w/read - Clinics    Impression    Normal EKG with a rate of 68.  Santo Melton MD         Assessment:      ICD-10-CM    1. Coronary artery disease of native artery of native heart with stable angina pectoris (H) I25.118 EKG 12-lead complete w/read - Clinics   2. Allergic rhinitis, unspecified seasonality, unspecified trigger J30.9 fluticasone (FLONASE) 50 MCG/ACT nasal spray   3. Acquired hypothyroidism E03.9    4. Mixed hyperlipidemia E78.2    5. Essential hypertension I10    6. Preoperative examination Z01.818 CBC W PLT No Diff     Basic Metabolic Panel     Basic Metabolic Panel     CBC W PLT No Diff   7. Common bile duct filling defect, non-specific R93.2    8. Pancreatic abnormality Q45.3         Plan: This patient is scheduled for ERCP.  His exam today is unremarkable.  EKG is normal.  His lab is acceptable noting that he does have some mild elevation in his creatinine..  He is okay for this planned procedure without contraindication.  He will need to hold his Brilinta for 5 days prior to the procedure, other medications will continue up until the time of the procedure.  Usual medications including his Brilinta will be reinstituted postoperatively.

## 2019-09-10 NOTE — NURSING NOTE
"The patient is here today to have a preop exam for surgery on 9- [facility]in [facility]  Bia Wheat LPN on 9/10/2019 at 12:55 PM  Chief Complaint   Patient presents with     Pre-Op Exam       Initial /68 (BP Location: Right arm, Patient Position: Sitting, Cuff Size: Adult Regular)   Pulse 82   Temp 96.8  F (36  C) (Tympanic)   Resp 16   Ht 1.734 m (5' 8.25\")   Wt 68 kg (150 lb)   SpO2 97%   BMI 22.64 kg/m   Estimated body mass index is 22.64 kg/m  as calculated from the following:    Height as of this encounter: 1.734 m (5' 8.25\").    Weight as of this encounter: 68 kg (150 lb).  Medication Reconciliation: complete    Bia Wheat LPN    "

## 2019-09-10 NOTE — LETTER
September 10, 2019      Sandro Yoon  [address] [facility] [address]        Dear Jon,    Below are the results of your recent labs:    Results for orders placed or performed in visit on 09/10/19   Basic Metabolic Panel   Result Value Ref Range    Sodium 140 134 - 144 mmol/L    Potassium 4.2 3.5 - 5.1 mmol/L    Chloride 104 98 - 107 mmol/L    Carbon Dioxide 32 (H) 21 - 31 mmol/L    Anion Gap 4 3 - 14 mmol/L    Glucose 92 70 - 105 mg/dL    Urea Nitrogen 23 7 - 25 mg/dL    Creatinine 1.50 (H) 0.70 - 1.30 mg/dL    GFR Estimate 46 (L) >60 mL/min/[1.73_m2]    GFR Estimate If Black 55 (L) >60 mL/min/[1.73_m2]    Calcium 9.8 8.6 - 10.3 mg/dL   CBC W PLT No Diff   Result Value Ref Range    WBC 7.4 4.0 - 11.0 10e9/L    RBC Count 4.66 4.4 - 5.9 10e12/L    Hemoglobin 15.4 13.3 - 17.7 g/dL    Hematocrit 46.5 40.0 - 53.0 %     78 - 100 fl    MCH 33.0 26.5 - 33.0 pg    MCHC 33.1 31.5 - 36.5 g/dL    RDW 13.5 10.0 - 15.0 %    Platelet Count 238 150 - 450 10e9/L   EKG 12-lead complete w/read - Clinics    Impression    Normal EKG with a rate of 68.  Santo Melton MD          Your creatinine, which is a test of your kidney function, is a little bit elevated.  This is new.  It may be associated with your pancreas and liver associated problems.  I would recommend that you maintain adequate hydration and sometime after your procedure that you return and have your kidney function rechecked.    Sincerely,        Santo Melton MD  Internal Medicine  [facility]

## 2019-09-19 ENCOUNTER — PATIENT OUTREACH (OUTPATIENT)
Dept: GASTROENTEROLOGY | Facility: CLINIC | Age: 74
End: 2019-09-19

## 2019-09-19 NOTE — PROGRESS NOTES
Patient called with questions regarding procedure Monday, will liver biopsy be performed. Reviewed procedure note:    Plan for ERCP and sludge removal as well as tandem                        liver biopsy IF liver studies remain elevated in late                        August once he can safely hold his antithrombotic therapy                        - The findings and recommendations were discussed with                        the patient and their family     Forwarded to Dr. Traore for clarification/reassurance as patient is currently holding his Brilinta.    Angélica Cunningham RN Care Coordinator

## 2019-09-23 ENCOUNTER — HOSPITAL ENCOUNTER (OUTPATIENT)
Facility: CLINIC | Age: 74
Discharge: HOME | End: 2019-09-23
Attending: INTERNAL MEDICINE | Admitting: INTERNAL MEDICINE
Payer: MEDICARE

## 2019-09-23 ENCOUNTER — ANESTHESIA EVENT (OUTPATIENT)
Dept: SURGERY | Facility: CLINIC | Age: 74
End: 2019-09-23
Payer: MEDICARE

## 2019-09-23 ENCOUNTER — APPOINTMENT (OUTPATIENT)
Dept: GENERAL RADIOLOGY | Facility: CLINIC | Age: 74
End: 2019-09-23
Attending: INTERNAL MEDICINE
Payer: MEDICARE

## 2019-09-23 ENCOUNTER — ANESTHESIA (OUTPATIENT)
Dept: SURGERY | Facility: CLINIC | Age: 74
End: 2019-09-23
Payer: MEDICARE

## 2019-09-23 VITALS
HEART RATE: 68 BPM | OXYGEN SATURATION: 99 % | WEIGHT: 151.68 LBS | BODY MASS INDEX: 22.99 KG/M2 | RESPIRATION RATE: 16 BRPM | DIASTOLIC BLOOD PRESSURE: 80 MMHG | SYSTOLIC BLOOD PRESSURE: 139 MMHG | HEIGHT: 68 IN | TEMPERATURE: 98 F

## 2019-09-23 LAB
ALBUMIN SERPL-MCNC: 3.4 G/DL (ref 3.4–5)
ALP SERPL-CCNC: 291 U/L (ref 40–150)
ALT SERPL W P-5'-P-CCNC: 55 U/L (ref 0–70)
AMYLASE SERPL-CCNC: 79 U/L (ref 30–110)
AMYLASE SERPL-CCNC: 94 U/L (ref 30–110)
ANION GAP SERPL CALCULATED.3IONS-SCNC: 6 MMOL/L (ref 3–14)
AST SERPL W P-5'-P-CCNC: 43 U/L (ref 0–45)
BILIRUB SERPL-MCNC: 1.8 MG/DL (ref 0.2–1.3)
BUN SERPL-MCNC: 22 MG/DL (ref 7–30)
CALCIUM SERPL-MCNC: 8.8 MG/DL (ref 8.5–10.1)
CHLORIDE SERPL-SCNC: 110 MMOL/L (ref 94–109)
CO2 SERPL-SCNC: 22 MMOL/L (ref 20–32)
CREAT SERPL-MCNC: 1.05 MG/DL (ref 0.66–1.25)
ERCP: NORMAL
ERYTHROCYTE [DISTWIDTH] IN BLOOD BY AUTOMATED COUNT: 13.2 % (ref 10–15)
GFR SERPL CREATININE-BSD FRML MDRD: 69 ML/MIN/{1.73_M2}
GLUCOSE BLDC GLUCOMTR-MCNC: 85 MG/DL (ref 70–99)
GLUCOSE SERPL-MCNC: 96 MG/DL (ref 70–99)
HCT VFR BLD AUTO: 44.9 % (ref 40–53)
HGB BLD-MCNC: 14.6 G/DL (ref 13.3–17.7)
LIPASE SERPL-CCNC: 317 U/L (ref 73–393)
LIPASE SERPL-CCNC: 320 U/L (ref 73–393)
MCH RBC QN AUTO: 32.4 PG (ref 26.5–33)
MCHC RBC AUTO-ENTMCNC: 32.5 G/DL (ref 31.5–36.5)
MCV RBC AUTO: 100 FL (ref 78–100)
PLATELET # BLD AUTO: 219 10E9/L (ref 150–450)
POTASSIUM SERPL-SCNC: 3.7 MMOL/L (ref 3.4–5.3)
PROT SERPL-MCNC: 6.6 G/DL (ref 6.8–8.8)
RBC # BLD AUTO: 4.5 10E12/L (ref 4.4–5.9)
SODIUM SERPL-SCNC: 138 MMOL/L (ref 133–144)
WBC # BLD AUTO: 6.6 10E9/L (ref 4–11)

## 2019-09-23 PROCEDURE — 36000061 ZZH SURGERY LEVEL 3 W FLUORO 1ST 30 MIN - UMMC: Performed by: INTERNAL MEDICINE

## 2019-09-23 PROCEDURE — 71000014 ZZH RECOVERY PHASE 1 LEVEL 2 FIRST HR: Performed by: INTERNAL MEDICINE

## 2019-09-23 PROCEDURE — 36000059 ZZH SURGERY LEVEL 3 EA 15 ADDTL MIN UMMC: Performed by: INTERNAL MEDICINE

## 2019-09-23 PROCEDURE — 82150 ASSAY OF AMYLASE: CPT | Mod: 91 | Performed by: INTERNAL MEDICINE

## 2019-09-23 PROCEDURE — 25500064 ZZH RX 255 OP 636: Performed by: INTERNAL MEDICINE

## 2019-09-23 PROCEDURE — 37000009 ZZH ANESTHESIA TECHNICAL FEE, EACH ADDTL 15 MIN: Performed by: INTERNAL MEDICINE

## 2019-09-23 PROCEDURE — 36415 COLL VENOUS BLD VENIPUNCTURE: CPT | Performed by: INTERNAL MEDICINE

## 2019-09-23 PROCEDURE — 27210794 ZZH OR GENERAL SUPPLY STERILE: Performed by: INTERNAL MEDICINE

## 2019-09-23 PROCEDURE — 40000277 XR SURGERY CARM FLUORO LESS THAN 5 MIN W STILLS: Mod: TC

## 2019-09-23 PROCEDURE — 71000027 ZZH RECOVERY PHASE 2 EACH 15 MINS: Performed by: INTERNAL MEDICINE

## 2019-09-23 PROCEDURE — 25800030 ZZH RX IP 258 OP 636: Performed by: NURSE ANESTHETIST, CERTIFIED REGISTERED

## 2019-09-23 PROCEDURE — 40000170 ZZH STATISTIC PRE-PROCEDURE ASSESSMENT II: Performed by: INTERNAL MEDICINE

## 2019-09-23 PROCEDURE — 25000125 ZZHC RX 250: Performed by: INTERNAL MEDICINE

## 2019-09-23 PROCEDURE — 80053 COMPREHEN METABOLIC PANEL: CPT | Performed by: INTERNAL MEDICINE

## 2019-09-23 PROCEDURE — C1877 STENT, NON-COAT/COV W/O DEL: HCPCS | Performed by: INTERNAL MEDICINE

## 2019-09-23 PROCEDURE — 83690 ASSAY OF LIPASE: CPT | Performed by: INTERNAL MEDICINE

## 2019-09-23 PROCEDURE — 25000128 H RX IP 250 OP 636: Performed by: NURSE ANESTHETIST, CERTIFIED REGISTERED

## 2019-09-23 PROCEDURE — 85027 COMPLETE CBC AUTOMATED: CPT | Performed by: INTERNAL MEDICINE

## 2019-09-23 PROCEDURE — 37000008 ZZH ANESTHESIA TECHNICAL FEE, 1ST 30 MIN: Performed by: INTERNAL MEDICINE

## 2019-09-23 PROCEDURE — 25000565 ZZH ISOFLURANE, EA 15 MIN: Performed by: INTERNAL MEDICINE

## 2019-09-23 PROCEDURE — 25800030 ZZH RX IP 258 OP 636: Performed by: ANESTHESIOLOGY

## 2019-09-23 PROCEDURE — C1769 GUIDE WIRE: HCPCS | Performed by: INTERNAL MEDICINE

## 2019-09-23 PROCEDURE — 82962 GLUCOSE BLOOD TEST: CPT

## 2019-09-23 PROCEDURE — 82150 ASSAY OF AMYLASE: CPT | Performed by: INTERNAL MEDICINE

## 2019-09-23 PROCEDURE — 83690 ASSAY OF LIPASE: CPT | Mod: 91 | Performed by: INTERNAL MEDICINE

## 2019-09-23 PROCEDURE — 25000125 ZZHC RX 250: Performed by: NURSE ANESTHETIST, CERTIFIED REGISTERED

## 2019-09-23 DEVICE — STENT FREEMAN PANCREA FLEX 4FRX11CM W/O FLANGE SGL PIGTAIL: Type: IMPLANTABLE DEVICE | Site: PANCREATIC DUCT | Status: FUNCTIONAL

## 2019-09-23 RX ORDER — EPHEDRINE SULFATE 50 MG/ML
INJECTION, SOLUTION INTRAMUSCULAR; INTRAVENOUS; SUBCUTANEOUS PRN
Status: DISCONTINUED | OUTPATIENT
Start: 2019-09-23 | End: 2019-09-23

## 2019-09-23 RX ORDER — SODIUM CHLORIDE, SODIUM LACTATE, POTASSIUM CHLORIDE, CALCIUM CHLORIDE 600; 310; 30; 20 MG/100ML; MG/100ML; MG/100ML; MG/100ML
INJECTION, SOLUTION INTRAVENOUS CONTINUOUS
Status: DISCONTINUED | OUTPATIENT
Start: 2019-09-23 | End: 2019-09-23 | Stop reason: HOSPADM

## 2019-09-23 RX ORDER — INDOMETHACIN 50 MG/1
100 SUPPOSITORY RECTAL
Status: DISCONTINUED | OUTPATIENT
Start: 2019-09-23 | End: 2019-09-23 | Stop reason: HOSPADM

## 2019-09-23 RX ORDER — SODIUM CHLORIDE, SODIUM LACTATE, POTASSIUM CHLORIDE, CALCIUM CHLORIDE 600; 310; 30; 20 MG/100ML; MG/100ML; MG/100ML; MG/100ML
INJECTION, SOLUTION INTRAVENOUS CONTINUOUS PRN
Status: DISCONTINUED | OUTPATIENT
Start: 2019-09-23 | End: 2019-09-23

## 2019-09-23 RX ORDER — NALOXONE HYDROCHLORIDE 0.4 MG/ML
.1-.4 INJECTION, SOLUTION INTRAMUSCULAR; INTRAVENOUS; SUBCUTANEOUS
Status: DISCONTINUED | OUTPATIENT
Start: 2019-09-23 | End: 2019-09-23 | Stop reason: HOSPADM

## 2019-09-23 RX ORDER — ESMOLOL HYDROCHLORIDE 10 MG/ML
INJECTION INTRAVENOUS PRN
Status: DISCONTINUED | OUTPATIENT
Start: 2019-09-23 | End: 2019-09-23

## 2019-09-23 RX ORDER — FLUMAZENIL 0.1 MG/ML
0.2 INJECTION, SOLUTION INTRAVENOUS
Status: DISCONTINUED | OUTPATIENT
Start: 2019-09-23 | End: 2019-09-23 | Stop reason: HOSPADM

## 2019-09-23 RX ORDER — LIDOCAINE 40 MG/G
CREAM TOPICAL
Status: DISCONTINUED | OUTPATIENT
Start: 2019-09-23 | End: 2019-09-23 | Stop reason: HOSPADM

## 2019-09-23 RX ORDER — ONDANSETRON 2 MG/ML
INJECTION INTRAMUSCULAR; INTRAVENOUS PRN
Status: DISCONTINUED | OUTPATIENT
Start: 2019-09-23 | End: 2019-09-23

## 2019-09-23 RX ORDER — MEPERIDINE HYDROCHLORIDE 25 MG/ML
12.5 INJECTION INTRAMUSCULAR; INTRAVENOUS; SUBCUTANEOUS
Status: DISCONTINUED | OUTPATIENT
Start: 2019-09-23 | End: 2019-09-23 | Stop reason: HOSPADM

## 2019-09-23 RX ORDER — FENTANYL CITRATE 50 UG/ML
25-50 INJECTION, SOLUTION INTRAMUSCULAR; INTRAVENOUS EVERY 5 MIN PRN
Status: DISCONTINUED | OUTPATIENT
Start: 2019-09-23 | End: 2019-09-23 | Stop reason: HOSPADM

## 2019-09-23 RX ORDER — IOPAMIDOL 510 MG/ML
INJECTION, SOLUTION INTRAVASCULAR PRN
Status: DISCONTINUED | OUTPATIENT
Start: 2019-09-23 | End: 2019-09-23 | Stop reason: HOSPADM

## 2019-09-23 RX ORDER — HYDROMORPHONE HYDROCHLORIDE 1 MG/ML
.3-.5 INJECTION, SOLUTION INTRAMUSCULAR; INTRAVENOUS; SUBCUTANEOUS EVERY 10 MIN PRN
Status: DISCONTINUED | OUTPATIENT
Start: 2019-09-23 | End: 2019-09-23 | Stop reason: HOSPADM

## 2019-09-23 RX ORDER — LIDOCAINE HYDROCHLORIDE 20 MG/ML
INJECTION, SOLUTION INFILTRATION; PERINEURAL PRN
Status: DISCONTINUED | OUTPATIENT
Start: 2019-09-23 | End: 2019-09-23

## 2019-09-23 RX ORDER — ONDANSETRON 2 MG/ML
4 INJECTION INTRAMUSCULAR; INTRAVENOUS EVERY 30 MIN PRN
Status: DISCONTINUED | OUTPATIENT
Start: 2019-09-23 | End: 2019-09-23 | Stop reason: HOSPADM

## 2019-09-23 RX ORDER — ONDANSETRON 4 MG/1
4 TABLET, ORALLY DISINTEGRATING ORAL EVERY 30 MIN PRN
Status: DISCONTINUED | OUTPATIENT
Start: 2019-09-23 | End: 2019-09-23 | Stop reason: HOSPADM

## 2019-09-23 RX ORDER — PROPOFOL 10 MG/ML
INJECTION, EMULSION INTRAVENOUS PRN
Status: DISCONTINUED | OUTPATIENT
Start: 2019-09-23 | End: 2019-09-23

## 2019-09-23 RX ORDER — FENTANYL CITRATE 50 UG/ML
INJECTION, SOLUTION INTRAMUSCULAR; INTRAVENOUS PRN
Status: DISCONTINUED | OUTPATIENT
Start: 2019-09-23 | End: 2019-09-23

## 2019-09-23 RX ADMIN — ROCURONIUM BROMIDE 40 MG: 10 INJECTION INTRAVENOUS at 07:10

## 2019-09-23 RX ADMIN — SUGAMMADEX 200 MG: 100 INJECTION, SOLUTION INTRAVENOUS at 08:03

## 2019-09-23 RX ADMIN — ONDANSETRON 4 MG: 2 INJECTION INTRAMUSCULAR; INTRAVENOUS at 07:39

## 2019-09-23 RX ADMIN — SODIUM CHLORIDE, POTASSIUM CHLORIDE, SODIUM LACTATE AND CALCIUM CHLORIDE: 600; 310; 30; 20 INJECTION, SOLUTION INTRAVENOUS at 07:04

## 2019-09-23 RX ADMIN — Medication 2.5 MG: at 07:53

## 2019-09-23 RX ADMIN — ESMOLOL HYDROCHLORIDE 30 MG: 10 INJECTION, SOLUTION INTRAVENOUS at 08:10

## 2019-09-23 RX ADMIN — FENTANYL CITRATE 50 MCG: 50 INJECTION, SOLUTION INTRAMUSCULAR; INTRAVENOUS at 07:43

## 2019-09-23 RX ADMIN — ESMOLOL HYDROCHLORIDE 30 MG: 10 INJECTION, SOLUTION INTRAVENOUS at 08:04

## 2019-09-23 RX ADMIN — PROPOFOL 90 MG: 10 INJECTION, EMULSION INTRAVENOUS at 07:10

## 2019-09-23 RX ADMIN — Medication 5 MG: at 07:37

## 2019-09-23 RX ADMIN — FENTANYL CITRATE 50 MCG: 50 INJECTION, SOLUTION INTRAMUSCULAR; INTRAVENOUS at 07:10

## 2019-09-23 RX ADMIN — SODIUM CHLORIDE, POTASSIUM CHLORIDE, SODIUM LACTATE AND CALCIUM CHLORIDE: 600; 310; 30; 20 INJECTION, SOLUTION INTRAVENOUS at 08:46

## 2019-09-23 RX ADMIN — LIDOCAINE HYDROCHLORIDE 100 MG: 20 INJECTION, SOLUTION INFILTRATION; PERINEURAL at 07:10

## 2019-09-23 ASSESSMENT — MIFFLIN-ST. JEOR: SCORE: 1401.75

## 2019-09-23 NOTE — ANESTHESIA CARE TRANSFER NOTE
Patient: Sandro Yoon    Procedure(s):  ENDOSCOPIC RETROGRADE CHOLANGIOPANCREATOGRAPHY, WITH TUBE OR STENT INSERTION, spinchterotomy, balloon sweep of bile duct and stent placement  Esophagogastroduodenoscopy, With Fine Needle Aspiration Biopsy, With Endoscopic Ultrasound Guidance    Diagnosis: Abnormal Liver Function Tests  Diagnosis Additional Information: No value filed.    Anesthesia Type:   General     Note:  Airway :Nasal Cannula  Patient transferred to:PACU  Comments: VSS< report to RN  Handoff Report: Identifed the Patient, Identified the Reponsible Provider, Reviewed the pertinent medical history, Discussed the surgical course, Reviewed Intra-OP anesthesia mangement and issues during anesthesia, Set expectations for post-procedure period and Allowed opportunity for questions and acknowledgement of understanding      Vitals: (Last set prior to Anesthesia Care Transfer)    CRNA VITALS  9/23/2019 0742 - 9/23/2019 0818      9/23/2019             Pulse:  86    Ht Rate:  90    SpO2:  100 %    Resp Rate (observed):  (!) 7                Electronically Signed By: SUMAYA Renteria CRNA  September 23, 2019  8:18 AM

## 2019-09-23 NOTE — ANESTHESIA PREPROCEDURE EVALUATION
Anesthesia Pre-Procedure Evaluation    Patient: Sandro Yoon   MRN: [id number] : 1945          Preoperative Diagnosis: ERCP    Past Medical History:   Diagnosis Date     Coronary artery disease      Hypertension      Hypothyroid      Stented coronary artery     x4     Transient global amnesia     hx of     Past Surgical History:   Procedure Laterality Date     APPENDECTOMY OPEN       COLONOSCOPY  2009,Due      CYSTOSCOPY, TRANSURETHRAL RESECTION (TUR) TUMOR BLADDER, COMBINED       ENDOSCOPIC ULTRASOUND UPPER GASTROINTESTINAL TRACT (GI) N/A 2019    Procedure: Diagnostic Upper Endoscopic Ultrasound;  Surgeon: Evan Traore MD;  Location: [facility] OR     EXTRACAPSULAR CATARACT EXTRATION WITH INTRAOCULAR LENS IMPLANT Bilateral      HERNIORRHAPHY INGUINAL Right 2019    Procedure: Repair Right Inguinal Hernia w/ Mesh;  Surgeon: Aurora Verdin MD;  Location: [facility] OR     LAPAROSCOPIC CHOLECYSTECTOMY      ,with cholangiogram     SIGMOIDOSCOPY FLEXIBLE           STENT      90% LAD & 99% SECOND DIAGONAL     STENT      LAD     STENT      LAD times 2     TONSILLECTOMY      No Comments Provided       Anesthesia Evaluation     . Pt has had prior anesthetic. Type: MAC and General    No history of anesthetic complications          ROS/MED HX    ENT/Pulmonary:  - neg pulmonary ROS     Neurologic:  - neg neurologic ROS     Cardiovascular: Comment: Stent placed in August. No s/s of chest pain or sob since    (+) hypertension--CAD, angina--stent,  . Taking blood thinners : . . . :. . Previous cardiac testing date:results:date: results:ECG reviewed date:19 results:EKG Interpretation:   Rhythm: Sinus  Rate: 73 bpm  Axis: Normal  Conduction: Normal  QRS: Normal  ST Segments: Normal  T-wave: Normal  PAC's Present: No  PVC's Present: No  QTc: 434 ms    Impression:   Normal sinus rhythm at 73 bpm.    Tor Arguello DO   2019 at 7:38 PM date: results:           METS/Exercise Tolerance:     Hematologic:  - neg hematologic  ROS       Musculoskeletal:  - neg musculoskeletal ROS       GI/Hepatic: Comment: Occasional symptoms of reflux. Takes TUMS prn    (+) GERD Other, liver disease (elevated LFTs),       Renal/Genitourinary:  - ROS Renal section negative       Endo:     (+) thyroid problem hypothyroidism, .      Psychiatric:  - neg psychiatric ROS       Infectious Disease:  - neg infectious disease ROS       Malignancy:      - no malignancy   Other:    - neg other ROS                      Physical Exam  Normal systems: cardiovascular, pulmonary and dental    Airway   Mallampati: II  TM distance: >3 FB  Neck ROM: full    Dental   (+) implants and caps    Cardiovascular   Rhythm and rate: normal      Pulmonary    breath sounds clear to auscultation    Other findings: Natural dentition with dental crowns and an implant        Lab Results   Component Value Date    WBC 6.6 09/23/2019    HGB 14.6 09/23/2019    HCT 44.9 09/23/2019     09/23/2019    CRP 0.6 (H) 03/26/2019    SED 63 (H) 03/26/2019     09/23/2019    POTASSIUM 3.7 09/23/2019    CHLORIDE 110 (H) 09/23/2019    CO2 22 09/23/2019    BUN 22 09/23/2019    CR 1.05 09/23/2019    GLC 96 09/23/2019    JESSICA 8.8 09/23/2019    ALBUMIN 3.4 09/23/2019    PROTTOTAL 6.6 (L) 09/23/2019    ALT 55 09/23/2019    AST 43 09/23/2019    GGT 1,640 (H) 03/26/2019    ALKPHOS 291 (H) 09/23/2019    BILITOTAL 1.8 (H) 09/23/2019    LIPASE 320 09/23/2019    AMYLASE 94 09/23/2019    INR 1.05 08/22/2019    T4 1.16 06/26/2019       Preop Vitals  BP Readings from Last 3 Encounters:   09/23/19 (!) 143/97   09/10/19 100/68   05/30/19 118/80    Pulse Readings from Last 3 Encounters:   09/23/19 74   09/10/19 82   05/30/19 68      Resp Readings from Last 3 Encounters:   09/23/19 16   09/10/19 16   05/30/19 16    SpO2 Readings from Last 3 Encounters:   09/23/19 97%   09/10/19 97%   05/13/19 98%      Temp Readings from Last 1 Encounters:   09/23/19  "36.4  C (97.5  F) (Oral)    Ht Readings from Last 1 Encounters:   19 1.734 m (5' 8.27\")      Wt Readings from Last 1 Encounters:   19 68.8 kg (151 lb 10.8 oz)    Estimated body mass index is 22.88 kg/m  as calculated from the following:    Height as of an earlier encounter on 19: 1.734 m (5' 8.27\").    Weight as of an earlier encounter on 19: 68.8 kg (151 lb 10.8 oz).       Anesthesia Plan      History & Physical Review  History and physical reviewed and following examination; no interval change.    ASA Status:  3 .        Plan for General with Intravenous induction. Maintenance will be Inhalation and Balanced.    PONV prophylaxis:  Ondansetron (or other 5HT-3) and Dexamethasone or Solumedrol       Postoperative Care  Postoperative pain management:  IV analgesics.      Consents  Anesthetic plan, risks, benefits and alternatives discussed with:  Patient.  Use of blood products discussed: No .   .        Anesthesia Pre-Procedure Evaluation    Patient: Sandro Yoon   MRN: [id number] : 1945          Preoperative Diagnosis: Right Inguinal Hernia    Procedure(s):  Repair Right Inguinal Hernia w/ Mesh    Past Medical History:   Diagnosis Date     Coronary artery disease      Hypertension      Hypothyroid      Stented coronary artery     x4     Transient global amnesia     hx of     Past Surgical History:   Procedure Laterality Date     APPENDECTOMY OPEN       COLONOSCOPY  2009,Due      CYSTOSCOPY, TRANSURETHRAL RESECTION (TUR) TUMOR BLADDER, COMBINED       ENDOSCOPIC ULTRASOUND UPPER GASTROINTESTINAL TRACT (GI) N/A 2019    Procedure: Diagnostic Upper Endoscopic Ultrasound;  Surgeon: Evan Traore MD;  Location: [facility] OR     EXTRACAPSULAR CATARACT EXTRATION WITH INTRAOCULAR LENS IMPLANT Bilateral      HERNIORRHAPHY INGUINAL Right 2019    Procedure: Repair Right Inguinal Hernia w/ Mesh;  Surgeon: Aurora Verdin MD;  Location: [facility] OR     LAPAROSCOPIC " CHOLECYSTECTOMY      2007,with cholangiogram     SIGMOIDOSCOPY FLEXIBLE      1998     STENT  2005    90% LAD & 99% SECOND DIAGONAL     STENT  2003    LAD     STENT  2018    LAD times 2     TONSILLECTOMY      No Comments Provided       Anesthesia Evaluation     . Pt has had prior anesthetic.     No history of anesthetic complications          ROS/MED HX    ENT/Pulmonary:  - neg pulmonary ROS     Neurologic:  - neg neurologic ROS     Cardiovascular: Comment: Stent placed in August. No s/s of chest pain or sob since    (+) hypertension--CAD, --. : . . . :. .       METS/Exercise Tolerance:     Hematologic:  - neg hematologic  ROS       Musculoskeletal:  - neg musculoskeletal ROS       GI/Hepatic: Comment: Occasional symptoms of reflux. Takes TUMS prn    (+) GERD Other,       Renal/Genitourinary:  - ROS Renal section negative       Endo:     (+) thyroid problem hypothyroidism, .      Psychiatric:  - neg psychiatric ROS       Infectious Disease:  - neg infectious disease ROS       Malignancy:      - no malignancy   Other:    - neg other ROS                      Physical Exam  Normal systems: cardiovascular, pulmonary and dental    Airway   Mallampati: II  TM distance: >3 FB  Neck ROM: full    Dental     Cardiovascular   Rhythm and rate: regular and normal      Pulmonary    breath sounds clear to auscultation            Lab Results   Component Value Date    WBC 6.6 09/23/2019    HGB 14.6 09/23/2019    HCT 44.9 09/23/2019     09/23/2019    CRP 0.6 (H) 03/26/2019    SED 63 (H) 03/26/2019     09/23/2019    POTASSIUM 3.7 09/23/2019    CHLORIDE 110 (H) 09/23/2019    CO2 22 09/23/2019    BUN 22 09/23/2019    CR 1.05 09/23/2019    GLC 96 09/23/2019    JESSICA 8.8 09/23/2019    ALBUMIN 3.4 09/23/2019    PROTTOTAL 6.6 (L) 09/23/2019    ALT 55 09/23/2019    AST 43 09/23/2019    GGT 1,640 (H) 03/26/2019    ALKPHOS 291 (H) 09/23/2019    BILITOTAL 1.8 (H) 09/23/2019    LIPASE 320 09/23/2019    AMYLASE 94 09/23/2019    INR 1.05  "08/22/2019    T4 1.16 06/26/2019       Preop Vitals  BP Readings from Last 3 Encounters:   09/23/19 (!) 143/97   09/10/19 100/68   05/30/19 118/80    Pulse Readings from Last 3 Encounters:   09/23/19 74   09/10/19 82   05/30/19 68      Resp Readings from Last 3 Encounters:   09/23/19 16   09/10/19 16   05/30/19 16    SpO2 Readings from Last 3 Encounters:   09/23/19 97%   09/10/19 97%   05/13/19 98%      Temp Readings from Last 1 Encounters:   09/23/19 36.4  C (97.5  F) (Oral)    Ht Readings from Last 1 Encounters:   09/23/19 1.734 m (5' 8.27\")      Wt Readings from Last 1 Encounters:   09/23/19 68.8 kg (151 lb 10.8 oz)    Estimated body mass index is 22.88 kg/m  as calculated from the following:    Height as of an earlier encounter on 9/23/19: 1.734 m (5' 8.27\").    Weight as of an earlier encounter on 9/23/19: 68.8 kg (151 lb 10.8 oz).       Anesthesia Plan      History & Physical Review      ASA Status:  3 .    NPO Status:  > 8 hours    Plan for MAC          Postoperative Care      Consents  Anesthetic plan, risks, benefits and alternatives discussed with:  Patient..                 MD Louie Valadez MD        PHYSICAL EXAM:   Mental Status/Neuro: A/A/O   Airway: Facies: Feasible  Mallampati: I  Mouth/Opening: Full  TM distance: > 6 cm  Neck ROM: Full   Respiratory: Auscultation: CTAB     Resp. Rate: Normal     Resp. Effort: Normal      CV: Rhythm: Regular  Rate: Age appropriate  Heart: Normal Sounds  Edema: None   Comments:      Dental: Normal Dentition                Assessment:   ASA SCORE: 3    H&P: History and physical reviewed and following examination; no interval change.         Plan:   Anes. Type:  General   Pre-Medication: None   Induction:  IV (Standard)   Airway: ETT; Oral   Access/Monitoring: PIV   Maintenance: Balanced     Postop Plan:   Postop Pain: Opioids  Postop Sedation/Airway: Not planned     PONV Management:   Adult Risk Factors:, Postop Opioids   Prevention: " Ondansetron     CONSENT: Direct conversation   Plan and risks discussed with: Patient   Blood Products: Consented (ALL Blood Products)

## 2019-09-23 NOTE — OP NOTE
ERCP 09/23/2019  7:16 AM [facility], [address] [place] [address] [phone]     Endoscopy Department   _______________________________________________________________________________   Patient Name: Sandro Yoon         Procedure Date: 9/23/2019 7:16 AM   MRN: [id number]                       Account Number: [id number]   YOB: 1945               Admit Type: Outpatient   Age: 74                               Room: OR   Gender: Male                          Note Status: Finalized   Attending MD: Evan Traore MD  Pause for the Cause: pause for cause    completed   Total Sedation Time:                     _______________________________________________________________________________       Procedure:           ERCP   Indications:         Abnormal MRCP, Abnormal endoscopic ultrasound of the                        biliary system, For therapy of bile duct stone(s)   Providers:           Evan Traore MD   Patient Profile:     Mr Yoon is a 73yo gentleman with a history of                        pancreatic cysts (small without concerning feature)                        found to have main duct sludge who was initially seen by                        JOLLY Wise for abnormal liver studies (all 4). These have                        mostly normalized and with the patient now being able to                        hold his antithrombotics he proceeds to ERCP (with mild                        cholestatic elevations alone). Tandem EUS for liver                        biopsy will be performed IF debris or stone is not found                        in the main duct to explain his numbers.   Referring MD:        Kaylen Wise   Medicines:           General Anesthesia, Indomethacin not given due to                        contraindication   Complications:       No immediate complications.    _______________________________________________________________________________   Procedure:           Pre-Anesthesia Assessment:                        - Prior to the procedure, a History and Physical was                        performed, and patient medications and allergies were                        reviewed. The patient is competent. The risks and                        benefits of the procedure and the sedation options and                        risks were discussed with the patient. All questions                        were answered and informed consent was obtained. Patient                        identification and proposed procedure were verified by                        the nurse in the pre-procedure area. Mental Status                        Examination: alert and oriented. Airway Examination:                        Mallampati Class II (the uvula but not tonsillar pillars                        visualized). Respiratory Examination: clear to                        auscultation. CV Examination: normal. ASA Grade                        Assessment: II - A patient with mild systemic disease.                        After reviewing the risks and benefits, the patient was                        deemed in satisfactory condition to undergo the                        procedure. The anesthesia plan was to use general                        anesthesia. Immediately prior to administration of                        medications, the patient was re-assessed for adequacy to                        receive sedatives. The heart rate, respiratory rate,                        oxygen saturations, blood pressure, adequacy of                        pulmonary ventilation, and response to care were                        monitored throughout the procedure. The physical status                        of the patient was re-assessed after the procedure.                        After obtaining informed consent, the scope was passed  "                       under direct vision. Throughout the procedure, the                        patient's blood pressure, pulse, and oxygen saturations                        were monitored continuously. The duodenoscope was                        introduced through the mouth, and used to inject                        contrast into and used to inject contrast into the bile                        duct and ventral pancreatic duct. The ERCP was                        accomplished without difficulty. The patient tolerated                        the procedure well.                                                                                     Findings:        A  film of the right upper quadrant demonstrated cholecystectomy        clips. Limited white light views of the foregut were notable for a small        ampulla and were otherwise unremarkable. The ventral pancreatic duct was        initally deeply cannulated with the short-nosed traction sphincterotome        in concert with an 0.025\" Visiglide wire. Contrast was injected and I        personally interpreted the pancreatic duct images. Ductal flow of        contrast was adequate, image quality was excellent and contrast extended        throughout the pancreatic duct which appeared normal. Dual wire        technique was then employed and the bile duct was deeply cannulated with        the short-nosed traction sphincterotome and a second 0.025\" Visiglide.        Contrast was injected. The intrahepatics were decompressed and healthy        appearing as were the bifurcation and common, the latter of which        measured less than 10mm. There was suggestion of filling defect in the        lower third and the ampulla was tight. A 6 mm biliary sphincterotomy was        made with a monofilament traction (standard) sphincterotome using ERBE        electrocautery. There was no post-sphincterotomy bleeding. A 4 Fr by 11        cm Gardner prophylactic stent with a " single external pigtail and no        internal flaps was placed 11 cm into the ventral pancreatic duct. Clear        fluid flowed through the stent and the stent was in good position. The        biliary tree was swept with a 12 mm balloon and basket starting at the        bifurcation. Debris was swept from the duct and drainage was excellent.                                                                                     Impression:          - Dual wire cannulation technique utilized with                        ampullary stenosis                        - Unremarkable main pancreatic duct status post                        prophylactic pancreatic stent placement                        - Ampullary stenosis with common duct debris managed by                        biliary sphincterotomy and sweep                        - Otherwise unremarkable, healthy appearing                        postcholecystectomy biliary anatomy   Recommendation:      - General anethesia recovery with 2h observation,                        infusion of a second liter of Ringers, and laboratories                        to follow with disposition based on course and results                        - Repeat liver studies in one month at the time of plain                        film to ensure spontaneous passage of the pancreatic                        stent (anticipate normal numbers and and spontaneous                        ejection)                        - With persistent elevation of numbers, EUS for liver                        biopsy (again will need to hold antithrombotics, and not                        anticipated)                        - With retained stent, EGD on antithrombotics                        - Antithrombotics may resume in 48h                        - The findings and recommendations were discussed with                        the patient and their family.                                                                                        electronically signed by YASMINE Traore

## 2019-09-23 NOTE — OR NURSING
Dr. Saha at bedside in PACU and has spoken with patient. Dr. Saha stated he will enter anesthesia sign out.

## 2019-09-23 NOTE — ANESTHESIA POSTPROCEDURE EVALUATION
Anesthesia POST Procedure Evaluation    Patient: Sandro Yoon   MRN:     [id number] Gender:   male   Age:    74 year old :      1945        Preoperative Diagnosis: Abnormal Liver Function Tests   Procedure(s):  ENDOSCOPIC RETROGRADE CHOLANGIOPANCREATOGRAPHY, WITH TUBE OR STENT INSERTION, spinchterotomy, balloon sweep of bile duct and stent placement  Esophagogastroduodenoscopy, With Fine Needle Aspiration Biopsy, With Endoscopic Ultrasound Guidance   Postop Comments: No value filed.       Anesthesia Type:  Not documented  General    Reportable Event: NO     PAIN: Uncomplicated   Sign Out status: Comfortable, Well controlled pain     PONV: No PONV   Sign Out status:  No Nausea or Vomiting     Neuro/Psych: Uneventful perioperative course   Sign Out Status: Preoperative baseline; Age appropriate mentation     Airway/Resp.: Uneventful perioperative course   Sign Out Status: Non labored breathing, age appropriate RR; Resp. Status within EXPECTED Parameters     CV: Uneventful perioperative course   Sign Out status: Appropriate BP and perfusion indices; Appropriate HR/Rhythm     Disposition:   Sign Out in:  PACU  Disposition:  Phase II; Home  Recovery Course: Uneventful  Follow-Up: Not required           Last Anesthesia Record Vitals:  CRNA VITALS  2019 0742 - 2019 0831      2019             Pulse:  86    Ht Rate:  90    SpO2:  100 %    Resp Rate (observed):  (!) 7          Last PACU Vitals:  Vitals Value Taken Time   /86 2019  8:20 AM   Temp     Pulse 81 2019  8:20 AM   Resp     SpO2 99 % 2019  8:30 AM   Temp src     NIBP     Pulse     SpO2     Resp     Temp     Ht Rate     Temp 2     Vitals shown include unvalidated device data.      Electronically Signed By: Louie Saha MD, 2019, 8:31 AM

## 2019-09-23 NOTE — DISCHARGE INSTRUCTIONS
Antithrombotics may resume in 48h     [facility]  Same-Day Surgery   Adult Discharge Orders & Instructions     For 24 hours after surgery    1. Get plenty of rest.  A responsible adult must stay with you for at least 24 hours after you leave the hospital.   2. Do not drive or use heavy equipment.  If you have weakness or tingling, don't drive or use heavy equipment until this feeling goes away.  3. Do not drink alcohol.  4. Avoid strenuous or risky activities.  Ask for help when climbing stairs.   5. You may feel lightheaded.  IF so, sit for a few minutes before standing.  Have someone help you get up.   6. If you have nausea (feel sick to your stomach): Drink only clear liquids such as apple juice, ginger ale, broth or 7-Up.  Rest may also help.  Be sure to drink enough fluids.  Move to a regular diet as you feel able.  7. You may have a slight fever. Call the doctor if your fever is over 100 F (37.7 C) (taken under the tongue) or lasts longer than 24 hours.  8. You may have a dry mouth, a sore throat, muscle aches or trouble sleeping.  These should go away after 24 hours.  9. Do not make important or legal decisions.   Call your doctor for any of the followin.  Signs of infection (fever, growing tenderness at the surgery site, a large amount of drainage or bleeding, severe pain, foul-smelling drainage, redness, swelling).    2. It has been over 8 to 10 hours since surgery and you are still not able to urinate (pass water).    3.  Headache for over 24 hours.    4.  Numbness, tingling or weakness the day after surgery (if you had spinal anesthesia).  To contact a doctor, call Dr Traore at [phone] (clinic)   or:        [phone] and ask for the resident on call for   Gastroenterology  (answered 24 hours a day)      Emergency Department:    [facility]: [phone]       (TTY for hearing impaired: [phone])

## 2019-09-23 NOTE — OR NURSING
Dr. Traore at bedside in PACU discussed surgery outcome and plan of care with patient.  Phase II nurse to notify Dr. Traore when 2 hour  Post amylase and lipase results are available.      Verbal order with read back obtained per Dr. Traore, patient to have a total of 2L Lactated Ringers prior to discharge.

## 2019-09-23 NOTE — OR NURSING
Dr. Traore notified of pt's post procedure lab results.  Dr. Traore stated he did not need to see pt and pt can be discharged home.

## 2019-09-23 NOTE — OR NURSING
Dr. Traore with GI at bedside in phase 2 talking with pt and wife regarding future appointments and plan of care for the remainder of the morning.

## 2019-09-26 ENCOUNTER — CARE COORDINATION (OUTPATIENT)
Dept: GASTROENTEROLOGY | Facility: CLINIC | Age: 74
End: 2019-09-26

## 2019-09-26 DIAGNOSIS — Z46.89 ENCOUNTER FOR REMOVAL OF PANCREATIC STENT: Primary | ICD-10-CM

## 2019-09-26 DIAGNOSIS — R79.89 ABNORMAL LFTS (LIVER FUNCTION TESTS): ICD-10-CM

## 2019-09-26 NOTE — LETTER
Patient:  Sandro Yoon  :   1945  MRN:     [id number]        Mr.Douglas Roosevelt Yoon  [address] [facility] [address]        2019    Dear ,    We are writing to inform you of your test results. In short, good news. The pancreatic stent self-ejected. I hope your liver studies have improved and you are feeling well.     I have included the formal documtentation of the results below. It continues to be a pleasure participating in your care.  Please feel free to contact our clinic with any further questions.      Sincerely,    Evan Traore MD PhD TAMMY CHAPARRO  Director of Endoscopy  Associate Professor of Medicine, Surgery and Pediatrics  Interventional and Therapeutic Endoscopy    [facility]  Division of Gastroenterology and Hepatology  [facility] [address] [place], [place] [address]    New Consultations  [phone]  Procedure Scheduling [phone]  Clinical Nurse Coordinator [phone]  Clinical Fax   [phone]  Administrative   [phone]  Administrative Fax  [phone]    Resulted Orders   XR Abdomen 2 Views    Narrative    PROCEDURE:  XR ABDOMEN 2 VW    HISTORY:  Encounter for removal of pancreatic stent.     TECHNIQUE:  Upright and supine views of the abdomen were obtained.    COMPARISON:  MR abdomen 2019    FINDINGS:     There is a nonobstructive bowel gas pattern. No free air is seen. No  abnormal calcifications are evident. Surgical clips are present in the  right upper quadrant.      Impression    IMPRESSION:    No obstruction or free air.    DIEGO FUENTES MD

## 2019-10-23 ENCOUNTER — HOSPITAL ENCOUNTER (OUTPATIENT)
Dept: GENERAL RADIOLOGY | Facility: OTHER | Age: 74
Discharge: HOME | End: 2019-10-23
Attending: INTERNAL MEDICINE | Admitting: FAMILY MEDICINE
Payer: MEDICARE

## 2019-10-23 ENCOUNTER — ALLIED HEALTH/NURSE VISIT (OUTPATIENT)
Dept: FAMILY MEDICINE | Facility: OTHER | Age: 74
End: 2019-10-23
Attending: INTERNAL MEDICINE
Payer: MEDICARE

## 2019-10-23 DIAGNOSIS — R79.89 ABNORMAL LFTS (LIVER FUNCTION TESTS): ICD-10-CM

## 2019-10-23 DIAGNOSIS — R74.01 ELEVATED TRANSAMINASE LEVEL: ICD-10-CM

## 2019-10-23 DIAGNOSIS — Z23 NEED FOR PROPHYLACTIC VACCINATION AND INOCULATION AGAINST INFLUENZA: Primary | ICD-10-CM

## 2019-10-23 LAB
ALBUMIN SERPL-MCNC: 4 G/DL (ref 3.5–5.7)
ALP SERPL-CCNC: 205 U/L (ref 34–104)
ALT SERPL W P-5'-P-CCNC: 39 U/L (ref 7–52)
AST SERPL W P-5'-P-CCNC: 38 U/L (ref 13–39)
BILIRUB DIRECT SERPL-MCNC: 0.4 MG/DL (ref 0–0.2)
BILIRUB SERPL-MCNC: 2 MG/DL (ref 0.3–1)
INR PPP: 1.06 (ref 0–1.3)
PROT SERPL-MCNC: 6.5 G/DL (ref 6.4–8.9)

## 2019-10-23 PROCEDURE — 90471 IMMUNIZATION ADMIN: CPT

## 2019-10-23 PROCEDURE — 85610 PROTHROMBIN TIME: CPT | Mod: ZL | Performed by: PHYSICIAN ASSISTANT

## 2019-10-23 PROCEDURE — G0008 ADMIN INFLUENZA VIRUS VAC: HCPCS

## 2019-10-23 PROCEDURE — 90662 IIV NO PRSV INCREASED AG IM: CPT

## 2019-10-23 PROCEDURE — 74019 RADEX ABDOMEN 2 VIEWS: CPT

## 2019-10-23 PROCEDURE — 80076 HEPATIC FUNCTION PANEL: CPT | Mod: ZL | Performed by: PHYSICIAN ASSISTANT

## 2019-10-23 PROCEDURE — 36415 COLL VENOUS BLD VENIPUNCTURE: CPT | Mod: ZL | Performed by: PHYSICIAN ASSISTANT

## 2019-10-23 NOTE — NURSING NOTE
Immunization Documentation    Prior to Immunization administration, verified patients identity using patient's name and date of birth. Please see IMMUNIZATIONS  and order for additional information.  Patient / Parent instructed to remain in clinic for 15 minutes and report any adverse reaction to staff immediately.    Was entire vial of medication used? Yes  Vial/Syringe: Benito Mena LPN  10/23/2019

## 2019-11-21 DIAGNOSIS — R74.01 ELEVATED TRANSAMINASE LEVEL: ICD-10-CM

## 2019-11-21 DIAGNOSIS — R79.89 ABNORMAL LFTS (LIVER FUNCTION TESTS): ICD-10-CM

## 2019-11-21 LAB
ALBUMIN SERPL-MCNC: 4.2 G/DL (ref 3.5–5.7)
ALP SERPL-CCNC: 177 U/L (ref 34–104)
ALT SERPL W P-5'-P-CCNC: 35 U/L (ref 7–52)
AST SERPL W P-5'-P-CCNC: 33 U/L (ref 13–39)
BILIRUB DIRECT SERPL-MCNC: 0.3 MG/DL (ref 0–0.2)
BILIRUB SERPL-MCNC: 1.9 MG/DL (ref 0.3–1)
INR PPP: 1.01 (ref 0–1.3)
PROT SERPL-MCNC: 6.9 G/DL (ref 6.4–8.9)

## 2019-11-21 PROCEDURE — 85610 PROTHROMBIN TIME: CPT | Mod: ZL | Performed by: PHYSICIAN ASSISTANT

## 2019-11-21 PROCEDURE — 36415 COLL VENOUS BLD VENIPUNCTURE: CPT | Mod: ZL | Performed by: PHYSICIAN ASSISTANT

## 2019-11-21 PROCEDURE — 80076 HEPATIC FUNCTION PANEL: CPT | Mod: ZL | Performed by: PHYSICIAN ASSISTANT

## 2019-12-18 DIAGNOSIS — R79.89 ABNORMAL LFTS (LIVER FUNCTION TESTS): ICD-10-CM

## 2019-12-18 DIAGNOSIS — R74.01 ELEVATED TRANSAMINASE LEVEL: ICD-10-CM

## 2019-12-18 LAB
ALBUMIN SERPL-MCNC: 4.5 G/DL (ref 3.5–5.7)
ALP SERPL-CCNC: 192 U/L (ref 34–104)
ALT SERPL W P-5'-P-CCNC: 39 U/L (ref 7–52)
AST SERPL W P-5'-P-CCNC: 34 U/L (ref 13–39)
BILIRUB DIRECT SERPL-MCNC: 0.2 MG/DL (ref 0–0.2)
BILIRUB SERPL-MCNC: 2.3 MG/DL (ref 0.3–1)
INR PPP: 1.02 (ref 0–1.3)
PROT SERPL-MCNC: 7.4 G/DL (ref 6.4–8.9)

## 2019-12-18 PROCEDURE — 80076 HEPATIC FUNCTION PANEL: CPT | Mod: ZL | Performed by: PHYSICIAN ASSISTANT

## 2019-12-18 PROCEDURE — 36415 COLL VENOUS BLD VENIPUNCTURE: CPT | Mod: ZL | Performed by: PHYSICIAN ASSISTANT

## 2019-12-18 PROCEDURE — 85610 PROTHROMBIN TIME: CPT | Mod: ZL | Performed by: PHYSICIAN ASSISTANT

## 2020-01-21 DIAGNOSIS — R74.01 ELEVATED TRANSAMINASE LEVEL: ICD-10-CM

## 2020-01-21 DIAGNOSIS — R79.89 ABNORMAL LFTS (LIVER FUNCTION TESTS): ICD-10-CM

## 2020-01-21 LAB
ALBUMIN SERPL-MCNC: 4.2 G/DL (ref 3.5–5.7)
ALP SERPL-CCNC: 175 U/L (ref 34–104)
ALT SERPL W P-5'-P-CCNC: 29 U/L (ref 7–52)
AST SERPL W P-5'-P-CCNC: 28 U/L (ref 13–39)
BILIRUB DIRECT SERPL-MCNC: 0.4 MG/DL (ref 0–0.2)
BILIRUB SERPL-MCNC: 2.3 MG/DL (ref 0.3–1)
INR PPP: 1.03 (ref 0–1.3)
PROT SERPL-MCNC: 6.7 G/DL (ref 6.4–8.9)

## 2020-01-21 PROCEDURE — 36415 COLL VENOUS BLD VENIPUNCTURE: CPT | Mod: ZL | Performed by: PHYSICIAN ASSISTANT

## 2020-01-21 PROCEDURE — 85610 PROTHROMBIN TIME: CPT | Mod: ZL | Performed by: PHYSICIAN ASSISTANT

## 2020-01-21 PROCEDURE — 80076 HEPATIC FUNCTION PANEL: CPT | Mod: ZL | Performed by: PHYSICIAN ASSISTANT

## 2020-02-17 ENCOUNTER — TELEPHONE (OUTPATIENT)
Dept: PEDIATRICS | Facility: OTHER | Age: 75
End: 2020-02-17

## 2020-02-17 NOTE — TELEPHONE ENCOUNTER
Reason for call: Medication or medication refill    Name of medication requested: Brilinta-was ordered by  in [place] and he is wondering if [facility] would refill for one month.      Are you out of the medication? no    What pharmacy do you use? [facility]    Preferred method for responding to this message: Telephone Call    Phone number patient can be reached at: Home number on file [phone] (home)    If we cannot reach you directly, may we leave a detailed response at the number you provided? Yes

## 2020-02-17 NOTE — TELEPHONE ENCOUNTER
Spoke to patient. Medication is prescribed through MD at [facility]. Patient is not out of medication but wanted 1 month supply to get him by till his appointment with this MD at [facility]. Pt also informed me MD at [facility] is considering discontinuing this medication. Informed patient he needs to contact the prescribing doctor for further refills and he can call back if he has any issues with this. Nirmala Baltazar RN on 2/17/2020 at 12:04 PM

## 2020-02-20 DIAGNOSIS — R74.01 ELEVATED TRANSAMINASE LEVEL: ICD-10-CM

## 2020-02-20 DIAGNOSIS — R79.89 ABNORMAL LFTS (LIVER FUNCTION TESTS): ICD-10-CM

## 2020-02-20 LAB
ALBUMIN SERPL-MCNC: 3.9 G/DL (ref 3.5–5.7)
ALP SERPL-CCNC: 157 U/L (ref 34–104)
ALT SERPL W P-5'-P-CCNC: 33 U/L (ref 7–52)
AST SERPL W P-5'-P-CCNC: 34 U/L (ref 13–39)
BILIRUB DIRECT SERPL-MCNC: 0.3 MG/DL (ref 0–0.2)
BILIRUB SERPL-MCNC: 1.7 MG/DL (ref 0.3–1)
INR PPP: 0.99 (ref 0–1.3)
PROT SERPL-MCNC: 6.5 G/DL (ref 6.4–8.9)

## 2020-02-20 PROCEDURE — 85610 PROTHROMBIN TIME: CPT | Mod: ZL | Performed by: PHYSICIAN ASSISTANT

## 2020-02-20 PROCEDURE — 80076 HEPATIC FUNCTION PANEL: CPT | Mod: ZL | Performed by: PHYSICIAN ASSISTANT

## 2020-02-20 PROCEDURE — 36415 COLL VENOUS BLD VENIPUNCTURE: CPT | Mod: ZL | Performed by: PHYSICIAN ASSISTANT

## 2020-02-22 ENCOUNTER — MYC MEDICAL ADVICE (OUTPATIENT)
Dept: GASTROENTEROLOGY | Facility: CLINIC | Age: 75
End: 2020-02-22

## 2020-02-22 DIAGNOSIS — R79.89 ABNORMAL LFTS (LIVER FUNCTION TESTS): Primary | ICD-10-CM

## 2020-03-10 ENCOUNTER — TRANSFERRED RECORDS (OUTPATIENT)
Dept: HEALTH INFORMATION MANAGEMENT | Facility: OTHER | Age: 75
End: 2020-03-10

## 2020-03-11 ENCOUNTER — HEALTH MAINTENANCE LETTER (OUTPATIENT)
Age: 75
End: 2020-03-11

## 2020-05-04 DIAGNOSIS — E03.9 ACQUIRED HYPOTHYROIDISM: Chronic | ICD-10-CM

## 2020-05-04 DIAGNOSIS — I25.10 ASCVD (ARTERIOSCLEROTIC CARDIOVASCULAR DISEASE): ICD-10-CM

## 2020-05-05 RX ORDER — ATORVASTATIN CALCIUM 10 MG/1
10 TABLET, FILM COATED ORAL DAILY
Qty: 90 TABLET | Refills: 0 | Status: SHIPPED | OUTPATIENT
Start: 2020-05-05 | End: 2020-07-30

## 2020-05-05 RX ORDER — LEVOTHYROXINE SODIUM 88 UG/1
88 TABLET ORAL DAILY
Qty: 90 TABLET | Refills: 3 | Status: SHIPPED | OUTPATIENT
Start: 2020-05-05 | End: 2020-09-21

## 2020-05-05 NOTE — TELEPHONE ENCOUNTER
Routing refill request to provider for review/approval because:  Labs not current:  Lipid    LOV: 9/10/19  Mary Pace RN on 5/5/2020 at 4:10 PM

## 2020-05-05 NOTE — TELEPHONE ENCOUNTER
"Requested Prescriptions   Pending Prescriptions Disp Refills     levothyroxine (SYNTHROID/LEVOTHROID) 88 MCG tablet [Pharmacy Med Name: LEVOTHYROXINE 88MCG TABLET] 90 tablet 2     Sig: TAKE 1 TABLET (88 MCG) BY MOUTH DAILY       Thyroid Protocol Failed - 5/4/2020 10:35 AM        Failed - Recent (12 mo) or future (30 days) visit within the authorizing provider's specialty     Patient has had an office visit with the authorizing provider or a provider within the authorizing providers department within the previous 12 mos or has a future within next 30 days. See \"Patient Info\" tab in inbasket, or \"Choose Columns\" in Meds & Orders section of the refill encounter.              Failed - Normal TSH on file in past 12 months     No lab results found.           Passed - Patient is 12 years or older        Passed - Medication is active on med list             Last Written Prescription Date:  08/05/2019  Last Fill Quantity: 90,   # refills: 2  Last Office Visit: 04/16/2019 with Tor Arguello,   Future Office visit:   None noted.  Unable to complete prescription refill per RN medication refill policy. Will route to provider for review and consideration.  Bernadette Broussard RN on 5/5/2020 at 1:36 PM                "

## 2020-05-20 DIAGNOSIS — R74.01 ELEVATED TRANSAMINASE LEVEL: ICD-10-CM

## 2020-05-20 DIAGNOSIS — R79.89 ABNORMAL LFTS (LIVER FUNCTION TESTS): ICD-10-CM

## 2020-05-20 LAB
ALBUMIN SERPL-MCNC: 4 G/DL (ref 3.5–5.7)
ALP SERPL-CCNC: 121 U/L (ref 34–104)
ALT SERPL W P-5'-P-CCNC: 26 U/L (ref 7–52)
AST SERPL W P-5'-P-CCNC: 26 U/L (ref 13–39)
BILIRUB DIRECT SERPL-MCNC: 0.4 MG/DL (ref 0–0.2)
BILIRUB SERPL-MCNC: 2.3 MG/DL (ref 0.3–1)
INR PPP: 1.01 (ref 0–1.3)
PROT SERPL-MCNC: 6.6 G/DL (ref 6.4–8.9)

## 2020-05-20 PROCEDURE — 36415 COLL VENOUS BLD VENIPUNCTURE: CPT | Mod: ZL | Performed by: PHYSICIAN ASSISTANT

## 2020-05-20 PROCEDURE — 85610 PROTHROMBIN TIME: CPT | Mod: ZL | Performed by: PHYSICIAN ASSISTANT

## 2020-05-20 PROCEDURE — 80076 HEPATIC FUNCTION PANEL: CPT | Mod: ZL | Performed by: PHYSICIAN ASSISTANT

## 2020-06-29 ENCOUNTER — TELEPHONE (OUTPATIENT)
Dept: PEDIATRICS | Facility: OTHER | Age: 75
End: 2020-06-29

## 2020-06-29 DIAGNOSIS — M25.551 HIP PAIN, RIGHT: Primary | ICD-10-CM

## 2020-06-29 NOTE — TELEPHONE ENCOUNTER
Reason for call: Request for a referral.    Referral requested for what concern?  PT    Have you already been seen by the specialty you need the referral for?  no    If yes, Date:   ,  Location:   ,  Provider:       If no,  Where do you want to go?   [facility]    Additional comments:       Preferred method for responding to this message: Telephone Call    Phone number patient can be reached at? Home number on file [phone] (home)    If we can't reach you directly, may we leave a detailed response at the number you provided? Yes

## 2020-06-29 NOTE — TELEPHONE ENCOUNTER
Patient has concerns of right hip pain, radiates down the leg and up the back. Patient said he does have some stretches to do, but would like to see PT here.   Niranjan Rios LPN on 6/29/2020 at 12:57 PM

## 2020-06-29 NOTE — TELEPHONE ENCOUNTER
I'm happy to refer him, for what reason does he need PT?    Signed, Guero Turner MD, FAAP, FACP  Internal Medicine & Pediatrics

## 2020-07-21 ENCOUNTER — HOSPITAL ENCOUNTER (OUTPATIENT)
Dept: PHYSICAL THERAPY | Facility: OTHER | Age: 75
Setting detail: THERAPIES SERIES
End: 2020-07-21
Attending: INTERNAL MEDICINE
Payer: MEDICARE

## 2020-07-21 DIAGNOSIS — M25.551 HIP PAIN, RIGHT: ICD-10-CM

## 2020-07-21 PROCEDURE — 97035 APP MDLTY 1+ULTRASOUND EA 15: CPT | Mod: GP | Performed by: PHYSICAL THERAPIST

## 2020-07-21 PROCEDURE — 97162 PT EVAL MOD COMPLEX 30 MIN: CPT | Mod: GP | Performed by: PHYSICAL THERAPIST

## 2020-07-21 PROCEDURE — 97140 MANUAL THERAPY 1/> REGIONS: CPT | Mod: GP | Performed by: PHYSICAL THERAPIST

## 2020-07-21 NOTE — INITIAL ASSESSMENTS
07/21/20 1200   General Information   Type of Visit Initial OP Ortho PT Evaluation   Start of Care Date 07/21/20   Referring Physician LESTER Turner   Patient/Family Goals Statement walk with no pain, bike, garden with less pain.    Orders Evaluate and Treat   Certification Required? Yes   Medical Diagnosis Hip pain, right M25.558    Surgical/Medical history reviewed Yes   Precautions/Limitations no known precautions/limitations   General Information Comments has flare up of back/SI/hip pain since this early summer,    Body Part(s)   Body Part(s) Lumbar Spine/SI   Presentation and Etiology   Pertinent history of current problem (include personal factors and/or comorbidities that impact the POC) patient thinks flared up pain from biking and working in the garden. had episode of hip locking/catching when biking. some radicular pain.    Impairments A. Pain;D. Decreased ROM   Functional Limitations perform activities of daily living;perform desired leisure / sports activities   Symptom Location right hip/ITB, Right SI, L5/S1   Chronicity Recurrent   Pain rating (0-10 point scale) Best (/10);Worst (/10)   Best (/10) 3   Worst (/10) 7   Pain quality C. Aching;D. Burning   Frequency of pain/symptoms A. Constant   Pain/symptoms are: Worse during the day   Pain/symptoms exacerbated by A. Sitting;G. Certain positions   Pain/symptoms eased by F. Certain positions;G. Heat;I. OTC medication(s)   Prior Level of Function   Prior Level of Function-Mobility no limits   Current Level of Function   Current Community Support Family/friend caregiver   Patient role/employment history F. Retired   Living environment [place]   Fall Risk Screen   Fall screen completed by PT   Is patient a fall risk? No   Abuse Screen (yes response referral indicated)   Feels Unsafe at Home or Work/School no   Feels Threatened by Someone no   Does Anyone Try to Keep You From Having Contact with Others or Doing Things Outside Your Home? no   Physical  Signs of Abuse Present no   Lumbar Spine/SI Objective Findings   Gait/Locomotion walks with slight limp, antalgic due to hip/bakc pain.   Balance/Proprioception (Single Leg Stance) fair   Flexion ROM good   Extension ROM good   Repeated Extension-Standing ROM neg   Repeated Flexion-Standing ROM neg   Pelvic Screen upslip left, anterior right.   Hip Screen no noted pain with deep flexion ro scour.   Hip Abduction Strength 4   Hip Adduction Strength 5   Hip Extension Strength 4 R, 4+ L   Hamstring Flexibility mild tight hams   Quadricep Flexibility mild tight   Piriformis Flexibility mild tight   Prone Instability Test neg   Slump Test neg   Spring Test neg   Segmental Mobility tight L5/S1   Sensation Testing intact   Palpation mild tight right side paraspinals, tight T6/7.    Planned Therapy Interventions   Planned Therapy Interventions joint mobilization;manual therapy;neuromuscular re-education;ROM;strengthening;stretching   Planned Modality Interventions   Planned Modality Interventions Cryotherapy;Hot packs;Ultrasound   Clinical Impression   Criteria for Skilled Therapeutic Interventions Met yes, treatment indicated   PT Diagnosis SI/pelvic dysfunction, L5/S1 pain/dysfunction,   Influenced by the following impairments decreasd tolerance to activities   Functional limitations due to impairments walk, gait, bike, garden   Clinical Presentation Stable/Uncomplicated   Clinical Decision Making (Complexity) Moderate complexity   Therapy Frequency 2 times/Week   Predicted Duration of Therapy Intervention (days/wks) 8 wks   Risk & Benefits of therapy have been explained Yes   Patient, Family & other staff in agreement with plan of care Yes   Clinical Impression Comments OA, DDD/DJD, sacral pelvic dysf.   ORTHO GOALS   PT Ortho Eval Goals 1;2;3   Ortho Goal 1   Goal Identifier walk   Goal Description decreased limp to no limp when walking up to 300ft.    Target Date 08/28/20   Ortho Goal 2   Goal Identifier hip ext   Goal  Description hip ext strength improved to 4+ bilat for improved lumbar stabilyt.   Target Date 09/25/20   Ortho Goal 3   Goal Identifier bike   Goal Description patient will have improved tolerance to biking to allow biking over 20 min with pain below 3/10   Target Date 08/28/20   Total Evaluation Time   PT Eval, Moderate Complexity Minutes (09389) 25   Therapy Certification   Certification date from 07/21/20   Certification date to 09/25/20   Medical Diagnosis hip pain

## 2020-07-21 NOTE — PLAN OF CARE
[facility]          OUTPATIENT PHYSICAL THERAPY ORTHOPEDIC EVALUATION  PLAN OF TREATMENT FOR OUTPATIENT REHABILITATION  (COMPLETE FOR INITIAL CLAIMS ONLY)  Patient's Last Name, First Name, M.I.  YOB: 1945  CarSandro Albert    Provider s Name:  [facility]   Medical Record No.  [id number]   Start of Care Date:  07/21/20   Onset Date:      Type:     _X__PT   ___OT   ___SLP Medical Diagnosis:  hip pain     PT Diagnosis:  SI/pelvic dysfunction, L5/S1 pain/dysfunction,   Visits from SOC:  1      _________________________________________________________________________________  Plan of Treatment/Functional Goals:  joint mobilization, manual therapy, neuromuscular re-education, ROM, strengthening, stretching     Cryotherapy, Hot packs, Ultrasound     Goals  Goal Identifier: walk  Goal Description: decreased limp to no limp when walking up to 300ft.   Target Date: 08/28/20    Goal Identifier: hip ext  Goal Description: hip ext strength improved to 4+ bilat for improved lumbar stabilyt.  Target Date: 09/25/20    Goal Identifier: bike  Goal Description: patient will have improved tolerance to biking to allow biking over 20 min with pain below 3/10  Target Date: 08/28/20                                    Therapy Frequency:  2 times/Week  Predicted Duration of Therapy Intervention:  8 wks    Tayo Jimenez, PT                 I CERTIFY THE NEED FOR THESE SERVICES FURNISHED UNDER        THIS PLAN OF TREATMENT AND WHILE UNDER MY CARE     (Physician co-signature of this document indicates review and certification of the therapy plan).                       Certification Date From:  07/21/20   Certification Date To:  09/25/20    Referring Provider:  LESTER Turner    Initial Assessment        See Epic Evaluation Start of Care Date: 07/21/20

## 2020-07-23 ENCOUNTER — HOSPITAL ENCOUNTER (OUTPATIENT)
Dept: PHYSICAL THERAPY | Facility: OTHER | Age: 75
Setting detail: THERAPIES SERIES
End: 2020-07-23
Attending: INTERNAL MEDICINE
Payer: MEDICARE

## 2020-07-23 PROCEDURE — 97110 THERAPEUTIC EXERCISES: CPT | Mod: GP | Performed by: PHYSICAL THERAPIST

## 2020-07-23 PROCEDURE — 97035 APP MDLTY 1+ULTRASOUND EA 15: CPT | Mod: GP | Performed by: PHYSICAL THERAPIST

## 2020-07-23 PROCEDURE — 97140 MANUAL THERAPY 1/> REGIONS: CPT | Mod: GP | Performed by: PHYSICAL THERAPIST

## 2020-07-30 ENCOUNTER — HOSPITAL ENCOUNTER (OUTPATIENT)
Dept: PHYSICAL THERAPY | Facility: OTHER | Age: 75
Setting detail: THERAPIES SERIES
End: 2020-07-30
Attending: INTERNAL MEDICINE
Payer: MEDICARE

## 2020-07-30 PROCEDURE — 97140 MANUAL THERAPY 1/> REGIONS: CPT | Mod: GP | Performed by: PHYSICAL THERAPIST

## 2020-07-30 PROCEDURE — 97110 THERAPEUTIC EXERCISES: CPT | Mod: GP | Performed by: PHYSICAL THERAPIST

## 2020-07-30 PROCEDURE — 97035 APP MDLTY 1+ULTRASOUND EA 15: CPT | Mod: GP | Performed by: PHYSICAL THERAPIST

## 2020-07-30 NOTE — PLAN OF CARE
[facility]          OUTPATIENT PHYSICAL THERAPY ORTHOPEDIC EVALUATION  PLAN OF TREATMENT FOR OUTPATIENT REHABILITATION  (COMPLETE FOR INITIAL CLAIMS ONLY)  Patient's Last Name, First Name, M.I.  YOB: 1945  CaraSndro Albert    Provider s Name:  [facility]   Medical Record No.  [id number]   Start of Care Date:  07/21/20   Onset Date:      Type:     _X__PT   ___OT   ___SLP Medical Diagnosis:  hip pain     PT Diagnosis:  SI/pelvic dysfunction, L5/S1 pain/dysfunction,   Visits from SOC:  1      _________________________________________________________________________________  Plan of Treatment/Functional Goals:  joint mobilization, manual therapy, neuromuscular re-education, ROM, strengthening, stretching     Cryotherapy, Hot packs, Ultrasound     Goals  Goal Identifier: walk  Goal Description: decreased limp to no limp when walking up to 300ft.   Target Date: 08/28/20    Goal Identifier: hip ext  Goal Description: hip ext strength improved to 4+ bilat for improved lumbar stabilyt.  Target Date: 09/25/20    Goal Identifier: bike  Goal Description: patient will have improved tolerance to biking to allow biking over 20 min with pain below 3/10  Target Date: 08/28/20           Therapy Frequency:  2 times/Week  Predicted Duration of Therapy Intervention:  8 wks    Tayo Jimenez, PT                 I CERTIFY THE NEED FOR THESE SERVICES FURNISHED UNDER        THIS PLAN OF TREATMENT AND WHILE UNDER MY CARE     (Physician co-signature of this document indicates review and certification of the therapy plan).                       Certification Date From:  07/21/20   Certification Date To:  09/25/20    Referring Provider:  LESTER Turner    Initial Assessment        See Epic Evaluation Start of Care Date: 07/21/20

## 2020-08-04 ENCOUNTER — HOSPITAL ENCOUNTER (OUTPATIENT)
Dept: PHYSICAL THERAPY | Facility: OTHER | Age: 75
Setting detail: THERAPIES SERIES
End: 2020-08-04
Attending: INTERNAL MEDICINE
Payer: MEDICARE

## 2020-08-04 PROCEDURE — 97140 MANUAL THERAPY 1/> REGIONS: CPT | Mod: GP | Performed by: PHYSICAL THERAPIST

## 2020-08-04 PROCEDURE — 97110 THERAPEUTIC EXERCISES: CPT | Mod: GP | Performed by: PHYSICAL THERAPIST

## 2020-08-11 ENCOUNTER — HOSPITAL ENCOUNTER (OUTPATIENT)
Dept: PHYSICAL THERAPY | Facility: OTHER | Age: 75
Setting detail: THERAPIES SERIES
End: 2020-08-11
Attending: INTERNAL MEDICINE
Payer: MEDICARE

## 2020-08-11 PROCEDURE — 97110 THERAPEUTIC EXERCISES: CPT | Mod: GP | Performed by: PHYSICAL THERAPIST

## 2020-08-11 PROCEDURE — 97035 APP MDLTY 1+ULTRASOUND EA 15: CPT | Mod: GP | Performed by: PHYSICAL THERAPIST

## 2020-08-11 PROCEDURE — 97140 MANUAL THERAPY 1/> REGIONS: CPT | Mod: GP | Performed by: PHYSICAL THERAPIST

## 2020-08-17 DIAGNOSIS — I10 ESSENTIAL HYPERTENSION: Primary | ICD-10-CM

## 2020-08-18 ENCOUNTER — HOSPITAL ENCOUNTER (OUTPATIENT)
Dept: PHYSICAL THERAPY | Facility: OTHER | Age: 75
Setting detail: THERAPIES SERIES
End: 2020-08-18
Attending: INTERNAL MEDICINE
Payer: MEDICARE

## 2020-08-18 PROCEDURE — 97110 THERAPEUTIC EXERCISES: CPT | Mod: GP | Performed by: PHYSICAL THERAPIST

## 2020-08-18 PROCEDURE — 97140 MANUAL THERAPY 1/> REGIONS: CPT | Mod: GP | Performed by: PHYSICAL THERAPIST

## 2020-08-18 RX ORDER — RAMIPRIL 2.5 MG/1
2.5 CAPSULE ORAL DAILY
Qty: 90 CAPSULE | Refills: 0 | Status: SHIPPED | OUTPATIENT
Start: 2020-08-18 | End: 2020-09-21

## 2020-08-18 NOTE — TELEPHONE ENCOUNTER
sent Rx request for the following:   ramipril (ALTACE) 2.5 MG capsule   Sig: Take 1 capsule (2.5 mg) by mouth daily - Oral     Last Prescription Date:   09/10/2019  Last Fill Qty/Refills:         90, R-3    Last Office Visit:              09/10/2019   Future Office visit:           none  Prescription refilled per RN Medication Refill Policy.................... Charlotte Pierce RN ....................  8/18/2020   1:23 PM

## 2020-08-20 ENCOUNTER — HOSPITAL ENCOUNTER (OUTPATIENT)
Dept: PHYSICAL THERAPY | Facility: OTHER | Age: 75
Setting detail: THERAPIES SERIES
End: 2020-08-20
Attending: INTERNAL MEDICINE
Payer: MEDICARE

## 2020-08-20 ENCOUNTER — TELEPHONE (OUTPATIENT)
Dept: PEDIATRICS | Facility: OTHER | Age: 75
End: 2020-08-20

## 2020-08-20 DIAGNOSIS — E03.9 ACQUIRED HYPOTHYROIDISM: Primary | Chronic | ICD-10-CM

## 2020-08-20 DIAGNOSIS — E78.2 MIXED HYPERLIPIDEMIA: ICD-10-CM

## 2020-08-20 PROCEDURE — 97110 THERAPEUTIC EXERCISES: CPT | Mod: GP | Performed by: PHYSICAL THERAPIST

## 2020-08-20 PROCEDURE — 97035 APP MDLTY 1+ULTRASOUND EA 15: CPT | Mod: GP | Performed by: PHYSICAL THERAPIST

## 2020-08-20 PROCEDURE — 97140 MANUAL THERAPY 1/> REGIONS: CPT | Mod: GP | Performed by: PHYSICAL THERAPIST

## 2020-08-20 NOTE — TELEPHONE ENCOUNTER
Patient is coming in 8/24/2020 to have lab work done for [facility]. He is wanting to also have lipid, TSH, and free T4 checked as it has been over 1 year since he's last had it checked. Orders pending if ok.    Rochelle Alvarez, CMA on 8/20/2020 at 4:53 PM

## 2020-08-20 NOTE — TELEPHONE ENCOUNTER
Pt has upcoming appt for labs ordered by [facility].  He would like to get some additional lab orders added to that appt

## 2020-08-24 ENCOUNTER — TELEPHONE (OUTPATIENT)
Dept: GASTROENTEROLOGY | Facility: CLINIC | Age: 75
End: 2020-08-24

## 2020-08-24 DIAGNOSIS — E78.2 MIXED HYPERLIPIDEMIA: ICD-10-CM

## 2020-08-24 DIAGNOSIS — R79.89 ABNORMAL LFTS (LIVER FUNCTION TESTS): Primary | ICD-10-CM

## 2020-08-24 DIAGNOSIS — E03.9 ACQUIRED HYPOTHYROIDISM: Chronic | ICD-10-CM

## 2020-08-24 LAB
CHOLEST SERPL-MCNC: 121 MG/DL
HDLC SERPL-MCNC: 48 MG/DL (ref 23–92)
LDLC SERPL CALC-MCNC: 58 MG/DL
NONHDLC SERPL-MCNC: 73 MG/DL
T4 FREE SERPL-MCNC: 1.1 NG/DL (ref 0.6–1.6)
TRIGL SERPL-MCNC: 74 MG/DL
TSH SERPL DL<=0.05 MIU/L-ACNC: 1.16 IU/ML (ref 0.34–5.6)

## 2020-08-24 PROCEDURE — 84439 ASSAY OF FREE THYROXINE: CPT | Mod: ZL | Performed by: INTERNAL MEDICINE

## 2020-08-24 PROCEDURE — 80061 LIPID PANEL: CPT | Mod: ZL | Performed by: INTERNAL MEDICINE

## 2020-08-24 PROCEDURE — 84443 ASSAY THYROID STIM HORMONE: CPT | Mod: ZL | Performed by: INTERNAL MEDICINE

## 2020-08-24 PROCEDURE — 36415 COLL VENOUS BLD VENIPUNCTURE: CPT | Mod: ZL | Performed by: INTERNAL MEDICINE

## 2020-08-24 NOTE — TELEPHONE ENCOUNTER
[facility]    Phone Message    May a detailed message be left on voicemail: yes     Reason for Call: Other: Melania called in for new lab orders for this patient please have Kaylen Wise place new orders. Thank you.     Action Taken: Message routed to:  [facility] ([facility]): hep    Travel Screening: Not Applicable

## 2020-08-25 DIAGNOSIS — R79.89 ABNORMAL LFTS (LIVER FUNCTION TESTS): ICD-10-CM

## 2020-08-25 LAB
ALBUMIN SERPL-MCNC: 4.1 G/DL (ref 3.5–5.7)
ALP SERPL-CCNC: 116 U/L (ref 34–104)
ALT SERPL W P-5'-P-CCNC: 25 U/L (ref 7–52)
AST SERPL W P-5'-P-CCNC: 27 U/L (ref 13–39)
BILIRUB DIRECT SERPL-MCNC: 0.2 MG/DL (ref 0–0.2)
BILIRUB SERPL-MCNC: 2.2 MG/DL (ref 0.3–1)
INR PPP: 1.04 (ref 0–1.3)
PROT SERPL-MCNC: 6.7 G/DL (ref 6.4–8.9)

## 2020-08-25 PROCEDURE — 85610 PROTHROMBIN TIME: CPT | Mod: ZL | Performed by: PHYSICIAN ASSISTANT

## 2020-08-25 PROCEDURE — 80076 HEPATIC FUNCTION PANEL: CPT | Mod: ZL | Performed by: PHYSICIAN ASSISTANT

## 2020-08-25 PROCEDURE — 36415 COLL VENOUS BLD VENIPUNCTURE: CPT | Mod: ZL | Performed by: PHYSICIAN ASSISTANT

## 2020-09-08 ENCOUNTER — HOSPITAL ENCOUNTER (OUTPATIENT)
Dept: PHYSICAL THERAPY | Facility: OTHER | Age: 75
Setting detail: THERAPIES SERIES
End: 2020-09-08
Attending: INTERNAL MEDICINE
Payer: MEDICARE

## 2020-09-08 PROCEDURE — 97035 APP MDLTY 1+ULTRASOUND EA 15: CPT | Mod: GP | Performed by: PHYSICAL THERAPIST

## 2020-09-08 PROCEDURE — 97140 MANUAL THERAPY 1/> REGIONS: CPT | Mod: GP | Performed by: PHYSICAL THERAPIST

## 2020-09-08 PROCEDURE — 97110 THERAPEUTIC EXERCISES: CPT | Mod: GP | Performed by: PHYSICAL THERAPIST

## 2020-09-10 ENCOUNTER — HOSPITAL ENCOUNTER (OUTPATIENT)
Dept: PHYSICAL THERAPY | Facility: OTHER | Age: 75
Setting detail: THERAPIES SERIES
End: 2020-09-10
Attending: INTERNAL MEDICINE
Payer: MEDICARE

## 2020-09-10 PROCEDURE — 97140 MANUAL THERAPY 1/> REGIONS: CPT | Mod: GP | Performed by: PHYSICAL THERAPIST

## 2020-09-10 PROCEDURE — 97110 THERAPEUTIC EXERCISES: CPT | Mod: GP | Performed by: PHYSICAL THERAPIST

## 2020-09-10 PROCEDURE — 97035 APP MDLTY 1+ULTRASOUND EA 15: CPT | Mod: GP | Performed by: PHYSICAL THERAPIST

## 2020-09-21 ENCOUNTER — OFFICE VISIT (OUTPATIENT)
Dept: PEDIATRICS | Facility: OTHER | Age: 75
End: 2020-09-21
Attending: INTERNAL MEDICINE
Payer: COMMERCIAL

## 2020-09-21 VITALS
HEART RATE: 77 BPM | WEIGHT: 157.2 LBS | HEIGHT: 68 IN | SYSTOLIC BLOOD PRESSURE: 108 MMHG | TEMPERATURE: 96.9 F | BODY MASS INDEX: 23.82 KG/M2 | DIASTOLIC BLOOD PRESSURE: 78 MMHG | OXYGEN SATURATION: 99 % | RESPIRATION RATE: 16 BRPM

## 2020-09-21 DIAGNOSIS — I10 ESSENTIAL HYPERTENSION: Primary | ICD-10-CM

## 2020-09-21 DIAGNOSIS — Z12.11 SCREEN FOR COLON CANCER: ICD-10-CM

## 2020-09-21 DIAGNOSIS — N17.9 ACUTE RENAL FAILURE SUPERIMPOSED ON STAGE 3 CHRONIC KIDNEY DISEASE, UNSPECIFIED ACUTE RENAL FAILURE TYPE: ICD-10-CM

## 2020-09-21 DIAGNOSIS — D49.0 IPMN (INTRADUCTAL PAPILLARY MUCINOUS NEOPLASM): Chronic | ICD-10-CM

## 2020-09-21 DIAGNOSIS — N18.3 ACUTE RENAL FAILURE SUPERIMPOSED ON STAGE 3 CHRONIC KIDNEY DISEASE, UNSPECIFIED ACUTE RENAL FAILURE TYPE: ICD-10-CM

## 2020-09-21 DIAGNOSIS — I25.10 ASCVD (ARTERIOSCLEROTIC CARDIOVASCULAR DISEASE): ICD-10-CM

## 2020-09-21 DIAGNOSIS — E03.9 ACQUIRED HYPOTHYROIDISM: Chronic | ICD-10-CM

## 2020-09-21 LAB
ANION GAP SERPL CALCULATED.3IONS-SCNC: 8 MMOL/L (ref 3–14)
BUN SERPL-MCNC: 28 MG/DL (ref 7–25)
CALCIUM SERPL-MCNC: 9.5 MG/DL (ref 8.6–10.3)
CHLORIDE SERPL-SCNC: 105 MMOL/L (ref 98–107)
CO2 SERPL-SCNC: 28 MMOL/L (ref 21–31)
CREAT SERPL-MCNC: 1.32 MG/DL (ref 0.7–1.3)
GFR SERPL CREATININE-BSD FRML MDRD: 53 ML/MIN/{1.73_M2}
GLUCOSE SERPL-MCNC: 76 MG/DL (ref 70–105)
POTASSIUM SERPL-SCNC: 3.9 MMOL/L (ref 3.5–5.1)
SODIUM SERPL-SCNC: 141 MMOL/L (ref 134–144)

## 2020-09-21 PROCEDURE — 80048 BASIC METABOLIC PNL TOTAL CA: CPT | Mod: ZL | Performed by: INTERNAL MEDICINE

## 2020-09-21 PROCEDURE — 99214 OFFICE O/P EST MOD 30 MIN: CPT | Performed by: INTERNAL MEDICINE

## 2020-09-21 PROCEDURE — G0463 HOSPITAL OUTPT CLINIC VISIT: HCPCS

## 2020-09-21 PROCEDURE — 90662 IIV NO PRSV INCREASED AG IM: CPT

## 2020-09-21 PROCEDURE — 36415 COLL VENOUS BLD VENIPUNCTURE: CPT | Mod: ZL | Performed by: INTERNAL MEDICINE

## 2020-09-21 PROCEDURE — G0463 HOSPITAL OUTPT CLINIC VISIT: HCPCS | Mod: 25

## 2020-09-21 PROCEDURE — G0008 ADMIN INFLUENZA VIRUS VAC: HCPCS

## 2020-09-21 RX ORDER — RAMIPRIL 2.5 MG/1
2.5 CAPSULE ORAL DAILY
Qty: 90 CAPSULE | Refills: 3 | Status: SHIPPED | OUTPATIENT
Start: 2020-09-21 | End: 2021-09-23

## 2020-09-21 RX ORDER — METOPROLOL SUCCINATE 50 MG/1
50 TABLET, EXTENDED RELEASE ORAL DAILY
Qty: 90 TABLET | Refills: 3 | Status: SHIPPED | OUTPATIENT
Start: 2020-09-21 | End: 2021-09-23

## 2020-09-21 RX ORDER — ACETAMINOPHEN 500 MG
500-1000 TABLET ORAL EVERY 6 HOURS PRN
COMMUNITY

## 2020-09-21 RX ORDER — LEVOTHYROXINE SODIUM 88 UG/1
88 TABLET ORAL DAILY
Qty: 90 TABLET | Refills: 3 | Status: SHIPPED | OUTPATIENT
Start: 2020-09-21 | End: 2021-09-23

## 2020-09-21 RX ORDER — ATORVASTATIN CALCIUM 10 MG/1
10 TABLET, FILM COATED ORAL DAILY
Qty: 90 TABLET | Refills: 3 | Status: SHIPPED | OUTPATIENT
Start: 2020-09-21 | End: 2021-09-23

## 2020-09-21 ASSESSMENT — PAIN SCALES - GENERAL: PAINLEVEL: NO PAIN (0)

## 2020-09-21 ASSESSMENT — MIFFLIN-ST. JEOR: SCORE: 1417.55

## 2020-09-21 NOTE — NURSING NOTE
"Chief Complaint   Patient presents with     Recheck Medication     Results     Patient is here for a recheck on medications and follow up on lab results.     Initial /78 (BP Location: Right arm, Patient Position: Sitting, Cuff Size: Adult Regular)   Pulse 77   Temp 96.9  F (36.1  C) (Tympanic)   Resp 16   Ht 1.727 m (5' 8\")   Wt 71.3 kg (157 lb 3.2 oz)   SpO2 99%   BMI 23.90 kg/m   Estimated body mass index is 23.9 kg/m  as calculated from the following:    Height as of this encounter: 1.727 m (5' 8\").    Weight as of this encounter: 71.3 kg (157 lb 3.2 oz).  Medication Reconciliation: complete    Rochelle Alvarez MA     Immunization Documentation    Prior to Immunization administration, verified patients identity using patient's name and date of birth. Please see IMMUNIZATIONS  and order for additional information.  Patient / Parent instructed to remain in clinic for 15 minutes and report any adverse reaction to staff immediately.    Was the entire amount of vaccines given used? Yes    Rochelle Alvarez MA  9/21/2020   11:12 AM    "

## 2020-09-21 NOTE — PATIENT INSTRUCTIONS
-- Try to use fluticasone and loratadine consistently   -- Add nasal saline irrigation 1-2x/day   -- Try omeprazole for 2 weeks   -- If your ear is nor improving would refer to Dr. Perkins in [place]     -- Flu shot today   -- Get the new shingles vaccine series from a nurse-only visit, pharmacy or [facility] ([facility] RN).

## 2020-09-21 NOTE — PROGRESS NOTES
Subjective  Sandro Yoon is a 75 year old male who presents for medication management.  He has a history of atherosclerotic cardiovascular disease and continues on atorvastatin, metoprolol, ramipril.  Has not had any chest pains.  Is biking and walking frequently during the COVID-19 pandemic.  History of hypothyroidism which is stable on levothyroxine.  His right ear feels plugged sometimes.  He notices it when he tries to sing in choir.  He has had rhinorrhea and postnasal drainage.  His heartburn has been worse lately.  He is tried to target it with a half of a Tums.  Infrequent use of Pepcid.  The left side of his back has felt slightly numb for about a month.  He thought maybe he had shingles.  He never developed a rash.  No injury, trauma or fall.  There was no pain in the area.  His hips are significantly better after doing physical therapy with [facility].    Problem List/PMH: reviewed in EMR, and made relevant updates today.  Medications: reviewed in EMR, and made relevant updates today.  Allergies: reviewed in EMR, and made relevant updates today.    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  Likes to cross country ski and bike.  .  Lives in town.     I reviewed social history and made relevant updates today.    Family Hx:   Family History   Problem Relation Age of Onset     Heart Disease Mother          at 94     Heart Disease Father      Prostate Cancer Father 80     Other - See Comments Brother         Sudden cardiac death     Heart Disease Brother      Prostate Cancer Brother 40     Other - See Comments Brother         CABG sdc young age     Prostate Cancer Brother 65     Colon Cancer No family hx of          "Cancer-colon     Anesthesia Reaction No family hx of         Anesthesia Problem     Blood Disease No family hx of         Blood Disease,no bleeding or clotting       Objective  Vitals: reviewed in EMR.  /78 (BP Location: Right arm, Patient Position: Sitting, Cuff Size: Adult Regular)   Pulse 77   Temp 96.9  F (36.1  C) (Tympanic)   Resp 16   Ht 1.727 m (5' 8\")   Wt 71.3 kg (157 lb 3.2 oz)   SpO2 99%   BMI 23.90 kg/m      Gen: Pleasant male, NAD.  HEENT: MMM, no OP erythema.  Scant cerumen bilaterally.  Tympanic membranes are normal.  Neck: Supple, no JVD, no bruits.  CV: RRR no m/r/g.   Pulm: CTAB no w/r/r  GI: Soft, NT, ND. No HSM. No masses. Bowel sounds present.  Neuro: Grossly intact  Msk: No lower extremity edema.  Skin: No concerning lesions.  Psychiatric: Normal affect and insight. Does not appear anxious or depressed.    Labs:  Lab Results   Component Value Date    WBC 6.6 09/23/2019    HGB 14.6 09/23/2019    HCT 44.9 09/23/2019     09/23/2019    CHOL 121 08/24/2020    TRIG 74 08/24/2020    HDL 48 08/24/2020    ALT 25 08/25/2020    AST 27 08/25/2020     09/23/2019    BUN 22 09/23/2019    CO2 22 09/23/2019    INR 1.04 08/25/2020       Glucose   Date Value Ref Range Status   09/23/2019 96 70 - 99 mg/dL Final   09/10/2019 92 70 - 105 mg/dL Final     Hemoglobin A1C   Date Value Ref Range Status   04/16/2019 5.7 4.0 - 6.0 % Final     Creatinine   Date Value Ref Range Status   09/23/2019 1.05 0.66 - 1.25 mg/dL Final   09/10/2019 1.50 (H) 0.70 - 1.30 mg/dL Final     LDL Cholesterol Calculated   Date Value Ref Range Status   08/24/2020 58 <100 mg/dL Final     Comment:     Desirable:       <100 mg/dl     Thyrotropin   Date Value Ref Range Status   08/24/2020 1.16 0.34 - 5.60 IU/mL Final   06/26/2019 0.22 (L) 0.34 - 5.60 IU/mL Final   04/16/2019 <0.20 (L) 0.34 - 5.60 IU/mL Final               Assessment    ICD-10-CM    1. Essential hypertension  I10 Basic metabolic panel     ramipril (ALTACE) " 2.5 MG capsule     metoprolol succinate ER (TOPROL-XL) 50 MG 24 hr tablet   2. Screen for colon cancer  Z12.11 ABSTRACT COLOGUARD-NO CHARGE   3. Acquired hypothyroidism  E03.9 levothyroxine (SYNTHROID/LEVOTHROID) 88 MCG tablet   4. ASCVD (arteriosclerotic cardiovascular disease)  I25.10 atorvastatin (LIPITOR) 10 MG tablet   5. IPMN (intraductal papillary mucinous neoplasm)  D49.0      Orders Placed This Encounter   Procedures     HC FLU VACCINE, INCREASED ANTIGEN, PRESV FREE     ABSTRACT COLOGUARD-NO CHARGE     Basic metabolic panel       Alk phos and bili levels remain above normal however significantly improved compared to prior.  History of IPMN and plans to follow with gastroenterology.    Plan   -- Expected clinical course discussed   -- Medications and their side effects discussed  Patient Instructions    -- Try to use fluticasone and loratadine consistently   -- Add nasal saline irrigation 1-2x/day   -- Try omeprazole for 2 weeks   -- If your ear is nor improving would refer to Dr. Perkins in [place]     -- Flu shot today   -- Get the new shingles vaccine series from a nurse-only visit, pharmacy or [facility] ([facility] RN).        Return in about 1 year (around 9/21/2021), or if symptoms worsen or fail to improve, for medication management.    Signed, Guero Turner MD, FAAP, FACP  Internal Medicine & Pediatrics

## 2020-10-16 LAB — COLOGUARD-ABSTRACT: NEGATIVE

## 2021-02-17 ENCOUNTER — IMMUNIZATION (OUTPATIENT)
Dept: FAMILY MEDICINE | Facility: OTHER | Age: 76
End: 2021-02-17
Attending: FAMILY MEDICINE
Payer: MEDICARE

## 2021-02-17 PROCEDURE — 91300 PR COVID VAC PFIZER DIL RECON 30 MCG/0.3 ML IM: CPT

## 2021-03-04 ENCOUNTER — TRANSFERRED RECORDS (OUTPATIENT)
Dept: HEALTH INFORMATION MANAGEMENT | Facility: OTHER | Age: 76
End: 2021-03-04

## 2021-03-04 LAB — EJECTION FRACTION: 60 %

## 2021-03-10 ENCOUNTER — IMMUNIZATION (OUTPATIENT)
Dept: FAMILY MEDICINE | Facility: OTHER | Age: 76
End: 2021-03-10
Attending: FAMILY MEDICINE
Payer: MEDICARE

## 2021-03-10 PROCEDURE — 91300 PR COVID VAC PFIZER DIL RECON 30 MCG/0.3 ML IM: CPT

## 2021-03-16 ENCOUNTER — TELEPHONE (OUTPATIENT)
Dept: GASTROENTEROLOGY | Facility: CLINIC | Age: 76
End: 2021-03-16

## 2021-03-16 DIAGNOSIS — K86.2 PANCREAS CYST: Primary | ICD-10-CM

## 2021-03-16 NOTE — TELEPHONE ENCOUNTER
Post EUS (5/13/19) with Dr. Traore: Follow-up     Post procedure recommendations: Interval MRI in 2 years to ensure stable cyst size    - Follow up with your established providers as scheduled      Attempted to contact Pt to help him get scheduled for MRI/MRCP.    No answer. Left VM for Pt to call back.    Johnathon Marin LPN

## 2021-03-22 ENCOUNTER — TELEPHONE (OUTPATIENT)
Dept: GASTROENTEROLOGY | Facility: CLINIC | Age: 76
End: 2021-03-22

## 2021-03-22 NOTE — TELEPHONE ENCOUNTER
Called Pt again to follow up on MRI coming up due in May. Per Pt, he will call  in [facility] to schedule an appointment. Discuss follow up will depend on MRI results.    Johnathon Marin LPN

## 2021-04-25 ENCOUNTER — HEALTH MAINTENANCE LETTER (OUTPATIENT)
Age: 76
End: 2021-04-25

## 2021-05-14 ENCOUNTER — HOSPITAL ENCOUNTER (OUTPATIENT)
Dept: MRI IMAGING | Facility: OTHER | Age: 76
Discharge: HOME | End: 2021-05-14
Attending: INTERNAL MEDICINE | Admitting: FAMILY MEDICINE
Payer: MEDICARE

## 2021-05-14 ENCOUNTER — TRANSFERRED RECORDS (OUTPATIENT)
Dept: HEALTH INFORMATION MANAGEMENT | Facility: CLINIC | Age: 76
End: 2021-05-14

## 2021-05-14 DIAGNOSIS — K86.2 PANCREAS CYST: ICD-10-CM

## 2021-05-14 PROCEDURE — A9575 INJ GADOTERATE MEGLUMI 0.1ML: HCPCS | Performed by: INTERNAL MEDICINE

## 2021-05-14 PROCEDURE — 74183 MRI ABD W/O CNTR FLWD CNTR: CPT

## 2021-05-14 PROCEDURE — 255N000002 HC RX 255 OP 636: Performed by: INTERNAL MEDICINE

## 2021-05-14 RX ADMIN — GADOTERATE MEGLUMINE 14 ML: 376.9 INJECTION INTRAVENOUS at 08:26

## 2021-06-15 ENCOUNTER — OFFICE VISIT (OUTPATIENT)
Dept: PEDIATRICS | Facility: OTHER | Age: 76
End: 2021-06-15
Attending: INTERNAL MEDICINE
Payer: MEDICARE

## 2021-06-15 VITALS
DIASTOLIC BLOOD PRESSURE: 64 MMHG | TEMPERATURE: 97.8 F | RESPIRATION RATE: 16 BRPM | WEIGHT: 162 LBS | OXYGEN SATURATION: 97 % | HEART RATE: 74 BPM | BODY MASS INDEX: 24.63 KG/M2 | SYSTOLIC BLOOD PRESSURE: 128 MMHG

## 2021-06-15 DIAGNOSIS — M76.31 IT BAND SYNDROME, RIGHT: ICD-10-CM

## 2021-06-15 DIAGNOSIS — Z11.59 ENCOUNTER FOR SCREENING FOR OTHER VIRAL DISEASES: ICD-10-CM

## 2021-06-15 DIAGNOSIS — Z77.21 EXPOSURE TO BLOOD: ICD-10-CM

## 2021-06-15 DIAGNOSIS — M54.16 LUMBAR RADICULOPATHY: Primary | ICD-10-CM

## 2021-06-15 LAB
ANION GAP SERPL CALCULATED.3IONS-SCNC: 10 MMOL/L (ref 6–17)
BUN SERPL-MCNC: 24 MG/DL (ref 7–25)
CALCIUM SERPL-MCNC: 9.5 MG/DL (ref 8.6–10.3)
CHLORIDE SERPL-SCNC: 106 MMOL/L (ref 98–107)
CO2 SERPL-SCNC: 24 MMOL/L (ref 21–31)
CREAT SERPL-MCNC: 1.18 MG/DL (ref 0.7–1.3)
GFR SERPL CREATININE-BSD FRML MDRD: NORMAL ML/MIN/{1.73_M2}
GLUCOSE SERPL-MCNC: 98 MG/DL (ref 70–105)
POTASSIUM SERPL-SCNC: 4 MMOL/L (ref 3.5–5.1)
SODIUM SERPL-SCNC: 140 MMOL/L (ref 134–144)

## 2021-06-15 PROCEDURE — 86706 HEP B SURFACE ANTIBODY: CPT | Mod: ZL | Performed by: INTERNAL MEDICINE

## 2021-06-15 PROCEDURE — 80048 BASIC METABOLIC PNL TOTAL CA: CPT | Mod: ZL | Performed by: INTERNAL MEDICINE

## 2021-06-15 PROCEDURE — 86803 HEPATITIS C AB TEST: CPT | Mod: ZL | Performed by: INTERNAL MEDICINE

## 2021-06-15 PROCEDURE — 87340 HEPATITIS B SURFACE AG IA: CPT | Mod: ZL | Performed by: INTERNAL MEDICINE

## 2021-06-15 PROCEDURE — 86704 HEP B CORE ANTIBODY TOTAL: CPT | Mod: ZL | Performed by: INTERNAL MEDICINE

## 2021-06-15 PROCEDURE — G0463 HOSPITAL OUTPT CLINIC VISIT: HCPCS | Performed by: INTERNAL MEDICINE

## 2021-06-15 PROCEDURE — 87389 HIV-1 AG W/HIV-1&-2 AB AG IA: CPT | Mod: ZL | Performed by: INTERNAL MEDICINE

## 2021-06-15 PROCEDURE — 36415 COLL VENOUS BLD VENIPUNCTURE: CPT | Mod: ZL | Performed by: INTERNAL MEDICINE

## 2021-06-15 PROCEDURE — 99213 OFFICE O/P EST LOW 20 MIN: CPT | Performed by: INTERNAL MEDICINE

## 2021-06-15 ASSESSMENT — PAIN SCALES - GENERAL: PAINLEVEL: MODERATE PAIN (5)

## 2021-06-15 NOTE — PATIENT INSTRUCTIONS
----- Message from Anahi Cruz sent at 9/29/2020 10:57 AM CDT -----  Regarding: FW: referral  Jacklyn referral for nexplanon approved, will scan in and send over to [facility]. Thanks  ----- Message -----  From: Francisco Callahan RN  Sent: 9/26/2020  12:13 PM CDT  To: Anahi Cruz  Subject: referral                                         Please place referral for Nexplanon. Thank you!       Back Pain    Modifiable Risk Factors    Maintain a healthy weight. Losing 5% can be very helpful.  A loss of 10 lbs, will result in 80 lbs of force lifted off of your back.    Core muscle strength. Keeping up with a physical therapy home exercise program, and/or regular yoga practice keeps your back healthy.    Don't use tobacco products.  People that smoke have more bulging discs, and they take longer to heal.    Try not to injure it. Be smart when doing activities that require bending, twisting and lifting such as gardening.     Treatments   -- Try Voltaren (diclofenac) gel. Apply topically to low back up to 4 times per day as needed for pain.   -- Okay to use NSAIDs by mouth, but shortest duration is best as they can hurt stomach and kidney  Example: Ibuprofen 600 mg (3 tablets) 3 times per day for 7 days, then as needed   -- Rest   -- Ice/heat whichever feels better   -- Topicals: Aspercreme/IcyHot, lidocaine, capsaicin   -- Schedule visit for physical therapy   -- Monitor for warning signs including foot drop, groin numbness, new incontinence or other concerns and return same day for evaluation   -- Otherwise, return if you are not improving over the next 4 weeks    Patient Education       Relieving Back Pain  Back pain is a common problem. You can strain back muscles by lifting too much weight or just by moving the wrong way. Back strain can be uncomfortable, even painful. And it can take weeks or months to improve. To help yourself feel better and prevent future back strains, try these tips.  Important: Don't give aspirin to children or teens without first discussing it with your child's healthcare provider.  Ice    Ice reduces muscle pain and swelling. It helps most during the first 24 to 48 hours after an injury.    Wrap an ice pack or a bag of frozen peas in a thin towel. Never put ice directly on your skin.    Place the ice where your back hurts the most.    Don t ice for more than 20 minutes at a  time.    You can use ice several times a day.  Medicines  Over-the-counter pain relievers include acetaminophen and anti-inflammatory medicines, which includes aspirin, naproxen, or ibuprofen. They can help ease discomfort. Some also reduce swelling.    Tell your healthcare provider about any medicines you are already taking.    Take medicines only as directed.  Manipulation and massage  Having manipulation by an osteopathic doctor or chiropractor may be helpful. Getting a massage also may help.   Heat  After the first 48 hours, heat can relax sore muscles and improve blood flow.    Try a warm bath or shower. Or use a heating pad set on low. To prevent a burn, keep a cloth between you and the heating pad.    Don t use a heating pad for more than 15 minutes at a time. Never sleep on a heating pad.  Date Last Reviewed: 6/1/2018 2000-2019 [employer]. [address] [place] [address], [place] [address]. All rights reserved. This information is not intended as a substitute for professional medical care. Always follow your healthcare professional's instructions.
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
## 2021-06-15 NOTE — NURSING NOTE
Chief Complaint   Patient presents with     Musculoskeletal Problem     Back and Hip Pain        Medication Reconciliation: completed   Katherine Doyle LPN  6/15/2021 3:22 PM

## 2021-06-16 LAB
HBV CORE AB SERPL QL IA: NONREACTIVE
HBV SURFACE AB SERPL IA-ACNC: 0.47 M[IU]/ML
HBV SURFACE AG SERPL QL IA: NONREACTIVE
HCV AB SERPL QL IA: NONREACTIVE
HIV 1+2 AB+HIV1 P24 AG SERPL QL IA: NONREACTIVE

## 2021-07-07 ENCOUNTER — HOSPITAL ENCOUNTER (OUTPATIENT)
Dept: PHYSICAL THERAPY | Facility: OTHER | Age: 76
Setting detail: THERAPIES SERIES
End: 2021-07-07
Attending: INTERNAL MEDICINE
Payer: MEDICARE

## 2021-07-07 DIAGNOSIS — M76.31 IT BAND SYNDROME, RIGHT: ICD-10-CM

## 2021-07-07 DIAGNOSIS — M54.16 LUMBAR RADICULOPATHY: ICD-10-CM

## 2021-07-07 PROCEDURE — 97110 THERAPEUTIC EXERCISES: CPT | Mod: GP | Performed by: PHYSICAL THERAPIST

## 2021-07-07 PROCEDURE — 97162 PT EVAL MOD COMPLEX 30 MIN: CPT | Mod: GP | Performed by: PHYSICAL THERAPIST

## 2021-07-07 NOTE — PROGRESS NOTES
[facility]          OUTPATIENT PHYSICAL THERAPY ORTHOPEDIC EVALUATION  PLAN OF TREATMENT FOR OUTPATIENT REHABILITATION  (COMPLETE FOR INITIAL CLAIMS ONLY)  Patient's Last Name, First Name, M.I.  YOB: 1945  Sandro Yoon    Provider s Name:  [facility]   Medical Record No.  [id number]   Start of Care Date:  07/07/21   Onset Date:      Type:     _X__PT   ___OT   ___SLP Medical Diagnosis:        PT Diagnosis:  SI joint dysfunction lumbar spine disc irritation/radiculopathy.  IT band pain   Visits from SOC:  1      _________________________________________________________________________________  Plan of Treatment/Functional Goals:  joint mobilization, manual therapy, neuromuscular re-education, ROM, strengthening, stretching     Cryotherapy, Hot packs, Electrical stimulation, Ultrasound, Traction  Per PT judgement and pt need.   Goals  Goal Identifier: Pain  Goal Description: Patient report no more than 4 out of 10 pain with sitting or walking.  Target Date: 08/27/21    Goal Identifier: Home exercise program  Goal Description: Patient will maintain home exercise program at least 3 times a week to reduce pain and improve mobility  Target Date: 08/27/21    Goal Identifier: Sitting  Goal Description: Patient reports that he can sit car ride for up to 2 hours while maintaining good posture and reduction of pain more than 4 out of 10  Target Date: 09/10/21           Therapy Frequency:     Predicted Duration of Therapy Intervention:  1-2 times a week for 6 to 12 weeks depending on patient tolerance and progress    Tayo Jimenez, PT                 I CERTIFY THE NEED FOR THESE SERVICES FURNISHED UNDER        THIS PLAN OF TREATMENT AND WHILE UNDER MY CARE     (Physician co-signature of this document indicates review and certification of the therapy plan).                       Certification Date From:    7/7/21  Certification Date To:    9/7/21    Referring Provider:  Johnny    Initial Assessment        See Epic Evaluation Start of Care Date: 07/07/21

## 2021-07-07 NOTE — INITIAL ASSESSMENTS
07/07/21 1700   General Information   Type of Visit Initial OP Ortho PT Evaluation   Start of Care Date 07/07/21   Referring Physician Johnny   Patient/Family Goals Statement walk with no limp or pain   Orders Evaluate and Treat   Certification Required? Yes   Medical Diagnosis Lumbar radiculopathy M54.16 It band syndrome, right M76.31    Surgical/Medical history reviewed Yes   Body Part(s)   Body Part(s) Lumbar Spine/SI   Presentation and Etiology   Pertinent history of current problem (include personal factors and/or comorbidities that impact the POC) bilateral back/SI and hip/ITB t lower leg pain. with stifness, loss fo strength, and trouble with wt bring at times, 4-8/10 made worse wth sitting and some positiones. pain started early June 2021, possibly from gardening tasks. he does walk 1 mile 2x/day. pain is aching and burning, more often with sitting in car, but does notice it a somewhat all of the time. improved with walking, heat and certain positions, tylenol and Naproxen are of some help. pain also increased with sneeze and coughing. He is retired, and enjoys gardening walking, yoga, and volunteer at [employer].    Fall Risk Screen   Fall screen completed by PT   Is patient a fall risk? No   Abuse Screen (yes response referral indicated)   Feels Unsafe at Home or Work/School no   Feels Threatened by Someone no   Does Anyone Try to Keep You From Having Contact with Others or Doing Things Outside Your Home? no   Physical Signs of Abuse Present no   Lumbar Spine/SI Objective Findings   Observation limp when walking, decrased wt bering tolerance on left leg.    Balance/Proprioception (Single Leg Stance) good   Flexion ROM full   Extension ROM full   Right Side Bending ROM pain with side bend   Left Side Bending ROM pain with side end, SI pain   Repeated Extension-Standing ROM no clancy   Repeated Flexion-Standing ROM no change   Pelvic Screen ant R, Post L   Hip Screen neg   Hip Abduction Strength good   Hip  Adduction Strength good   Hip Extension Strength fair, diminished left glut contraction   Hamstring Flexibility moderate tight   Hip Flexor Flexibility mild tight   Quadricep Flexibility moderate tight   Piriformis Flexibility fair   SLR ham stretch   Prone Instability Test neg   Crossover SLR neg   Repeated Extension Prone n   Slump Test mild pain incrse at SI   Palpation increase muscle tone along right paraspinals, tender at mid ITB.    Planned Therapy Interventions   Planned Therapy Interventions joint mobilization;manual therapy;neuromuscular re-education;ROM;strengthening;stretching   Planned Modality Interventions   Planned Modality Interventions Cryotherapy;Hot packs;Electrical stimulation;Ultrasound;Traction   Planned Modality Interventions Comments Per PT judgement and pt need.    Clinical Impression   Criteria for Skilled Therapeutic Interventions Met yes, treatment indicated   PT Diagnosis SI joint dysfunction lumbar spine disc irritation/radiculopathy.  IT band pain   Functional limitations due to impairments Sitting tolerance walking house chores tolerance   Clinical Presentation Stable/Uncomplicated   Clinical Decision Making (Complexity) Moderate complexity   Predicted Duration of Therapy Intervention (days/wks) 1-2 times a week for 6 to 12 weeks depending on patient tolerance and progress   Risk & Benefits of therapy have been explained Yes   Patient, Family & other staff in agreement with plan of care Yes   Clinical Impression Comments Pelvic dysfunction SIJ dysfunction improved with muscle energy and manual techniques.  Patient needs to work on core strength glutes strength help stabilize lumbar spine and SI spine and reduce strain on discs causing neural pain and radicular pain into lower extremities.  IT band may be involved due to pelvic dysfunction and rotation.   ORTHO GOALS   PT Ortho Eval Goals 1;2;3   Ortho Goal 1   Goal Identifier Pain   Goal Description Patient report no more than 4 out  of 10 pain with sitting or walking.   Target Date 08/27/21   Ortho Goal 2   Goal Identifier Home exercise program   Goal Description Patient will maintain home exercise program at least 3 times a week to reduce pain and improve mobility   Target Date 08/27/21   Ortho Goal 3   Goal Identifier Sitting   Goal Description Patient reports that he can sit car ride for up to 2 hours while maintaining good posture and reduction of pain more than 4 out of 10   Target Date 09/10/21   Total Evaluation Time   PT Francesco, Moderate Complexity Minutes (11440) 35

## 2021-07-09 ENCOUNTER — HOSPITAL ENCOUNTER (OUTPATIENT)
Dept: PHYSICAL THERAPY | Facility: OTHER | Age: 76
Setting detail: THERAPIES SERIES
End: 2021-07-09
Attending: INTERNAL MEDICINE
Payer: MEDICARE

## 2021-07-09 PROCEDURE — 97110 THERAPEUTIC EXERCISES: CPT | Mod: GP | Performed by: PHYSICAL THERAPIST

## 2021-07-09 PROCEDURE — 97140 MANUAL THERAPY 1/> REGIONS: CPT | Mod: GP | Performed by: PHYSICAL THERAPIST

## 2021-07-13 ENCOUNTER — HOSPITAL ENCOUNTER (OUTPATIENT)
Dept: PHYSICAL THERAPY | Facility: OTHER | Age: 76
Setting detail: THERAPIES SERIES
End: 2021-07-13
Attending: INTERNAL MEDICINE
Payer: MEDICARE

## 2021-07-13 PROCEDURE — 97110 THERAPEUTIC EXERCISES: CPT | Mod: GP | Performed by: PHYSICAL THERAPIST

## 2021-07-13 PROCEDURE — 97140 MANUAL THERAPY 1/> REGIONS: CPT | Mod: GP | Performed by: PHYSICAL THERAPIST

## 2021-07-16 ENCOUNTER — HOSPITAL ENCOUNTER (OUTPATIENT)
Dept: PHYSICAL THERAPY | Facility: OTHER | Age: 76
Setting detail: THERAPIES SERIES
End: 2021-07-16
Attending: INTERNAL MEDICINE
Payer: MEDICARE

## 2021-07-16 PROCEDURE — 97140 MANUAL THERAPY 1/> REGIONS: CPT | Mod: GP | Performed by: PHYSICAL THERAPIST

## 2021-07-16 PROCEDURE — 97110 THERAPEUTIC EXERCISES: CPT | Mod: GP | Performed by: PHYSICAL THERAPIST

## 2021-07-20 ENCOUNTER — HOSPITAL ENCOUNTER (OUTPATIENT)
Dept: PHYSICAL THERAPY | Facility: OTHER | Age: 76
Setting detail: THERAPIES SERIES
End: 2021-07-20
Attending: INTERNAL MEDICINE
Payer: MEDICARE

## 2021-07-20 PROCEDURE — 97140 MANUAL THERAPY 1/> REGIONS: CPT | Mod: GP | Performed by: PHYSICAL THERAPIST

## 2021-07-20 PROCEDURE — 97110 THERAPEUTIC EXERCISES: CPT | Mod: GP | Performed by: PHYSICAL THERAPIST

## 2021-07-23 ENCOUNTER — TELEPHONE (OUTPATIENT)
Dept: PEDIATRICS | Facility: OTHER | Age: 76
End: 2021-07-23

## 2021-07-23 ENCOUNTER — HOSPITAL ENCOUNTER (OUTPATIENT)
Dept: PHYSICAL THERAPY | Facility: OTHER | Age: 76
Setting detail: THERAPIES SERIES
End: 2021-07-23
Attending: INTERNAL MEDICINE
Payer: MEDICARE

## 2021-07-23 DIAGNOSIS — M54.16 LUMBAR RADICULOPATHY: Primary | ICD-10-CM

## 2021-07-23 PROCEDURE — 97140 MANUAL THERAPY 1/> REGIONS: CPT | Mod: GP | Performed by: PHYSICAL THERAPIST

## 2021-07-23 PROCEDURE — 97110 THERAPEUTIC EXERCISES: CPT | Mod: GP | Performed by: PHYSICAL THERAPIST

## 2021-07-23 NOTE — TELEPHONE ENCOUNTER
ICD-10-CM    1. Lumbar radiculopathy  M54.16 MR Lumbar Spine w/o Contrast      No orders of the defined types were placed in this encounter.    Signed, Guero Turner MD, FAAP, FACP  Internal Medicine & Pediatrics

## 2021-07-23 NOTE — TELEPHONE ENCOUNTER
Dr. Turner,    I have been working with Mr. Yoon for a few weeks, and have tom having a hard time tracking down the source of pain.     Can we consider Xrays of his Lumbar/Sacrum soon?    Thanks,  Juno

## 2021-07-26 ENCOUNTER — TELEPHONE (OUTPATIENT)
Dept: PEDIATRICS | Facility: OTHER | Age: 76
End: 2021-07-26

## 2021-07-26 DIAGNOSIS — Z77.21 EXPOSURE TO BLOOD: Primary | ICD-10-CM

## 2021-07-26 DIAGNOSIS — Z11.59 ENCOUNTER FOR SCREENING FOR OTHER VIRAL DISEASES: ICD-10-CM

## 2021-07-26 NOTE — TELEPHONE ENCOUNTER
After verifying last name and  patient was notified to make a lab only appointment.  Christi Zheng LPN
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
## 2021-07-26 NOTE — TELEPHONE ENCOUNTER
Patient was to have lab work repeated 6 weeks after exposure to blood. Orders pended.    Alee Tenorio LPN on 7/26/2021 at 10:54 AM

## 2021-07-28 ENCOUNTER — LAB (OUTPATIENT)
Dept: LAB | Facility: OTHER | Age: 76
End: 2021-07-28
Attending: INTERNAL MEDICINE
Payer: MEDICARE

## 2021-07-28 ENCOUNTER — HOSPITAL ENCOUNTER (OUTPATIENT)
Dept: PHYSICAL THERAPY | Facility: OTHER | Age: 76
Setting detail: THERAPIES SERIES
End: 2021-07-28
Attending: INTERNAL MEDICINE
Payer: MEDICARE

## 2021-07-28 DIAGNOSIS — Z77.21 EXPOSURE TO BLOOD: ICD-10-CM

## 2021-07-28 DIAGNOSIS — Z11.59 ENCOUNTER FOR SCREENING FOR OTHER VIRAL DISEASES: ICD-10-CM

## 2021-07-28 LAB
ANION GAP SERPL CALCULATED.3IONS-SCNC: 11 MMOL/L (ref 3–14)
BUN SERPL-MCNC: 28 MG/DL (ref 7–25)
CALCIUM SERPL-MCNC: 10.1 MG/DL (ref 8.6–10.3)
CHLORIDE BLD-SCNC: 105 MMOL/L (ref 98–107)
CO2 SERPL-SCNC: 25 MMOL/L (ref 21–31)
CREAT SERPL-MCNC: 1.37 MG/DL (ref 0.7–1.3)
GFR SERPL CREATININE-BSD FRML MDRD: 49 ML/MIN/1.73M2
GLUCOSE BLD-MCNC: 99 MG/DL (ref 70–105)
POTASSIUM BLD-SCNC: 3.9 MMOL/L (ref 3.5–5.1)
SODIUM SERPL-SCNC: 141 MMOL/L (ref 134–144)

## 2021-07-28 PROCEDURE — 97110 THERAPEUTIC EXERCISES: CPT | Mod: GP | Performed by: PHYSICAL THERAPIST

## 2021-07-28 PROCEDURE — 86704 HEP B CORE ANTIBODY TOTAL: CPT | Mod: ZL

## 2021-07-28 PROCEDURE — 97140 MANUAL THERAPY 1/> REGIONS: CPT | Mod: GP | Performed by: PHYSICAL THERAPIST

## 2021-07-28 PROCEDURE — 86803 HEPATITIS C AB TEST: CPT | Mod: ZL

## 2021-07-28 PROCEDURE — 86706 HEP B SURFACE ANTIBODY: CPT | Mod: ZL

## 2021-07-28 PROCEDURE — 36415 COLL VENOUS BLD VENIPUNCTURE: CPT | Mod: ZL

## 2021-07-28 PROCEDURE — 80048 BASIC METABOLIC PNL TOTAL CA: CPT | Mod: ZL

## 2021-07-28 PROCEDURE — 87389 HIV-1 AG W/HIV-1&-2 AB AG IA: CPT | Mod: ZL

## 2021-07-29 ENCOUNTER — HOSPITAL ENCOUNTER (OUTPATIENT)
Dept: MRI IMAGING | Facility: OTHER | Age: 76
Discharge: HOME | End: 2021-07-29
Attending: INTERNAL MEDICINE | Admitting: INTERNAL MEDICINE
Payer: MEDICARE

## 2021-07-29 DIAGNOSIS — M54.16 LUMBAR RADICULOPATHY: ICD-10-CM

## 2021-07-29 PROCEDURE — 72148 MRI LUMBAR SPINE W/O DYE: CPT

## 2021-07-30 LAB
HBV CORE AB SERPL QL IA: NONREACTIVE
HBV SURFACE AB SERPL IA-ACNC: 1.14 M[IU]/ML
HCV AB SERPL QL IA: NONREACTIVE
HIV 1+2 AB+HIV1 P24 AG SERPL QL IA: NONREACTIVE

## 2021-08-13 ENCOUNTER — HOSPITAL ENCOUNTER (OUTPATIENT)
Dept: PHYSICAL THERAPY | Facility: OTHER | Age: 76
Setting detail: THERAPIES SERIES
End: 2021-08-13
Attending: INTERNAL MEDICINE
Payer: MEDICARE

## 2021-08-13 PROCEDURE — 97012 MECHANICAL TRACTION THERAPY: CPT | Mod: GP | Performed by: PHYSICAL THERAPIST

## 2021-08-13 PROCEDURE — 97140 MANUAL THERAPY 1/> REGIONS: CPT | Mod: GP | Performed by: PHYSICAL THERAPIST

## 2021-08-18 ENCOUNTER — HOSPITAL ENCOUNTER (OUTPATIENT)
Dept: PHYSICAL THERAPY | Facility: OTHER | Age: 76
Setting detail: THERAPIES SERIES
End: 2021-08-18
Attending: INTERNAL MEDICINE
Payer: MEDICARE

## 2021-08-18 PROCEDURE — 97140 MANUAL THERAPY 1/> REGIONS: CPT | Mod: GP | Performed by: PHYSICAL THERAPIST

## 2021-08-18 PROCEDURE — 97012 MECHANICAL TRACTION THERAPY: CPT | Mod: GP,XU | Performed by: PHYSICAL THERAPIST

## 2021-08-25 ENCOUNTER — HOSPITAL ENCOUNTER (OUTPATIENT)
Dept: PHYSICAL THERAPY | Facility: OTHER | Age: 76
Setting detail: THERAPIES SERIES
End: 2021-08-25
Attending: INTERNAL MEDICINE
Payer: MEDICARE

## 2021-08-25 PROCEDURE — 97110 THERAPEUTIC EXERCISES: CPT | Mod: GP | Performed by: PHYSICAL THERAPIST

## 2021-08-25 PROCEDURE — 97140 MANUAL THERAPY 1/> REGIONS: CPT | Mod: GP | Performed by: PHYSICAL THERAPIST

## 2021-08-27 ENCOUNTER — TELEPHONE (OUTPATIENT)
Dept: PEDIATRICS | Facility: OTHER | Age: 76
End: 2021-08-27

## 2021-08-27 DIAGNOSIS — M25.552 HIP PAIN, LEFT: Primary | ICD-10-CM

## 2021-08-27 NOTE — TELEPHONE ENCOUNTER
After patient's name and date of birth verified the below information was given.  Transferred patient to the appointment line to schedule an appointment.  Patricia Rosario LPN 8/27/2021   1:58 PM

## 2021-08-27 NOTE — TELEPHONE ENCOUNTER
Spoke to patient.   He states that his therapist recommended that he have an xray done of his left hip.  Patient states his left hip pain has gotten worse within the last week.  Patricia Rosario LPN 8/27/2021   11:58 AM

## 2021-08-27 NOTE — TELEPHONE ENCOUNTER
Wife called and pt is needing a Xray of his hip right away.  It was suggested by PT. Patient is in a lot of pain. Please call    Charlotte Vela on 8/27/2021 at 8:02 AM

## 2021-09-02 ENCOUNTER — OFFICE VISIT (OUTPATIENT)
Dept: FAMILY MEDICINE | Facility: OTHER | Age: 76
End: 2021-09-02
Attending: FAMILY MEDICINE
Payer: MEDICARE

## 2021-09-02 ENCOUNTER — HOSPITAL ENCOUNTER (OUTPATIENT)
Dept: GENERAL RADIOLOGY | Facility: OTHER | Age: 76
End: 2021-09-02
Attending: FAMILY MEDICINE
Payer: MEDICARE

## 2021-09-02 VITALS
BODY MASS INDEX: 23.22 KG/M2 | WEIGHT: 153.2 LBS | RESPIRATION RATE: 16 BRPM | OXYGEN SATURATION: 98 % | DIASTOLIC BLOOD PRESSURE: 80 MMHG | HEIGHT: 68 IN | HEART RATE: 76 BPM | TEMPERATURE: 97 F | SYSTOLIC BLOOD PRESSURE: 118 MMHG

## 2021-09-02 DIAGNOSIS — M25.552 HIP PAIN, LEFT: Primary | ICD-10-CM

## 2021-09-02 DIAGNOSIS — M25.552 HIP PAIN, LEFT: ICD-10-CM

## 2021-09-02 PROCEDURE — 73502 X-RAY EXAM HIP UNI 2-3 VIEWS: CPT

## 2021-09-02 PROCEDURE — 99213 OFFICE O/P EST LOW 20 MIN: CPT | Performed by: FAMILY MEDICINE

## 2021-09-02 PROCEDURE — G0463 HOSPITAL OUTPT CLINIC VISIT: HCPCS | Mod: 25

## 2021-09-02 PROCEDURE — G0463 HOSPITAL OUTPT CLINIC VISIT: HCPCS

## 2021-09-02 ASSESSMENT — PAIN SCALES - GENERAL: PAINLEVEL: SEVERE PAIN (6)

## 2021-09-02 ASSESSMENT — MIFFLIN-ST. JEOR: SCORE: 1394.41

## 2021-09-02 ASSESSMENT — ENCOUNTER SYMPTOMS
PARESTHESIAS: 1
DIFFICULTY URINATING: 0
WEAKNESS: 0

## 2021-09-02 NOTE — PROGRESS NOTES
"Assessment & Plan     Hip pain, left  XR with mild degenerative changes within his hip and no other significant abnormalities.  Encourage continued participation with physical therapy and follow-up if symptoms persistent or worsening.  - XR Pelvis and Hip Left 2 Views; Future    DO [facility]    Subjective   Jon is a 76 year old who presents for the following health issues accompanied by his spouse:    HPI     He presents today because his physical therapist has recommended he have his left hip be evaluated.  He has been participating in physical therapy for a couple of months, primarily for SI joint dysfunction and lumbar spine disc irritation/radiculopathy in addition to IT band pain.  He states that has continued to have pain from his left hip down to mid calf.  He has tingling over the lateral aspect of his left calf.  He has no loss of control over his bladder or bowels.  He describes the pain as constant burning pain with occasional shooting.  He has noticed nothing which brings the pain on.  Physical therapy typically helps a couple days after a session.  He has taken Tylenol intermittently with some very mild relief.    Review of Systems   Genitourinary: Negative for difficulty urinating.   Neurological: Positive for paresthesias. Negative for weakness.          Objective    /80   Pulse 76   Temp 97  F (36.1  C) (Temporal)   Resp 16   Ht 1.727 m (5' 8\")   Wt 69.5 kg (153 lb 3.2 oz)   SpO2 98%   BMI 23.29 kg/m    Body mass index is 23.29 kg/m .  Physical Exam  Constitutional:       General: He is not in acute distress.     Appearance: Normal appearance. He is not ill-appearing.   Pulmonary:      Effort: Pulmonary effort is normal.   Musculoskeletal:      Comments: Midline low lumbar tenderness to palpation.  Lumbar paraspinal musculature tender to palpation on left.  Left hip and IT band tender to palpation.   Neurological:      Mental Status: He is alert. "      Comments: Lower extremity strength 5/5 bilaterally.  Negative straight leg raise bilaterally.  Normal toe and heel walk.   Psychiatric:         Mood and Affect: Mood normal.     XR Pelvis and Hip Left 2 Views    Result Date: 9/2/2021  PROCEDURE: XR PELVIS AND HIP LEFT 2 VIEWS 9/2/2021 11:10 AM HISTORY: Hip pain, left COMPARISONS: None. TECHNIQUE: AP view of the pelvis and AP and lateral views of the left hip. FINDINGS: No acute fracture or dislocation is seen. There is mild degenerative change in both hips without significant subchondral marrow changes. There is mild degenerative change in lower lumbar facet joints.        IMPRESSION: Mild degenerative change. KATHY CONTRERAS MD   SYSTEM ID:  [id number]

## 2021-09-02 NOTE — NURSING NOTE
"Chief Complaint   Patient presents with     Hip Pain     Left     Going to PT for hip and spine.    Initial /80   Pulse 76   Temp 97  F (36.1  C) (Temporal)   Resp 16   Ht 1.727 m (5' 8\")   Wt 69.5 kg (153 lb 3.2 oz)   SpO2 98%   BMI 23.29 kg/m   Estimated body mass index is 23.29 kg/m  as calculated from the following:    Height as of this encounter: 1.727 m (5' 8\").    Weight as of this encounter: 69.5 kg (153 lb 3.2 oz).     FOOD SECURITY SCREENING QUESTIONS  Hunger Vital Signs:  Within the past 12 months we worried whether our food would run out before we got money to buy more. Never  Within the past 12 months the food we bought just didn't last and we didn't have money to get more. Never      Medication Reconciliation: Complete      Norma J. Gosselin, LPN   "

## 2021-09-16 ENCOUNTER — HOSPITAL ENCOUNTER (OUTPATIENT)
Dept: PHYSICAL THERAPY | Facility: OTHER | Age: 76
Setting detail: THERAPIES SERIES
End: 2021-09-16
Attending: INTERNAL MEDICINE
Payer: MEDICARE

## 2021-09-16 PROCEDURE — 97110 THERAPEUTIC EXERCISES: CPT | Mod: GP | Performed by: PHYSICAL THERAPIST

## 2021-09-16 PROCEDURE — 97012 MECHANICAL TRACTION THERAPY: CPT | Mod: GP | Performed by: PHYSICAL THERAPIST

## 2021-09-16 PROCEDURE — 97140 MANUAL THERAPY 1/> REGIONS: CPT | Mod: GP,XU | Performed by: PHYSICAL THERAPIST

## 2021-09-23 ENCOUNTER — OFFICE VISIT (OUTPATIENT)
Dept: PEDIATRICS | Facility: OTHER | Age: 76
End: 2021-09-23
Attending: INTERNAL MEDICINE
Payer: COMMERCIAL

## 2021-09-23 VITALS
OXYGEN SATURATION: 98 % | TEMPERATURE: 96.9 F | BODY MASS INDEX: 23.65 KG/M2 | HEART RATE: 83 BPM | RESPIRATION RATE: 16 BRPM | HEIGHT: 69 IN | WEIGHT: 159.7 LBS | SYSTOLIC BLOOD PRESSURE: 126 MMHG | DIASTOLIC BLOOD PRESSURE: 86 MMHG

## 2021-09-23 DIAGNOSIS — Z23 NEED FOR VACCINATION: ICD-10-CM

## 2021-09-23 DIAGNOSIS — D49.0 IPMN (INTRADUCTAL PAPILLARY MUCINOUS NEOPLASM): Chronic | ICD-10-CM

## 2021-09-23 DIAGNOSIS — Z00.00 ENCOUNTER FOR MEDICARE ANNUAL WELLNESS EXAM: Primary | ICD-10-CM

## 2021-09-23 DIAGNOSIS — I10 ESSENTIAL HYPERTENSION: ICD-10-CM

## 2021-09-23 DIAGNOSIS — N18.31 STAGE 3A CHRONIC KIDNEY DISEASE (H): ICD-10-CM

## 2021-09-23 DIAGNOSIS — I25.10 ASCVD (ARTERIOSCLEROTIC CARDIOVASCULAR DISEASE): ICD-10-CM

## 2021-09-23 DIAGNOSIS — Z23 NEED FOR PROPHYLACTIC VACCINATION AND INOCULATION AGAINST INFLUENZA: ICD-10-CM

## 2021-09-23 DIAGNOSIS — E03.9 ACQUIRED HYPOTHYROIDISM: Chronic | ICD-10-CM

## 2021-09-23 DIAGNOSIS — E78.2 MIXED HYPERLIPIDEMIA: ICD-10-CM

## 2021-09-23 DIAGNOSIS — I20.89 ATYPICAL ANGINA (H): ICD-10-CM

## 2021-09-23 PROBLEM — R94.5 LIVER FUNCTION ABNORMALITY: Status: RESOLVED | Noted: 2019-04-16 | Resolved: 2021-09-23

## 2021-09-23 PROBLEM — N18.30 CHRONIC KIDNEY DISEASE, STAGE 3 (H): Status: ACTIVE | Noted: 2021-09-23

## 2021-09-23 PROBLEM — R63.4 WEIGHT LOSS: Status: RESOLVED | Noted: 2019-04-16 | Resolved: 2021-09-23

## 2021-09-23 PROBLEM — N18.30 CHRONIC KIDNEY DISEASE, STAGE 3 (H): Chronic | Status: ACTIVE | Noted: 2021-09-23

## 2021-09-23 LAB
ANION GAP SERPL CALCULATED.3IONS-SCNC: 8 MMOL/L (ref 3–14)
BUN SERPL-MCNC: 23 MG/DL (ref 7–25)
CALCIUM SERPL-MCNC: 10.2 MG/DL (ref 8.6–10.3)
CHLORIDE BLD-SCNC: 105 MMOL/L (ref 98–107)
CHOLEST SERPL-MCNC: 133 MG/DL
CO2 SERPL-SCNC: 27 MMOL/L (ref 21–31)
CREAT SERPL-MCNC: 1.45 MG/DL (ref 0.7–1.3)
ERYTHROCYTE [DISTWIDTH] IN BLOOD BY AUTOMATED COUNT: 12.7 % (ref 10–15)
FASTING STATUS PATIENT QL REPORTED: NORMAL
GFR SERPL CREATININE-BSD FRML MDRD: 46 ML/MIN/1.73M2
GLUCOSE BLD-MCNC: 103 MG/DL (ref 70–105)
HCT VFR BLD AUTO: 47.9 % (ref 40–53)
HDLC SERPL-MCNC: 54 MG/DL (ref 23–92)
HGB BLD-MCNC: 16 G/DL (ref 13.3–17.7)
LDLC SERPL CALC-MCNC: 59 MG/DL
MCH RBC QN AUTO: 33.3 PG (ref 26.5–33)
MCHC RBC AUTO-ENTMCNC: 33.4 G/DL (ref 31.5–36.5)
MCV RBC AUTO: 100 FL (ref 78–100)
NONHDLC SERPL-MCNC: 79 MG/DL
PLATELET # BLD AUTO: 258 10E3/UL (ref 150–450)
POTASSIUM BLD-SCNC: 4.1 MMOL/L (ref 3.5–5.1)
RBC # BLD AUTO: 4.81 10E6/UL (ref 4.4–5.9)
SODIUM SERPL-SCNC: 140 MMOL/L (ref 134–144)
TRIGL SERPL-MCNC: 102 MG/DL
TSH SERPL DL<=0.005 MIU/L-ACNC: 1.31 MU/L (ref 0.4–4)
WBC # BLD AUTO: 7.7 10E3/UL (ref 4–11)

## 2021-09-23 PROCEDURE — 80061 LIPID PANEL: CPT | Mod: ZL | Performed by: INTERNAL MEDICINE

## 2021-09-23 PROCEDURE — 84443 ASSAY THYROID STIM HORMONE: CPT | Mod: ZL | Performed by: INTERNAL MEDICINE

## 2021-09-23 PROCEDURE — 85027 COMPLETE CBC AUTOMATED: CPT | Mod: ZL | Performed by: INTERNAL MEDICINE

## 2021-09-23 PROCEDURE — G0438 PPPS, INITIAL VISIT: HCPCS | Performed by: INTERNAL MEDICINE

## 2021-09-23 PROCEDURE — 90662 IIV NO PRSV INCREASED AG IM: CPT

## 2021-09-23 PROCEDURE — G0008 ADMIN INFLUENZA VIRUS VAC: HCPCS

## 2021-09-23 PROCEDURE — 36415 COLL VENOUS BLD VENIPUNCTURE: CPT | Mod: ZL | Performed by: INTERNAL MEDICINE

## 2021-09-23 PROCEDURE — 80048 BASIC METABOLIC PNL TOTAL CA: CPT | Mod: ZL | Performed by: INTERNAL MEDICINE

## 2021-09-23 RX ORDER — NITROGLYCERIN 0.4 MG/1
TABLET SUBLINGUAL
Qty: 25 TABLET | Refills: 3 | Status: SHIPPED | OUTPATIENT
Start: 2021-09-23 | End: 2023-05-25

## 2021-09-23 RX ORDER — LEVOTHYROXINE SODIUM 88 UG/1
88 TABLET ORAL DAILY
Qty: 90 TABLET | Refills: 3 | Status: SHIPPED | OUTPATIENT
Start: 2021-09-23 | End: 2022-05-05

## 2021-09-23 RX ORDER — METOPROLOL SUCCINATE 50 MG/1
50 TABLET, EXTENDED RELEASE ORAL DAILY
Qty: 90 TABLET | Refills: 3 | Status: SHIPPED | OUTPATIENT
Start: 2021-09-23 | End: 2022-05-05

## 2021-09-23 RX ORDER — ATORVASTATIN CALCIUM 10 MG/1
10 TABLET, FILM COATED ORAL DAILY
Qty: 90 TABLET | Refills: 3 | Status: SHIPPED | OUTPATIENT
Start: 2021-09-23 | End: 2022-05-05

## 2021-09-23 RX ORDER — RAMIPRIL 2.5 MG/1
2.5 CAPSULE ORAL DAILY
Qty: 90 CAPSULE | Refills: 3 | Status: SHIPPED | OUTPATIENT
Start: 2021-09-23 | End: 2022-05-05

## 2021-09-23 ASSESSMENT — MIFFLIN-ST. JEOR: SCORE: 1438.15

## 2021-09-23 ASSESSMENT — PAIN SCALES - GENERAL: PAINLEVEL: SEVERE PAIN (6)

## 2021-09-23 NOTE — PROGRESS NOTES
SUBJECTIVE:   Sandro Yoon is a 76 year old male who presents for Preventive Visit.      Patient has been advised of split billing requirements and indicates understanding: yes  Are you in the first 12 months of your Medicare coverage?  No    HPI  Do you feel safe in your environment? Yes    Have you ever done Advance Care Planning? (For example, a Health Directive, POLST, or a discussion with a medical provider or your loved ones about your wishes): Yes, advance care planning is on file.       Fall risk  Fallen 2 or more times in the past year?: No  Any fall with injury in the past year?: No    Cognitive Screening   1) Repeat 3 items (Leader, Season, Table)    2) Clock draw: NORMAL  3) 3 item recall: Recalls 2 objects   Results: NORMAL clock, 1-2 items recalled: COGNITIVE IMPAIRMENT LESS LIKELY    Mini-CogTM Copyright S Nawaf. Licensed by the author for use in [facility]; reprinted with permission (bert@[facility]). All rights reserved.      Do you have sleep apnea, excessive snoring or daytime drowsiness?: no    Reviewed and updated as needed this visit by clinical staff  Tobacco  Allergies  Meds  Problems  Med Hx  Surg Hx  Fam Hx          Reviewed and updated as needed this visit by Provider  Tobacco  Allergies  Meds  Problems  Med Hx  Surg Hx  Fam Hx         Social History     Tobacco Use     Smoking status: Never Smoker     Smokeless tobacco: Never Used   Substance Use Topics     Alcohol use: Not Currently     Comment: zero use since 6/2018     If you drink alcohol do you typically have >3 drinks per day or >7 drinks per week? No    Alcohol Use 9/23/2021   Prescreen: >3 drinks/day or >7 drinks/week? No               Current providers sharing in care for this patient include:   Patient Care Team:  Guero Turner MD as PCP - General (Pediatrics)  Guero Turner MD as Assigned PCP    The following health maintenance items are reviewed in Epic and correct as of  "today:  There are no preventive care reminders to display for this patient.  Labs reviewed in EPIC  Immunizations are reviewed and up-to-date          Review of Systems  Constitutional, HEENT, cardiovascular, pulmonary, gi and gu systems are negative, except as otherwise noted.    OBJECTIVE:   /86 (BP Location: Right arm, Patient Position: Sitting, Cuff Size: Adult Regular)   Pulse 83   Temp 96.9  F (36.1  C) (Tympanic)   Resp 16   Ht 1.75 m (5' 8.9\")   Wt 72.4 kg (159 lb 11.2 oz)   SpO2 98%   BMI 23.65 kg/m   Estimated body mass index is 23.65 kg/m  as calculated from the following:    Height as of this encounter: 1.75 m (5' 8.9\").    Weight as of this encounter: 72.4 kg (159 lb 11.2 oz).  Physical Exam  HEENT: No carotid bruits  Cardiovascular: Regular rate and rhythm, no murmur  Pulmonary: Clear  MSK no edema    Diagnostic Test Results:  Labs reviewed in Epic    ASSESSMENT / PLAN:       ICD-10-CM    1. Encounter for Medicare annual wellness exam  Z00.00    2. Stage 3a chronic kidney disease  N18.31 CBC with platelets     CBC with platelets   3. ASCVD (arteriosclerotic cardiovascular disease)  I25.10 atorvastatin (LIPITOR) 10 MG tablet   4. Acquired hypothyroidism  E03.9 levothyroxine (SYNTHROID/LEVOTHROID) 88 MCG tablet     TSH     TSH   5. Essential hypertension  I10 metoprolol succinate ER (TOPROL-XL) 50 MG 24 hr tablet     ramipril (ALTACE) 2.5 MG capsule     Basic metabolic panel     Basic metabolic panel   6. Atypical angina (H)  I20.8 nitroGLYcerin (NITROSTAT) 0.4 MG sublingual tablet   7. Need for prophylactic vaccination and inoculation against influenza  Z23    8. Mixed hyperlipidemia  E78.2 Lipid Profile     Lipid Profile   9. Need for vaccination  Z23 INFLUENZA, QUAD, HIGH DOSE, PF, 65YR + (FLUZONE HD)   10. IPMN (intraductal papillary mucinous neoplasm)  D49.0        Patient has been advised of split billing requirements and indicates understanding: Yes  COUNSELING:  Reviewed preventive " "health counseling, as reflected in patient instructions    Estimated body mass index is 23.65 kg/m  as calculated from the following:    Height as of this encounter: 1.75 m (5' 8.9\").    Weight as of this encounter: 72.4 kg (159 lb 11.2 oz).        He reports that he has never smoked. He has never used smokeless tobacco.      Appropriate preventive services were discussed with this patient, including applicable screening as appropriate for cardiovascular disease, diabetes, osteopenia/osteoporosis, and glaucoma.  As appropriate for age/gender, discussed screening for colorectal cancer, prostate cancer, breast cancer, and cervical cancer. Checklist reviewing preventive services available has been given to the patient.    Reviewed patients plan of care and provided an AVS. The Basic Care Plan (routine screening as documented in Health Maintenance) for Sandro meets the Care Plan requirement. This Care Plan has been established and reviewed with the Patient.    Counseling Resources:  ATP IV Guidelines  Pooled Cohorts Equation Calculator  Breast Cancer Risk Calculator  Breast Cancer: Medication to Reduce Risk  FRAX Risk Assessment  ICSI Preventive Guidelines  Dietary Guidelines for Americans, 2010  USDA's MyPlate  ASA Prophylaxis  Lung CA Screening    Guero Turner MD  [facility]    Identified Health Risks:  "

## 2021-09-23 NOTE — NURSING NOTE
Immunization Documentation    Prior to Immunization administration, verified patients identity using patient's name and date of birth. Please see IMMUNIZATIONS  and order for additional information.  Patient / Parent instructed to remain in clinic for 15 minutes and report any adverse reaction to staff immediately.    Was the entire amount of vaccines given used? Yes    Ivonne Dominguez LPN  9/23/2021   8:41 AM

## 2021-09-23 NOTE — PATIENT INSTRUCTIONS
Patient Education   Personalized Prevention Plan  You are due for the preventive services outlined below.  Your care team is available to assist you in scheduling these services.  If you have already completed any of these items, please share that information with your care team to update in your medical record.  Health Maintenance Due   Topic Date Due     Flu Vaccine (1) 09/01/2021

## 2021-09-23 NOTE — NURSING NOTE
"Chief Complaint   Patient presents with     Medicare Visit     wellness     Imm/Inj     Flu Shot       FOOD SECURITY SCREENING QUESTIONS  Hunger Vital Signs:  Within the past 12 months we worried whether our food would run out before we got money to buy more. Never  Within the past 12 months the food we bought just didn't last and we didn't have money to get more. Never  Ivonne Dominguez LPN 9/23/2021 8:07 AM      Initial /86 (BP Location: Right arm, Patient Position: Sitting, Cuff Size: Adult Regular)   Pulse 83   Temp 96.9  F (36.1  C) (Tympanic)   Resp 16   Ht 1.75 m (5' 8.9\")   Wt 72.4 kg (159 lb 11.2 oz)   SpO2 98%   BMI 23.65 kg/m   Estimated body mass index is 23.65 kg/m  as calculated from the following:    Height as of this encounter: 1.75 m (5' 8.9\").    Weight as of this encounter: 72.4 kg (159 lb 11.2 oz).  Medication Reconciliation: complete    Ivonne Dominguez LPN  "

## 2021-09-29 ENCOUNTER — HOSPITAL ENCOUNTER (OUTPATIENT)
Dept: PHYSICAL THERAPY | Facility: OTHER | Age: 76
Setting detail: THERAPIES SERIES
End: 2021-09-29
Attending: INTERNAL MEDICINE
Payer: MEDICARE

## 2021-09-29 PROCEDURE — 97110 THERAPEUTIC EXERCISES: CPT | Mod: GP | Performed by: PHYSICAL THERAPIST

## 2021-09-29 PROCEDURE — 97140 MANUAL THERAPY 1/> REGIONS: CPT | Mod: GP | Performed by: PHYSICAL THERAPIST

## 2021-09-29 PROCEDURE — 97012 MECHANICAL TRACTION THERAPY: CPT | Mod: GP,XU | Performed by: PHYSICAL THERAPIST

## 2021-10-01 ENCOUNTER — HOSPITAL ENCOUNTER (OUTPATIENT)
Dept: PHYSICAL THERAPY | Facility: OTHER | Age: 76
Setting detail: THERAPIES SERIES
End: 2021-10-01
Attending: INTERNAL MEDICINE
Payer: MEDICARE

## 2021-10-01 PROCEDURE — 97140 MANUAL THERAPY 1/> REGIONS: CPT | Mod: GP | Performed by: PHYSICAL THERAPIST

## 2021-10-01 PROCEDURE — 97110 THERAPEUTIC EXERCISES: CPT | Mod: GP | Performed by: PHYSICAL THERAPIST

## 2021-10-01 PROCEDURE — 97012 MECHANICAL TRACTION THERAPY: CPT | Mod: GP | Performed by: PHYSICAL THERAPIST

## 2021-10-06 ENCOUNTER — HOSPITAL ENCOUNTER (OUTPATIENT)
Dept: PHYSICAL THERAPY | Facility: OTHER | Age: 76
Setting detail: THERAPIES SERIES
End: 2021-10-06
Attending: INTERNAL MEDICINE
Payer: MEDICARE

## 2021-10-06 PROCEDURE — 97110 THERAPEUTIC EXERCISES: CPT | Mod: GP | Performed by: PHYSICAL THERAPIST

## 2021-10-06 PROCEDURE — 97012 MECHANICAL TRACTION THERAPY: CPT | Mod: GP | Performed by: PHYSICAL THERAPIST

## 2021-10-06 PROCEDURE — 97140 MANUAL THERAPY 1/> REGIONS: CPT | Mod: GP | Performed by: PHYSICAL THERAPIST

## 2021-10-14 ENCOUNTER — HOSPITAL ENCOUNTER (OUTPATIENT)
Dept: PHYSICAL THERAPY | Facility: OTHER | Age: 76
Setting detail: THERAPIES SERIES
End: 2021-10-14
Attending: INTERNAL MEDICINE
Payer: MEDICARE

## 2021-10-14 PROCEDURE — 97012 MECHANICAL TRACTION THERAPY: CPT | Mod: GP | Performed by: PHYSICAL THERAPIST

## 2021-10-14 PROCEDURE — 97110 THERAPEUTIC EXERCISES: CPT | Mod: GP | Performed by: PHYSICAL THERAPIST

## 2021-10-14 PROCEDURE — 97140 MANUAL THERAPY 1/> REGIONS: CPT | Mod: GP,XU | Performed by: PHYSICAL THERAPIST

## 2021-10-21 ENCOUNTER — TELEPHONE (OUTPATIENT)
Dept: PEDIATRICS | Facility: OTHER | Age: 76
End: 2021-10-21

## 2021-10-21 ENCOUNTER — IMMUNIZATION (OUTPATIENT)
Dept: FAMILY MEDICINE | Facility: OTHER | Age: 76
End: 2021-10-21
Attending: FAMILY MEDICINE
Payer: MEDICARE

## 2021-10-21 DIAGNOSIS — Z91.041 CONTRAST MEDIA ALLERGY: Primary | ICD-10-CM

## 2021-10-21 PROCEDURE — 91300 PR COVID VAC PFIZER DIL RECON 30 MCG/0.3 ML IM: CPT

## 2021-10-21 RX ORDER — DIPHENHYDRAMINE HCL 50 MG
50 CAPSULE ORAL EVERY 6 HOURS PRN
Qty: 10 CAPSULE | Refills: 0 | Status: SHIPPED | OUTPATIENT
Start: 2021-10-21 | End: 2022-05-05

## 2021-10-21 RX ORDER — METHYLPREDNISOLONE 32 MG/1
32 TABLET ORAL DAILY
Qty: 1 TABLET | Refills: 0 | Status: SHIPPED | OUTPATIENT
Start: 2021-10-21 | End: 2023-05-25

## 2021-10-21 NOTE — TELEPHONE ENCOUNTER
Informed patient of the providers instructions below. Patient has a question regarding his prescription Brilinta. He wants to know why he needs to stop taking Brilinta 5 days prior to his injection since his cardiologist said one day is okay? Please advise.  Christi Zheng LPN

## 2021-10-21 NOTE — TELEPHONE ENCOUNTER
Patient was in getting COVID vax.  Was told by Dr Uribe to hold Birlinta for 5-7 days.  Pt would like to discuss with Dr Turner

## 2021-10-21 NOTE — TELEPHONE ENCOUNTER
Received call from [facility].      ICD-10-CM    1. Contrast media allergy  Z91.041 methylPREDNISolone (MEDROL) 32 MG tablet     diphenhydrAMINE (BENADRYL) 50 MG capsule      Orders Placed This Encounter   Medications     methylPREDNISolone (MEDROL) 32 MG tablet     Sig: Take 1 tablet (32 mg) by mouth daily 12 hours prior to the procedure with IV contrast     Dispense:  1 tablet     Refill:  0     diphenhydrAMINE (BENADRYL) 50 MG capsule     Sig: Take 1 capsule (50 mg) by mouth every 6 hours as needed for itching or allergies Administer 30 min - 2 hours pre - IV contrast injection     Dispense:  10 capsule     Refill:  0     Signed, Guero Turner MD, FAAP, FACP  Internal Medicine & Pediatrics

## 2021-10-22 NOTE — TELEPHONE ENCOUNTER
It's been more than 6 months since his last stent, so it'll be fine to hold the Brillinta for 5 days pre procedure.    Signed, Guero Turner MD, FAAP, FACP  Internal Medicine & Pediatrics

## 2021-10-22 NOTE — TELEPHONE ENCOUNTER
Halflife is 7-9 hours.  So minimum would be around 3 days.  5 days is fine.  If he has questions he should call his Cardiologist.    Signed, Guero Turner MD, FAAP, FACP  Internal Medicine & Pediatrics

## 2021-10-27 ENCOUNTER — HOSPITAL ENCOUNTER (OUTPATIENT)
Dept: PHYSICAL THERAPY | Facility: OTHER | Age: 76
Setting detail: THERAPIES SERIES
End: 2021-10-27
Attending: INTERNAL MEDICINE
Payer: MEDICARE

## 2021-10-27 DIAGNOSIS — M54.16 LUMBAR RADICULOPATHY: ICD-10-CM

## 2021-10-27 DIAGNOSIS — M48.061 LUMBAR FORAMINAL STENOSIS: ICD-10-CM

## 2021-10-27 PROCEDURE — 97140 MANUAL THERAPY 1/> REGIONS: CPT | Mod: GP,XU | Performed by: PHYSICAL THERAPIST

## 2021-10-27 PROCEDURE — 97110 THERAPEUTIC EXERCISES: CPT | Mod: GP | Performed by: PHYSICAL THERAPIST

## 2021-10-27 PROCEDURE — 97012 MECHANICAL TRACTION THERAPY: CPT | Mod: GP | Performed by: PHYSICAL THERAPIST

## 2021-11-03 ENCOUNTER — HOSPITAL ENCOUNTER (OUTPATIENT)
Dept: PHYSICAL THERAPY | Facility: OTHER | Age: 76
Setting detail: THERAPIES SERIES
End: 2021-11-03
Attending: INTERNAL MEDICINE
Payer: MEDICARE

## 2021-11-03 PROCEDURE — 97110 THERAPEUTIC EXERCISES: CPT | Mod: GP | Performed by: PHYSICAL THERAPIST

## 2021-11-03 PROCEDURE — 97140 MANUAL THERAPY 1/> REGIONS: CPT | Mod: GP | Performed by: PHYSICAL THERAPIST

## 2021-11-09 ENCOUNTER — HOSPITAL ENCOUNTER (OUTPATIENT)
Dept: PHYSICAL THERAPY | Facility: OTHER | Age: 76
Setting detail: THERAPIES SERIES
End: 2021-11-09
Attending: INTERNAL MEDICINE
Payer: MEDICARE

## 2021-11-09 PROCEDURE — 97110 THERAPEUTIC EXERCISES: CPT | Mod: GP | Performed by: PHYSICAL THERAPIST

## 2021-11-09 PROCEDURE — 97140 MANUAL THERAPY 1/> REGIONS: CPT | Mod: GP | Performed by: PHYSICAL THERAPIST

## 2021-11-11 ENCOUNTER — HOSPITAL ENCOUNTER (OUTPATIENT)
Dept: GENERAL RADIOLOGY | Facility: OTHER | Age: 76
Discharge: HOME | End: 2021-11-11
Attending: INTERNAL MEDICINE | Admitting: INTERNAL MEDICINE
Payer: MEDICARE

## 2021-11-11 DIAGNOSIS — M48.061 LUMBAR FORAMINAL STENOSIS: ICD-10-CM

## 2021-11-11 DIAGNOSIS — M54.16 LUMBAR RADICULOPATHY: ICD-10-CM

## 2021-11-11 PROCEDURE — 255N000002 HC RX 255 OP 636: Performed by: RADIOLOGY

## 2021-11-11 PROCEDURE — 250N000011 HC RX IP 250 OP 636: Performed by: RADIOLOGY

## 2021-11-11 PROCEDURE — 250N000009 HC RX 250: Performed by: RADIOLOGY

## 2021-11-11 PROCEDURE — 62323 NJX INTERLAMINAR LMBR/SAC: CPT

## 2021-11-11 RX ORDER — DEXAMETHASONE SODIUM PHOSPHATE 10 MG/ML
10 INJECTION, SOLUTION INTRAMUSCULAR; INTRAVENOUS ONCE
Status: COMPLETED | OUTPATIENT
Start: 2021-11-11 | End: 2021-11-11

## 2021-11-11 RX ORDER — LIDOCAINE HYDROCHLORIDE 10 MG/ML
2 INJECTION, SOLUTION EPIDURAL; INFILTRATION; INTRACAUDAL; PERINEURAL ONCE
Status: COMPLETED | OUTPATIENT
Start: 2021-11-11 | End: 2021-11-11

## 2021-11-11 RX ORDER — LIDOCAINE HYDROCHLORIDE 10 MG/ML
2 INJECTION, SOLUTION INFILTRATION; PERINEURAL ONCE
Status: COMPLETED | OUTPATIENT
Start: 2021-11-11 | End: 2021-11-11

## 2021-11-11 RX ADMIN — DEXAMETHASONE SODIUM PHOSPHATE 10 MG: 10 INJECTION INTRAMUSCULAR; INTRAVENOUS at 10:09

## 2021-11-11 RX ADMIN — LIDOCAINE HYDROCHLORIDE 2 ML: 10 INJECTION, SOLUTION EPIDURAL; INFILTRATION; INTRACAUDAL; PERINEURAL at 10:09

## 2021-11-11 RX ADMIN — IOHEXOL 2 ML: 240 INJECTION, SOLUTION INTRATHECAL; INTRAVASCULAR; INTRAVENOUS; ORAL at 10:08

## 2021-11-11 RX ADMIN — LIDOCAINE HYDROCHLORIDE 2 ML: 10 INJECTION, SOLUTION INFILTRATION; PERINEURAL at 10:09

## 2021-11-16 ENCOUNTER — HOSPITAL ENCOUNTER (OUTPATIENT)
Dept: PHYSICAL THERAPY | Facility: OTHER | Age: 76
Setting detail: THERAPIES SERIES
End: 2021-11-16
Attending: INTERNAL MEDICINE
Payer: MEDICARE

## 2021-11-16 PROCEDURE — 97140 MANUAL THERAPY 1/> REGIONS: CPT | Mod: GP | Performed by: PHYSICAL THERAPIST

## 2021-11-16 PROCEDURE — 97110 THERAPEUTIC EXERCISES: CPT | Mod: GP | Performed by: PHYSICAL THERAPIST

## 2021-11-23 ENCOUNTER — HOSPITAL ENCOUNTER (OUTPATIENT)
Dept: PHYSICAL THERAPY | Facility: OTHER | Age: 76
Setting detail: THERAPIES SERIES
End: 2021-11-23
Attending: INTERNAL MEDICINE
Payer: MEDICARE

## 2021-11-23 PROCEDURE — 97012 MECHANICAL TRACTION THERAPY: CPT | Mod: GP | Performed by: PHYSICAL THERAPIST

## 2021-11-23 PROCEDURE — 97110 THERAPEUTIC EXERCISES: CPT | Mod: GP | Performed by: PHYSICAL THERAPIST

## 2021-11-23 PROCEDURE — 97140 MANUAL THERAPY 1/> REGIONS: CPT | Mod: GP | Performed by: PHYSICAL THERAPIST

## 2021-11-30 ENCOUNTER — HOSPITAL ENCOUNTER (OUTPATIENT)
Dept: PHYSICAL THERAPY | Facility: OTHER | Age: 76
Setting detail: THERAPIES SERIES
End: 2021-11-30
Attending: INTERNAL MEDICINE
Payer: MEDICARE

## 2021-11-30 PROCEDURE — 97012 MECHANICAL TRACTION THERAPY: CPT | Mod: GP,XU | Performed by: PHYSICAL THERAPIST

## 2021-11-30 PROCEDURE — 97140 MANUAL THERAPY 1/> REGIONS: CPT | Mod: GP | Performed by: PHYSICAL THERAPIST

## 2021-11-30 PROCEDURE — 97110 THERAPEUTIC EXERCISES: CPT | Mod: GP | Performed by: PHYSICAL THERAPIST

## 2021-12-10 ENCOUNTER — HOSPITAL ENCOUNTER (OUTPATIENT)
Dept: PHYSICAL THERAPY | Facility: OTHER | Age: 76
Setting detail: THERAPIES SERIES
End: 2021-12-10
Attending: INTERNAL MEDICINE
Payer: MEDICARE

## 2021-12-10 PROCEDURE — 97110 THERAPEUTIC EXERCISES: CPT | Mod: GP | Performed by: PHYSICAL THERAPIST

## 2021-12-10 PROCEDURE — 97012 MECHANICAL TRACTION THERAPY: CPT | Mod: GP | Performed by: PHYSICAL THERAPIST

## 2021-12-10 PROCEDURE — 97140 MANUAL THERAPY 1/> REGIONS: CPT | Mod: GP | Performed by: PHYSICAL THERAPIST

## 2021-12-14 ENCOUNTER — HOSPITAL ENCOUNTER (OUTPATIENT)
Dept: PHYSICAL THERAPY | Facility: OTHER | Age: 76
Setting detail: THERAPIES SERIES
End: 2021-12-14
Attending: INTERNAL MEDICINE
Payer: MEDICARE

## 2021-12-14 PROCEDURE — 97110 THERAPEUTIC EXERCISES: CPT | Mod: GP | Performed by: PHYSICAL THERAPIST

## 2021-12-14 PROCEDURE — 97012 MECHANICAL TRACTION THERAPY: CPT | Mod: GP | Performed by: PHYSICAL THERAPIST

## 2022-01-05 ENCOUNTER — HOSPITAL ENCOUNTER (OUTPATIENT)
Dept: PHYSICAL THERAPY | Facility: OTHER | Age: 77
Setting detail: THERAPIES SERIES
End: 2022-01-05
Attending: INTERNAL MEDICINE
Payer: MEDICARE

## 2022-01-05 PROCEDURE — 97012 MECHANICAL TRACTION THERAPY: CPT | Mod: GP | Performed by: PHYSICAL THERAPIST

## 2022-01-05 PROCEDURE — 97110 THERAPEUTIC EXERCISES: CPT | Mod: GP | Performed by: PHYSICAL THERAPIST

## 2022-01-13 ENCOUNTER — HOSPITAL ENCOUNTER (OUTPATIENT)
Dept: PHYSICAL THERAPY | Facility: OTHER | Age: 77
Setting detail: THERAPIES SERIES
End: 2022-01-13
Attending: INTERNAL MEDICINE
Payer: MEDICARE

## 2022-01-13 PROCEDURE — 97140 MANUAL THERAPY 1/> REGIONS: CPT | Mod: GP | Performed by: PHYSICAL THERAPIST

## 2022-01-13 PROCEDURE — 97110 THERAPEUTIC EXERCISES: CPT | Mod: GP | Performed by: PHYSICAL THERAPIST

## 2022-01-21 ENCOUNTER — HOSPITAL ENCOUNTER (OUTPATIENT)
Dept: PHYSICAL THERAPY | Facility: OTHER | Age: 77
Setting detail: THERAPIES SERIES
End: 2022-01-21
Attending: INTERNAL MEDICINE
Payer: MEDICARE

## 2022-01-21 PROCEDURE — 97110 THERAPEUTIC EXERCISES: CPT | Mod: GP | Performed by: PHYSICAL THERAPIST

## 2022-01-21 PROCEDURE — 97140 MANUAL THERAPY 1/> REGIONS: CPT | Mod: GP | Performed by: PHYSICAL THERAPIST

## 2022-02-02 ENCOUNTER — HOSPITAL ENCOUNTER (OUTPATIENT)
Dept: PHYSICAL THERAPY | Facility: OTHER | Age: 77
Setting detail: THERAPIES SERIES
End: 2022-02-02
Attending: INTERNAL MEDICINE
Payer: MEDICARE

## 2022-02-02 PROCEDURE — 97110 THERAPEUTIC EXERCISES: CPT | Mod: GP | Performed by: PHYSICAL THERAPIST

## 2022-02-02 PROCEDURE — 97140 MANUAL THERAPY 1/> REGIONS: CPT | Mod: GP | Performed by: PHYSICAL THERAPIST

## 2022-02-13 ENCOUNTER — ALLIED HEALTH/NURSE VISIT (OUTPATIENT)
Dept: FAMILY MEDICINE | Facility: OTHER | Age: 77
End: 2022-02-13
Attending: FAMILY MEDICINE
Payer: MEDICARE

## 2022-02-13 DIAGNOSIS — R52 BODY ACHES: ICD-10-CM

## 2022-02-13 DIAGNOSIS — R05.9 COUGH: Primary | ICD-10-CM

## 2022-02-13 PROCEDURE — U0003 INFECTIOUS AGENT DETECTION BY NUCLEIC ACID (DNA OR RNA); SEVERE ACUTE RESPIRATORY SYNDROME CORONAVIRUS 2 (SARS-COV-2) (CORONAVIRUS DISEASE [COVID-19]), AMPLIFIED PROBE TECHNIQUE, MAKING USE OF HIGH THROUGHPUT TECHNOLOGIES AS DESCRIBED BY CMS-2020-01-R: HCPCS | Mod: ZL

## 2022-02-13 PROCEDURE — C9803 HOPD COVID-19 SPEC COLLECT: HCPCS

## 2022-02-13 NOTE — NURSING NOTE
Chief Complaint   Patient presents with     Covid 19 Testing     cough, body aches     Patient swabbed for COVID-19 testing.  Niranjan Rios LPN on 2/13/2022 at 4:02 PM

## 2022-02-15 LAB — SARS-COV-2 RNA RESP QL NAA+PROBE: NEGATIVE

## 2022-03-03 ENCOUNTER — HOSPITAL ENCOUNTER (OUTPATIENT)
Dept: PHYSICAL THERAPY | Facility: OTHER | Age: 77
Setting detail: THERAPIES SERIES
End: 2022-03-03
Attending: INTERNAL MEDICINE
Payer: MEDICARE

## 2022-03-03 PROCEDURE — 97110 THERAPEUTIC EXERCISES: CPT | Mod: GP | Performed by: PHYSICAL THERAPIST

## 2022-03-03 PROCEDURE — 97140 MANUAL THERAPY 1/> REGIONS: CPT | Mod: GP | Performed by: PHYSICAL THERAPIST

## 2022-03-24 ENCOUNTER — HOSPITAL ENCOUNTER (OUTPATIENT)
Dept: PHYSICAL THERAPY | Facility: OTHER | Age: 77
Setting detail: THERAPIES SERIES
Discharge: HOME | End: 2022-03-24
Attending: INTERNAL MEDICINE
Payer: MEDICARE

## 2022-03-24 PROCEDURE — 97140 MANUAL THERAPY 1/> REGIONS: CPT | Mod: GP | Performed by: PHYSICAL THERAPIST

## 2022-03-24 PROCEDURE — 97110 THERAPEUTIC EXERCISES: CPT | Mod: GP | Performed by: PHYSICAL THERAPIST

## 2022-05-05 ENCOUNTER — OFFICE VISIT (OUTPATIENT)
Dept: PEDIATRICS | Facility: OTHER | Age: 77
End: 2022-05-05
Attending: INTERNAL MEDICINE
Payer: COMMERCIAL

## 2022-05-05 ENCOUNTER — APPOINTMENT (OUTPATIENT)
Dept: LAB | Facility: OTHER | Age: 77
End: 2022-05-05
Attending: INTERNAL MEDICINE
Payer: MEDICARE

## 2022-05-05 VITALS
SYSTOLIC BLOOD PRESSURE: 132 MMHG | OXYGEN SATURATION: 99 % | WEIGHT: 151 LBS | TEMPERATURE: 97.2 F | DIASTOLIC BLOOD PRESSURE: 80 MMHG | RESPIRATION RATE: 16 BRPM | HEIGHT: 68 IN | HEART RATE: 75 BPM | BODY MASS INDEX: 22.88 KG/M2

## 2022-05-05 DIAGNOSIS — E03.9 ACQUIRED HYPOTHYROIDISM: Chronic | ICD-10-CM

## 2022-05-05 DIAGNOSIS — R73.09 ELEVATED GLUCOSE LEVEL: ICD-10-CM

## 2022-05-05 DIAGNOSIS — I87.8 VENOUS STASIS OF BOTH LOWER EXTREMITIES: ICD-10-CM

## 2022-05-05 DIAGNOSIS — N18.31 STAGE 3A CHRONIC KIDNEY DISEASE (H): ICD-10-CM

## 2022-05-05 DIAGNOSIS — I10 ESSENTIAL HYPERTENSION: ICD-10-CM

## 2022-05-05 DIAGNOSIS — Z00.00 ENCOUNTER FOR MEDICARE ANNUAL WELLNESS EXAM: Primary | ICD-10-CM

## 2022-05-05 DIAGNOSIS — D49.0 IPMN (INTRADUCTAL PAPILLARY MUCINOUS NEOPLASM): ICD-10-CM

## 2022-05-05 DIAGNOSIS — Z23 HIGH PRIORITY FOR 2019-NCOV VACCINE: ICD-10-CM

## 2022-05-05 DIAGNOSIS — I25.10 ASCVD (ARTERIOSCLEROTIC CARDIOVASCULAR DISEASE): ICD-10-CM

## 2022-05-05 DIAGNOSIS — Z95.820 S/P ANGIOPLASTY WITH STENT: ICD-10-CM

## 2022-05-05 LAB
ALBUMIN SERPL-MCNC: 4.5 G/DL (ref 3.5–5.7)
ALP SERPL-CCNC: 81 U/L (ref 34–104)
ALT SERPL W P-5'-P-CCNC: 19 U/L (ref 7–52)
AMYLASE SERPL-CCNC: 74 U/L (ref 29–103)
ANION GAP SERPL CALCULATED.3IONS-SCNC: 6 MMOL/L (ref 3–14)
AST SERPL W P-5'-P-CCNC: 23 U/L (ref 13–39)
BASOPHILS # BLD AUTO: 0.1 10E3/UL (ref 0–0.2)
BASOPHILS NFR BLD AUTO: 1 %
BILIRUB SERPL-MCNC: 2.2 MG/DL (ref 0.3–1)
BUN SERPL-MCNC: 27 MG/DL (ref 7–25)
CALCIUM SERPL-MCNC: 9.8 MG/DL (ref 8.6–10.3)
CHLORIDE BLD-SCNC: 106 MMOL/L (ref 98–107)
CHOLEST SERPL-MCNC: 131 MG/DL
CO2 SERPL-SCNC: 29 MMOL/L (ref 21–31)
CREAT SERPL-MCNC: 1.32 MG/DL (ref 0.7–1.3)
EOSINOPHIL # BLD AUTO: 0.7 10E3/UL (ref 0–0.7)
EOSINOPHIL NFR BLD AUTO: 8 %
ERYTHROCYTE [DISTWIDTH] IN BLOOD BY AUTOMATED COUNT: 12.6 % (ref 10–15)
FASTING STATUS PATIENT QL REPORTED: NORMAL
GFR SERPL CREATININE-BSD FRML MDRD: 55 ML/MIN/1.73M2
GLUCOSE BLD-MCNC: 98 MG/DL (ref 70–105)
HBA1C MFR BLD: 6.1 % (ref 4–6.2)
HCT VFR BLD AUTO: 46.3 % (ref 40–53)
HDLC SERPL-MCNC: 54 MG/DL (ref 23–92)
HGB BLD-MCNC: 15.3 G/DL (ref 13.3–17.7)
IMM GRANULOCYTES # BLD: 0 10E3/UL
IMM GRANULOCYTES NFR BLD: 0 %
LDLC SERPL CALC-MCNC: 59 MG/DL
LIPASE SERPL-CCNC: 41 U/L (ref 11–82)
LYMPHOCYTES # BLD AUTO: 2 10E3/UL (ref 0.8–5.3)
LYMPHOCYTES NFR BLD AUTO: 25 %
MCH RBC QN AUTO: 33 PG (ref 26.5–33)
MCHC RBC AUTO-ENTMCNC: 33 G/DL (ref 31.5–36.5)
MCV RBC AUTO: 100 FL (ref 78–100)
MONOCYTES # BLD AUTO: 1.1 10E3/UL (ref 0–1.3)
MONOCYTES NFR BLD AUTO: 13 %
NEUTROPHILS # BLD AUTO: 4.3 10E3/UL (ref 1.6–8.3)
NEUTROPHILS NFR BLD AUTO: 53 %
NONHDLC SERPL-MCNC: 77 MG/DL
NRBC # BLD AUTO: 0 10E3/UL
NRBC BLD AUTO-RTO: 0 /100
PLATELET # BLD AUTO: 252 10E3/UL (ref 150–450)
POTASSIUM BLD-SCNC: 4 MMOL/L (ref 3.5–5.1)
PROT SERPL-MCNC: 6.9 G/DL (ref 6.4–8.9)
RBC # BLD AUTO: 4.63 10E6/UL (ref 4.4–5.9)
SODIUM SERPL-SCNC: 141 MMOL/L (ref 134–144)
TRIGL SERPL-MCNC: 91 MG/DL
TSH SERPL DL<=0.005 MIU/L-ACNC: 0.55 MU/L (ref 0.4–4)
WBC # BLD AUTO: 8.1 10E3/UL (ref 4–11)

## 2022-05-05 PROCEDURE — 0054A COVID-19,PF,PFIZER (12+ YRS): CPT

## 2022-05-05 PROCEDURE — 83036 HEMOGLOBIN GLYCOSYLATED A1C: CPT | Mod: ZL | Performed by: INTERNAL MEDICINE

## 2022-05-05 PROCEDURE — 82150 ASSAY OF AMYLASE: CPT | Mod: ZL | Performed by: INTERNAL MEDICINE

## 2022-05-05 PROCEDURE — 82043 UR ALBUMIN QUANTITATIVE: CPT | Mod: ZL | Performed by: INTERNAL MEDICINE

## 2022-05-05 PROCEDURE — G0463 HOSPITAL OUTPT CLINIC VISIT: HCPCS

## 2022-05-05 PROCEDURE — 84443 ASSAY THYROID STIM HORMONE: CPT | Mod: ZL | Performed by: INTERNAL MEDICINE

## 2022-05-05 PROCEDURE — 82040 ASSAY OF SERUM ALBUMIN: CPT | Mod: ZL | Performed by: INTERNAL MEDICINE

## 2022-05-05 PROCEDURE — 83690 ASSAY OF LIPASE: CPT | Mod: ZL | Performed by: INTERNAL MEDICINE

## 2022-05-05 PROCEDURE — G0439 PPPS, SUBSEQ VISIT: HCPCS | Performed by: INTERNAL MEDICINE

## 2022-05-05 PROCEDURE — 36415 COLL VENOUS BLD VENIPUNCTURE: CPT | Mod: ZL | Performed by: INTERNAL MEDICINE

## 2022-05-05 PROCEDURE — 80061 LIPID PANEL: CPT | Mod: ZL | Performed by: INTERNAL MEDICINE

## 2022-05-05 PROCEDURE — 85004 AUTOMATED DIFF WBC COUNT: CPT | Mod: ZL | Performed by: INTERNAL MEDICINE

## 2022-05-05 PROCEDURE — 91305 COVID-19,PF,PFIZER (12+ YRS): CPT | Performed by: INTERNAL MEDICINE

## 2022-05-05 PROCEDURE — 80053 COMPREHEN METABOLIC PANEL: CPT | Mod: ZL | Performed by: INTERNAL MEDICINE

## 2022-05-05 RX ORDER — LEVOTHYROXINE SODIUM 88 UG/1
88 TABLET ORAL DAILY
Qty: 90 TABLET | Refills: 3 | Status: SHIPPED | OUTPATIENT
Start: 2022-05-05 | End: 2023-05-25

## 2022-05-05 RX ORDER — METOPROLOL SUCCINATE 50 MG/1
50 TABLET, EXTENDED RELEASE ORAL DAILY
Qty: 90 TABLET | Refills: 3 | Status: SHIPPED | OUTPATIENT
Start: 2022-05-05 | End: 2023-05-25

## 2022-05-05 RX ORDER — ATORVASTATIN CALCIUM 10 MG/1
10 TABLET, FILM COATED ORAL DAILY
Qty: 90 TABLET | Refills: 3 | Status: SHIPPED | OUTPATIENT
Start: 2022-05-05 | End: 2023-05-25

## 2022-05-05 RX ORDER — RAMIPRIL 2.5 MG/1
2.5 CAPSULE ORAL DAILY
Qty: 90 CAPSULE | Refills: 3 | Status: SHIPPED | OUTPATIENT
Start: 2022-05-05 | End: 2023-05-25

## 2022-05-05 ASSESSMENT — ACTIVITIES OF DAILY LIVING (ADL): CURRENT_FUNCTION: NO ASSISTANCE NEEDED

## 2022-05-05 ASSESSMENT — PAIN SCALES - GENERAL: PAINLEVEL: MODERATE PAIN (5)

## 2022-05-05 NOTE — PATIENT INSTRUCTIONS
-- Low salt diet   -- Eat healthy protein   -- Learn about DASH Diet for dietary ways to reduce blood pressure  1. Google: RUST DASH diet  2. RUST site: https://www.nhlbi.nih.gov/health-topics/dash-eating-plan  3. PDF from RUST: https://www.nhlbi.nih.gov/files/docs/public/heart/new_dash.pdf   -- Elevate feet above your hips for 20-30 minutes, 4 times per day   -- Compression stockings from morning to night        Patient Education   Personalized Prevention Plan  You are due for the preventive services outlined below.  Your care team is available to assist you in scheduling these services.  If you have already completed any of these items, please share that information with your care team to update in your medical record.  Health Maintenance Due   Topic Date Due     Kidney Microalbumin Urine Test  Never done     Diptheria Tetanus Pertussis (DTAP/TDAP/TD) Vaccine (1 - Tdap) 06/17/2013     Pneumococcal Vaccine (3 - PPSV23 or PCV20) 06/17/2018     COVID-19 Vaccine (4 - Booster for Pfizer series) 01/21/2022       Understanding USDA MyPlate  The USDA has guidelines to help you make healthy food choices. These are called MyPlate. MyPlate shows the food groups that make up healthy meals using the image of a place setting. Before you eat, think about the healthiest choices for what to put on your plate or in your cup or bowl. To learn more about building a healthy plate, visit www.choosemyplate.gov.    The food groups    Fruits. Any fruit or 100% fruit juice counts as part of the Fruit Group. Fruits may be fresh, canned, frozen, or dried, and may be whole, cut-up, or pureed. Make 1/2 of your plate fruits and vegetables.    Vegetables. Any vegetable or 100% vegetable juice counts as a member of the Vegetable Group. Vegetables may be fresh, frozen, canned, or dried. They can be served raw or cooked and may be whole, cut-up, or mashed. Make 1/2 of your plate fruits and vegetables.    Grains. All foods made from grains are  part of the Grains Group. These include wheat, rice, oats, cornmeal, and barley. Grains are often used to make foods such as bread, pasta, oatmeal, cereal, tortillas, and grits. Grains should be no more than 1/4 of your plate. At least half of your grains should be whole grains.    Protein. This group includes meat, poultry, seafood, beans and peas, eggs, processed soy products (such as tofu), nuts (including nut butters), and seeds. Make protein choices no more than 1/4 of your plate. Meat and poultry choices should be lean or low fat.    Dairy. The Dairy Group includes all fluid milk products and foods made from milk that contain calcium, such as yogurt and cheese. (Foods that have little calcium, such as cream, butter, and cream cheese, are not part of this group.) Most dairy choices should be low-fat or fat-free.    Oils. Oils aren't a food group, but they do contain essential nutrients. However it's important to watch your intake of oils. These are fats that are liquid at room temperature. They include canola, corn, olive, soybean, vegetable, and sunflower oil. Foods that are mainly oil include mayonnaise, certain salad dressings, and soft margarines. You likely already get your daily oil allowance from the foods you eat.  Things to limit  Eating healthy also means limiting these things in your diet:       Salt (sodium). Many processed foods have a lot of sodium. To keep sodium intake down, eat fresh vegetables, meats, poultry, and seafood when possible. Purchase low-sodium, reduced-sodium, or no-salt-added food products at the store. And don't add salt to your meals at home. Instead, season them with herbs and spices such as dill, oregano, cumin, and paprika. Or try adding flavor with lemon or lime zest and juice.    Saturated fat. Saturated fats are most often found in animal products such as beef, pork, and chicken. They are often solid at room temperature, such as butter. To reduce your saturated fat  intake, choose leaner cuts of meat and poultry. And try healthier cooking methods such as grilling, broiling, roasting, or baking. For a simple lower-fat swap, use plain nonfat yogurt instead of mayonnaise when making potato salad or macaroni salad.    Added sugars. These are sugars added to foods. They are in foods such as ice cream, candy, soda, fruit drinks, sports drinks, energy drinks, cookies, pastries, jams, and syrups. Cut down on added sugars by sharing sweet treats with a family member or friend. You can also choose fruit for dessert, and drink water or other unsweetened beverages.     StayWell last reviewed this educational content on 6/1/2020 2000-2021 [employer]. All rights reserved. This information is not intended as a substitute for professional medical care. Always follow your healthcare professional's instructions.          Signs of Hearing Loss      Hearing much better with one ear can be a sign of hearing loss.   Hearing loss is a problem shared by many people. In fact, it is one of the most common health problems, particularly as people age. Most people age 65 and older have some hearing loss. By age 80, almost everyone does. Hearing loss often occurs slowly over the years. So you may not realize your hearing has gotten worse.  Have your hearing checked  Call your healthcare provider if you:    Have to strain to hear normal conversation    Have to watch other people s faces very carefully to follow what they re saying    Need to ask people to repeat what they ve said    Often misunderstand what people are saying    Turn the volume of the television or radio up so high that others complain    Feel that people are mumbling when they re talking to you    Find that the effort to hear leaves you feeling tired and irritated    Notice, when using the phone, that you hear better with one ear than the other  [employer] last reviewed this educational content on 1/1/2020 2000-2021 The  [employer]. All rights reserved. This information is not intended as a substitute for professional medical care. Always follow your healthcare professional's instructions.

## 2022-05-05 NOTE — NURSING NOTE
"Chief Complaint   Patient presents with     Medicare Visit     annual       Initial /80   Pulse 75   Temp 97.2  F (36.2  C) (Temporal)   Resp 16   Ht 1.727 m (5' 8\")   Wt 68.5 kg (151 lb)   SpO2 99%   BMI 22.96 kg/m   Estimated body mass index is 22.96 kg/m  as calculated from the following:    Height as of this encounter: 1.727 m (5' 8\").    Weight as of this encounter: 68.5 kg (151 lb).  Medication Reconciliation: complete    FOOD SECURITY SCREENING QUESTIONS  Hunger Vital Signs:  Within the past 12 months we worried whether our food would run out before we got money to buy more. Never  Within the past 12 months the food we bought just didn't last and we didn't have money to get more. Never        Advance care directive on file? yes      Jackie Tobias LPN  "

## 2022-05-05 NOTE — PROGRESS NOTES
"SUBJECTIVE:   Sandro Yoon is a 77 year old male who presents for Preventive Visit.    He has been experiencing some lower extremity edema left greater than right worse in the evening and better in the morning.  No calf tenderness to palpation.  No significant dyspnea.  No change in exercise tolerance.      Patient has been advised of split billing requirements and indicates understanding: Yes  Are you in the first 12 months of your Medicare coverage?  No    Healthy Habits:     In general, how would you rate your overall health?  Good    Frequency of exercise:  4-5 days/week    Duration of exercise:  15-30 minutes    Do you usually eat at least 4 servings of fruit and vegetables a day, include whole grains    & fiber and avoid regularly eating high fat or \"junk\" foods?  No    Taking medications regularly:  Yes    Medication side effects:  Muscle aches    Ability to successfully perform activities of daily living:  No assistance needed    Home Safety:  No safety concerns identified    Hearing Impairment:  Difficulty following a conversation in a noisy restaurant or crowded room, need to ask people to speak up or repeat themselves and difficulty understanding soft or whispered speech    In the past 6 months, have you been bothered by leaking of urine?  No    In general, how would you rate your overall mental or emotional health?  Good      PHQ-2 Total Score: 1    Additional concerns today:  Yes    Do you feel safe in your environment? Yes    Have you ever done Advance Care Planning? (For example, a Health Directive, POLST, or a discussion with a medical provider or your loved ones about your wishes): Yes, advance care planning is on file.       Fall risk  Fallen 2 or more times in the past year?: No  Any fall with injury in the past year?: No    Cognitive Screening   1) Repeat 3 items (Leader, Season, Table)    2) Clock draw: NORMAL  3) 3 item recall: Recalls 1 object   Results: NORMAL clock, 1-2 items " "recalled: COGNITIVE IMPAIRMENT LESS LIKELY    Mini-CogTM Copyright WENDY Mccracken. Licensed by the author for use in [facility]; reprinted with permission (bert@[facility]). All rights reserved.      Do you have sleep apnea, excessive snoring or daytime drowsiness?: yes day time drowsiness    Reviewed and updated as needed this visit by clinical staff   Tobacco  Allergies  Meds  Problems  Med Hx  Surg Hx  Fam Hx  Soc   Hx          Reviewed and updated as needed this visit by Provider    Allergies  Meds  Problems              Social History     Tobacco Use     Smoking status: Never Smoker     Smokeless tobacco: Never Used   Substance Use Topics     Alcohol use: Not Currently     Comment: zero use since 6/2018     If you drink alcohol do you typically have >3 drinks per day or >7 drinks per week? No    Alcohol Use 5/5/2022   Prescreen: >3 drinks/day or >7 drinks/week? Not Applicable   Prescreen: >3 drinks/day or >7 drinks/week? -               Current providers sharing in care for this patient include:   Patient Care Team:  Guero Turner MD as PCP - General (Pediatrics)  Guero Turner MD as Assigned PCP    The following health maintenance items are reviewed in Epic and correct as of today:  Health Maintenance Due   Topic Date Due     MICROALBUMIN  Never done     DTAP/TDAP/TD IMMUNIZATION (1 - Tdap) 06/17/2013     Pneumococcal Vaccine: 65+ Years (3 - PPSV23 or PCV20) 06/17/2018     COVID-19 Vaccine (4 - Booster for Pfizer series) 01/21/2022     Labs reviewed in [facility]  Immunizations are reviewed today        Review of Systems  Constitutional, HEENT, cardiovascular, pulmonary, gi and gu systems are negative, except as otherwise noted.    OBJECTIVE:   /80   Pulse 75   Temp 97.2  F (36.2  C) (Temporal)   Resp 16   Ht 1.727 m (5' 8\")   Wt 68.5 kg (151 lb)   SpO2 99%   BMI 22.96 kg/m   Estimated body mass index is 22.96 kg/m  as calculated from the following:    Height as of this " "encounter: 1.727 m (5' 8\").    Weight as of this encounter: 68.5 kg (151 lb).  Physical Exam  HEENT: No carotid bruits  Cardiovascular regular rate and rhythm, no murmur  Pulmonary: Clear  MSK trace pitting edema lower extremities to the mid shin    Diagnostic Test Results:  Labs reviewed in Epic    ASSESSMENT / PLAN:       ICD-10-CM    1. Encounter for Medicare annual wellness exam  Z00.00    2. ASCVD (arteriosclerotic cardiovascular disease)  I25.10 atorvastatin (LIPITOR) 10 MG tablet     Lipid Profile     Lipid Profile   3. Acquired hypothyroidism  E03.9 levothyroxine (SYNTHROID/LEVOTHROID) 88 MCG tablet     TSH     TSH   4. Essential hypertension  I10 metoprolol succinate ER (TOPROL XL) 50 MG 24 hr tablet     ramipril (ALTACE) 2.5 MG capsule     Comprehensive metabolic panel     Comprehensive metabolic panel   5. Venous stasis of both lower extremities  I87.8    6. Stage 3a chronic kidney disease (H)  N18.31 CBC with Platelets & Differential     Comprehensive metabolic panel     Albumin Random Urine Quantitative with Creat Ratio     CBC with Platelets & Differential     Comprehensive metabolic panel     Albumin Random Urine Quantitative with Creat Ratio   7. S/P angioplasty with stent  Z95.820    8. Elevated glucose level  R73.09 Hemoglobin A1c     Hemoglobin A1c   9. IPMN (intraductal papillary mucinous neoplasm)  D49.0 Comprehensive metabolic panel     Lipase     Amylase     Comprehensive metabolic panel     Lipase     Amylase           COUNSELING:  Reviewed preventive health counseling, as reflected in patient instructions    Estimated body mass index is 22.96 kg/m  as calculated from the following:    Height as of this encounter: 1.727 m (5' 8\").    Weight as of this encounter: 68.5 kg (151 lb).        He reports that he has never smoked. He has never used smokeless tobacco.      Appropriate preventive services were discussed with this patient, including applicable screening as appropriate for cardiovascular " disease, diabetes, osteopenia/osteoporosis, and glaucoma.  As appropriate for age/gender, discussed screening for colorectal cancer, prostate cancer, breast cancer, and cervical cancer. Checklist reviewing preventive services available has been given to the patient.    Reviewed patients plan of care and provided an AVS. The Basic Care Plan (routine screening as documented in Health Maintenance) for Sandro meets the Care Plan requirement. This Care Plan has been established and reviewed with the Patient.    Counseling Resources:  ATP IV Guidelines  Pooled Cohorts Equation Calculator  Breast Cancer Risk Calculator  Breast Cancer: Medication to Reduce Risk  FRAX Risk Assessment  ICSI Preventive Guidelines  Dietary Guidelines for Americans, 2010  USDA's MyPlate  ASA Prophylaxis  Lung CA Screening    Guero Turner MD  [facility]    Identified Health Risks:

## 2022-05-06 LAB
CREAT UR-MCNC: 209 MG/DL
MICROALBUMIN UR-MCNC: 23 MG/L
MICROALBUMIN/CREAT UR: 11 MG/G CR (ref 0–17)

## 2022-06-23 ENCOUNTER — TRANSFERRED RECORDS (OUTPATIENT)
Dept: HEALTH INFORMATION MANAGEMENT | Facility: OTHER | Age: 77
End: 2022-06-23

## 2022-08-22 ENCOUNTER — VIRTUAL VISIT (OUTPATIENT)
Dept: FAMILY MEDICINE | Facility: OTHER | Age: 77
End: 2022-08-22
Attending: INTERNAL MEDICINE
Payer: COMMERCIAL

## 2022-08-22 VITALS — WEIGHT: 150.4 LBS | TEMPERATURE: 99.3 F | BODY MASS INDEX: 22.87 KG/M2

## 2022-08-22 DIAGNOSIS — U07.1 INFECTION DUE TO 2019 NOVEL CORONAVIRUS: Primary | ICD-10-CM

## 2022-08-22 PROCEDURE — 99212 OFFICE O/P EST SF 10 MIN: CPT | Mod: 95 | Performed by: NURSE PRACTITIONER

## 2022-08-22 ASSESSMENT — PAIN SCALES - GENERAL: PAINLEVEL: MILD PAIN (3)

## 2022-08-22 NOTE — PROGRESS NOTES
Jon is a 77 year old who is being evaluated via a billable telephone visit.      What phone number would you like to be contacted at? [phone]   How would you like to obtain your AVS? [facility]    Assessment & Plan   Problem List Items Addressed This Visit    None     Visit Diagnoses     Infection due to 2019 novel coronavirus    -  Primary          I discussed risk versus benefit of the antiviral medicine with patient.  It was determined today that he is in eligible for this medication as he is taking Brilinta which is very important drug for him to take. This is contraindicated with Paxilbid. I did offer him molnupiravir for this which he declined. No medication was prescribed.     No follow-ups on file.    Rochelle Holloway NP  [facility]    Subjective   Jon is a 77 year old accompanied by his spouse, presenting for the following health issues:  Covid Concern (Wants to talk about getting the antiviral for Covid)    Symptoms started today, shaky, fevers, chills, cough. Positive today. Wheezing. 4 vaccines for covid 19.     HPI       COVID-19 Symptom Review  How many days ago did these symptoms start? Today; tested positive today.     Are any of the following symptoms significant for you?  New or worsening difficulty breathing? Yes, slight wheezing    Please describe what kind of difficulty you are having breathing:No dyspnea, or dyspnea at patients normal baseline    Worsening cough? Yes, it's a dry cough.     Fever or chills? Yes, I felt feverish or had chills.    Headache: No    Sore throat: No    Chest pain: No    Diarrhea: No    Body aches? YES    What treatments has patient tried? none   Does patient live in a nursing home, group home, or shelter? No  Does patient have a way to get food/medications during quarantined? Yes, I have a friend or family member who can help me.    Review of Systems   Constitutional, HEENT, cardiovascular, pulmonary, gi and gu systems are negative, except as  otherwise noted.      Objective    Vitals - Patient Reported  Pain Score: Mild Pain (3)  Pain Loc: Low Back    Physical Exam   healthy, alert and no distress  PSYCH: Alert and oriented times 3; coherent speech, normal   rate and volume, able to articulate logical thoughts, able   to abstract reason, no tangential thoughts, no hallucinations   or delusions  His affect is normal  RESP: No cough, no audible wheezing, able to talk in full sentences  Remainder of exam unable to be completed due to telephone visits     Phone call duration: 10  minutes

## 2022-08-22 NOTE — NURSING NOTE
"Chief Complaint   Patient presents with     Covid Concern     Wants to talk about getting the antiviral for Covid   He started having chills and a cough today . He checked for Covid and he was positive.  Jackie Tobias LPN..................8/22/2022   5:26 PM      Initial Temp 99.3  F (37.4  C)   Wt 68.2 kg (150 lb 6.4 oz)   BMI 22.87 kg/m   Estimated body mass index is 22.87 kg/m  as calculated from the following:    Height as of 5/5/22: 1.727 m (5' 8\").    Weight as of this encounter: 68.2 kg (150 lb 6.4 oz).  Medication Reconciliation: complete    FOOD SECURITY SCREENING QUESTIONS  Hunger Vital Signs:  Within the past 12 months we worried whether our food would run out before we got money to buy more. Never  Within the past 12 months the food we bought just didn't last and we didn't have money to get more. Never        Advance care directive on file? Yes  Jackie Tobias LPN..................8/22/2022   5:24 PM      "

## 2022-09-17 ENCOUNTER — HEALTH MAINTENANCE LETTER (OUTPATIENT)
Age: 77
End: 2022-09-17

## 2022-09-23 ENCOUNTER — ALLIED HEALTH/NURSE VISIT (OUTPATIENT)
Dept: FAMILY MEDICINE | Facility: OTHER | Age: 77
End: 2022-09-23
Attending: INTERNAL MEDICINE
Payer: MEDICARE

## 2022-09-23 DIAGNOSIS — Z23 NEED FOR PROPHYLACTIC VACCINATION AND INOCULATION AGAINST INFLUENZA: Primary | ICD-10-CM

## 2022-09-23 PROCEDURE — G0008 ADMIN INFLUENZA VIRUS VAC: HCPCS

## 2022-09-23 NOTE — NURSING NOTE
Immunization Documentation    Prior to Flu Immunization administration, verified patients identity using patient's name and date of birth. Please see IMMUNIZATIONS  and order for additional information.  Patient / Parent instructed to remain in clinic for 15 minutes and report any adverse reaction to staff immediately.    Was entire vial of medication used? Yes  Vial/Syringe: Single dose vial   Deena Herman LPN on 9/23/2022 at 10:27 AM

## 2022-11-22 ENCOUNTER — IMMUNIZATION (OUTPATIENT)
Dept: FAMILY MEDICINE | Facility: OTHER | Age: 77
End: 2022-11-22
Attending: INTERNAL MEDICINE
Payer: MEDICARE

## 2022-11-22 DIAGNOSIS — Z23 HIGH PRIORITY FOR 2019-NCOV VACCINE: Primary | ICD-10-CM

## 2022-11-22 PROCEDURE — 91312 COVID-19,PF,PFIZER BOOSTER BIVALENT: CPT

## 2022-11-22 PROCEDURE — 0124A COVID-19,PF,PFIZER BOOSTER BIVALENT: CPT

## 2022-12-28 NOTE — PROGRESS NOTES
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  IT band pain     Client Self Report: has been doing pretty good, trying to stay active and hip/back have had less flare ups.       Goals:  Goal Identifier Pain   Goal Description Patient report no ore than 4 out of 10 with sitting or walking   Target Date 21   Date Met      Progress (detail required for progress note):       Goal Identifier Home exercise program   Goal Description Patient will maintain home exercise program at least 3 times a week to reduce pain and improve mobility   Target Date 21   Date Met      Progress (detail required for progress note):       Goal Identifier Sitting   Goal Description Patient reports that he can sit car ride for up to 2 hours while maintaining good posture and reduction of pain more than 4 out of 10   Target Date 09/10/21   Date Met      Progress (detail required for progress note):             Plan:  Discharge from therapy.    Discharge:    Reason for Discharge: Patient has failed to schedule further appointments.    Equipment Issued: na    Discharge Plan: Patient to continue home program.

## 2023-05-19 ASSESSMENT — ENCOUNTER SYMPTOMS
HEADACHES: 0
HEMATURIA: 0
FEVER: 0
PALPITATIONS: 0
NERVOUS/ANXIOUS: 0
JOINT SWELLING: 0
DYSURIA: 0
SHORTNESS OF BREATH: 0
FREQUENCY: 1
EYE PAIN: 0
CHILLS: 0
HEMATOCHEZIA: 0
DIZZINESS: 0
SORE THROAT: 0
MYALGIAS: 1
DIARRHEA: 0
NAUSEA: 0
CONSTIPATION: 0
COUGH: 1
ARTHRALGIAS: 1
ABDOMINAL PAIN: 0
PARESTHESIAS: 0
HEARTBURN: 1
WEAKNESS: 1

## 2023-05-19 ASSESSMENT — ACTIVITIES OF DAILY LIVING (ADL): CURRENT_FUNCTION: NO ASSISTANCE NEEDED

## 2023-05-25 ENCOUNTER — OFFICE VISIT (OUTPATIENT)
Dept: PEDIATRICS | Facility: OTHER | Age: 78
End: 2023-05-25
Attending: INTERNAL MEDICINE
Payer: COMMERCIAL

## 2023-05-25 VITALS
DIASTOLIC BLOOD PRESSURE: 82 MMHG | HEIGHT: 68 IN | OXYGEN SATURATION: 97 % | RESPIRATION RATE: 16 BRPM | SYSTOLIC BLOOD PRESSURE: 134 MMHG | HEART RATE: 76 BPM | TEMPERATURE: 97.3 F | WEIGHT: 153.2 LBS | BODY MASS INDEX: 23.22 KG/M2

## 2023-05-25 DIAGNOSIS — D49.0 IPMN (INTRADUCTAL PAPILLARY MUCINOUS NEOPLASM): Chronic | ICD-10-CM

## 2023-05-25 DIAGNOSIS — Z12.11 COLON CANCER SCREENING: ICD-10-CM

## 2023-05-25 DIAGNOSIS — R73.03 PRE-DIABETES: ICD-10-CM

## 2023-05-25 DIAGNOSIS — E03.9 ACQUIRED HYPOTHYROIDISM: ICD-10-CM

## 2023-05-25 DIAGNOSIS — I10 ESSENTIAL HYPERTENSION: ICD-10-CM

## 2023-05-25 DIAGNOSIS — Z00.00 ENCOUNTER FOR MEDICARE ANNUAL WELLNESS EXAM: Primary | ICD-10-CM

## 2023-05-25 DIAGNOSIS — N18.31 STAGE 3A CHRONIC KIDNEY DISEASE (H): ICD-10-CM

## 2023-05-25 DIAGNOSIS — E78.2 MIXED HYPERLIPIDEMIA: Chronic | ICD-10-CM

## 2023-05-25 DIAGNOSIS — J30.9 ALLERGIC RHINITIS, UNSPECIFIED SEASONALITY, UNSPECIFIED TRIGGER: ICD-10-CM

## 2023-05-25 DIAGNOSIS — I25.10 ASCVD (ARTERIOSCLEROTIC CARDIOVASCULAR DISEASE): ICD-10-CM

## 2023-05-25 DIAGNOSIS — I20.89 ATYPICAL ANGINA (H): ICD-10-CM

## 2023-05-25 DIAGNOSIS — K40.90 DIRECT LEFT INGUINAL HERNIA: ICD-10-CM

## 2023-05-25 DIAGNOSIS — M47.816 LUMBAR FACET ARTHROPATHY: ICD-10-CM

## 2023-05-25 DIAGNOSIS — I25.118 CORONARY ARTERY DISEASE OF NATIVE ARTERY OF NATIVE HEART WITH STABLE ANGINA PECTORIS (H): ICD-10-CM

## 2023-05-25 DIAGNOSIS — M48.061 LUMBAR FORAMINAL STENOSIS: ICD-10-CM

## 2023-05-25 DIAGNOSIS — M51.369 DDD (DEGENERATIVE DISC DISEASE), LUMBAR: ICD-10-CM

## 2023-05-25 LAB
ANION GAP SERPL CALCULATED.3IONS-SCNC: 10 MMOL/L (ref 7–15)
BUN SERPL-MCNC: 19.5 MG/DL (ref 8–23)
CALCIUM SERPL-MCNC: 10.4 MG/DL (ref 8.8–10.2)
CHLORIDE SERPL-SCNC: 105 MMOL/L (ref 98–107)
CHOLEST SERPL-MCNC: 141 MG/DL
CREAT SERPL-MCNC: 1.26 MG/DL (ref 0.67–1.17)
CREAT UR-MCNC: 125.7 MG/DL
DEPRECATED HCO3 PLAS-SCNC: 29 MMOL/L (ref 22–29)
GFR SERPL CREATININE-BSD FRML MDRD: 58 ML/MIN/1.73M2
GLUCOSE SERPL-MCNC: 100 MG/DL (ref 70–99)
HBA1C MFR BLD: 6.1 % (ref 4–6.2)
HDLC SERPL-MCNC: 57 MG/DL
HGB BLD-MCNC: 16 G/DL (ref 13.3–17.7)
HOLD SPECIMEN: NORMAL
LDLC SERPL CALC-MCNC: 68 MG/DL
MICROALBUMIN UR-MCNC: <12 MG/L
MICROALBUMIN/CREAT UR: NORMAL MG/G{CREAT}
NONHDLC SERPL-MCNC: 84 MG/DL
POTASSIUM SERPL-SCNC: 4.2 MMOL/L (ref 3.4–5.3)
SODIUM SERPL-SCNC: 144 MMOL/L (ref 136–145)
TRIGL SERPL-MCNC: 82 MG/DL
TSH SERPL DL<=0.005 MIU/L-ACNC: 0.88 UIU/ML (ref 0.3–4.2)

## 2023-05-25 PROCEDURE — G0463 HOSPITAL OUTPT CLINIC VISIT: HCPCS

## 2023-05-25 PROCEDURE — 85018 HEMOGLOBIN: CPT | Mod: ZL | Performed by: INTERNAL MEDICINE

## 2023-05-25 PROCEDURE — 84443 ASSAY THYROID STIM HORMONE: CPT | Mod: ZL | Performed by: INTERNAL MEDICINE

## 2023-05-25 PROCEDURE — 82570 ASSAY OF URINE CREATININE: CPT | Mod: ZL | Performed by: INTERNAL MEDICINE

## 2023-05-25 PROCEDURE — 80061 LIPID PANEL: CPT | Mod: ZL | Performed by: INTERNAL MEDICINE

## 2023-05-25 PROCEDURE — 36415 COLL VENOUS BLD VENIPUNCTURE: CPT | Mod: ZL | Performed by: INTERNAL MEDICINE

## 2023-05-25 PROCEDURE — G0439 PPPS, SUBSEQ VISIT: HCPCS | Performed by: INTERNAL MEDICINE

## 2023-05-25 PROCEDURE — 80048 BASIC METABOLIC PNL TOTAL CA: CPT | Mod: ZL | Performed by: INTERNAL MEDICINE

## 2023-05-25 PROCEDURE — 83036 HEMOGLOBIN GLYCOSYLATED A1C: CPT | Mod: ZL | Performed by: INTERNAL MEDICINE

## 2023-05-25 RX ORDER — LEVOTHYROXINE SODIUM 88 UG/1
88 TABLET ORAL DAILY
Qty: 90 TABLET | Refills: 3 | Status: SHIPPED | OUTPATIENT
Start: 2023-05-25 | End: 2023-07-09

## 2023-05-25 RX ORDER — METOPROLOL SUCCINATE 50 MG/1
50 TABLET, EXTENDED RELEASE ORAL DAILY
Qty: 90 TABLET | Refills: 3 | Status: SHIPPED | OUTPATIENT
Start: 2023-05-25 | End: 2023-07-09

## 2023-05-25 RX ORDER — NITROGLYCERIN 0.4 MG/1
TABLET SUBLINGUAL
Qty: 25 TABLET | Refills: 3 | Status: SHIPPED | OUTPATIENT
Start: 2023-05-25 | End: 2024-05-31

## 2023-05-25 RX ORDER — FLUTICASONE PROPIONATE 50 MCG
2 SPRAY, SUSPENSION (ML) NASAL DAILY
Qty: 48 G | Refills: 3 | Status: SHIPPED | OUTPATIENT
Start: 2023-05-25 | End: 2024-05-31

## 2023-05-25 RX ORDER — ATORVASTATIN CALCIUM 10 MG/1
10 TABLET, FILM COATED ORAL DAILY
Qty: 90 TABLET | Refills: 3 | Status: SHIPPED | OUTPATIENT
Start: 2023-05-25 | End: 2023-07-19

## 2023-05-25 RX ORDER — RAMIPRIL 2.5 MG/1
2.5 CAPSULE ORAL DAILY
Qty: 90 CAPSULE | Refills: 3 | Status: SHIPPED | OUTPATIENT
Start: 2023-05-25 | End: 2024-05-31

## 2023-05-25 ASSESSMENT — PAIN SCALES - GENERAL: PAINLEVEL: MODERATE PAIN (5)

## 2023-05-25 ASSESSMENT — ACTIVITIES OF DAILY LIVING (ADL): CURRENT_FUNCTION: NO ASSISTANCE NEEDED

## 2023-05-25 NOTE — PATIENT INSTRUCTIONS
-- Try a truss   -- Consider General Surgery consult     -- Cologuard     -- Plan for MRI of pancreas next year     -- Tdap at pharmacy      Patient Education   Personalized Prevention Plan  You are due for the preventive services outlined below.  Your care team is available to assist you in scheduling these services.  If you have already completed any of these items, please share that information with your care team to update in your medical record.  Health Maintenance Due   Topic Date Due    Basic Metabolic Panel  05/05/2023    Cholesterol Lab  05/05/2023    Kidney Microalbumin Urine Test  05/05/2023    Thyroid Function Lab  05/05/2023    Hemoglobin  05/05/2023       Understanding USDA MyPlate  The USDA has guidelines to help you make healthy food choices. These are called MyPlate. MyPlate shows the food groups that make up healthy meals using the image of a place setting. Before you eat, think about the healthiest choices for what to put on your plate or in your cup or bowl. To learn more about building a healthy plate, visit www.choosemyplate.gov.     The food groups  Fruits. Any fruit or 100% fruit juice counts as part of the Fruit Group. Fruits may be fresh, canned, frozen, or dried, and may be whole, cut-up, or pureed. Make 1/2 of your plate fruits and vegetables.  Vegetables. Any vegetable or 100% vegetable juice counts as a member of the Vegetable Group. Vegetables may be fresh, frozen, canned, or dried. They can be served raw or cooked and may be whole, cut-up, or mashed. Make 1/2 of your plate fruits and vegetables.  Grains. All foods made from grains are part of the Grains Group. These include wheat, rice, oats, cornmeal, and barley. Grains are often used to make foods such as bread, pasta, oatmeal, cereal, tortillas, and grits. Grains should be no more than 1/4 of your plate. At least half of your grains should be whole grains.  Protein. This group includes meat, poultry, seafood, beans and peas, eggs,  processed soy products (such as tofu), nuts (including nut butters), and seeds. Make protein choices no more than 1/4 of your plate. Meat and poultry choices should be lean or low fat.  Dairy. The Dairy Group includes all fluid milk products and foods made from milk that contain calcium, such as yogurt and cheese. (Foods that have little calcium, such as cream, butter, and cream cheese, are not part of this group.) Most dairy choices should be low-fat or fat-free.  Oils. Oils aren't a food group, but they do contain essential nutrients. However it's important to watch your intake of oils. These are fats that are liquid at room temperature. They include canola, corn, olive, soybean, vegetable, and sunflower oil. Foods that are mainly oil include mayonnaise, certain salad dressings, and soft margarines. You likely already get your daily oil allowance from the foods you eat.  Things to limit  Eating healthy also means limiting these things in your diet:  Salt (sodium). Many processed foods have a lot of sodium. To keep sodium intake down, eat fresh vegetables, meats, poultry, and seafood when possible. Purchase low-sodium, reduced-sodium, or no-salt-added food products at the store. And don't add salt to your meals at home. Instead, season them with herbs and spices such as dill, oregano, cumin, and paprika. Or try adding flavor with lemon or lime zest and juice.  Saturated fat. Saturated fats are most often found in animal products such as beef, pork, and chicken. They are often solid at room temperature, such as butter. To reduce your saturated fat intake, choose leaner cuts of meat and poultry. And try healthier cooking methods such as grilling, broiling, roasting, or baking. For a simple lower-fat swap, use plain nonfat yogurt instead of mayonnaise when making potato salad or macaroni salad.  Added sugars. These are sugars added to foods. They are in foods such as ice cream, candy, soda, fruit drinks, sports  drinks, energy drinks, cookies, pastries, jams, and syrups. Cut down on added sugars by sharing sweet treats with a family member or friend. You can also choose fruit for dessert, and drink water or other unsweetened beverages.  [employer] last reviewed this educational content on 6/1/2020 2000-2022 [employer]. All rights reserved. This information is not intended as a substitute for professional medical care. Always follow your healthcare professional's instructions.          Signs of Hearing Loss  Hearing loss is a problem shared by many people. In fact, it's one of the most common health problems, particularly as people age. Most people aged 65 and older have some hearing loss. By age 80, almost everyone does. Hearing loss often occurs slowly over the years. So, you may not realize your hearing has gotten worse.   When sudden hearing loss occurs, it's important to contact your healthcare provider right away. Your provider will do a medical exam and a hearing exam as soon as possible. This is to help find the cause and type of your sudden hearing loss. Based on your diagnosis, your healthcare provider will discuss possible treatments.      Hearing much better with one ear can be a sign of hearing loss.     Have your hearing checked  Call your healthcare provider if you:   Have to strain to hear normal conversation  Have to watch other people s faces very carefully to follow what they re saying  Need to ask people to repeat what they ve said  Often misunderstand what people are saying  Turn the volume of the television or radio up so high that others complain  Feel that people are mumbling when they re talking to you  Find that the effort to hear leaves you feeling tired and irritated  Notice, when using the phone, that you hear better with one ear than the other  [employer] last reviewed this educational content on 6/1/2022 2000-2022 [employer]. All rights reserved. This information  is not intended as a substitute for professional medical care. Always follow your healthcare professional's instructions.

## 2023-05-25 NOTE — PROGRESS NOTES
"SUBJECTIVE:   Jon is a 78 year old who presents for Preventive Visit.    His back pain has flared up despite home exercise program.  He requests return to [place] from physical therapy.      Healthy Habits:     In general, how would you rate your overall health?  Good    Frequency of exercise:  4-5 days/week    Duration of exercise:  15-30 minutes    Do you usually eat at least 4 servings of fruit and vegetables a day, include whole grains    & fiber and avoid regularly eating high fat or \"junk\" foods?  No    Taking medications regularly:  Yes    Medication side effects:  None    Ability to successfully perform activities of daily living:  No assistance needed    Home Safety:  Throw rugs in the hallway    Hearing Impairment:  Difficulty following a conversation in a noisy restaurant or crowded room, feel that people are mumbling or not speaking clearly, need to ask people to speak up or repeat themselves and difficulty understanding soft or whispered speech    In the past 6 months, have you been bothered by leaking of urine?  No    In general, how would you rate your overall mental or emotional health?  Good      PHQ-2 Total Score: 1    Additional concerns today:  Yes        Have you ever done Advance Care Planning? (For example, a Health Directive, POLST, or a discussion with a medical provider or your loved ones about your wishes): Yes, advance care planning is on file.       Fall risk  Fallen 2 or more times in the past year?: No  Any fall with injury in the past year?: No    Cognitive Screening   1) Repeat 3 items (Leader, Season, Table)    2) Clock draw: NORMAL  3) 3 item recall: Recalls 3 objects  Results: 3 items recalled: COGNITIVE IMPAIRMENT LESS LIKELY    Mini-CogTM Copyright WENDY Mccracken. Licensed by the author for use in [facility]; reprinted with permission (bert@.[facility]). All rights reserved.      Do you have sleep apnea, excessive snoring or daytime drowsiness?: no    Reviewed and updated as " needed this visit by clinical staff   Tobacco  Allergies  Meds   Med Hx  Surg Hx  Fam Hx  Soc Hx        Reviewed and updated as needed this visit by Provider                 Social History     Tobacco Use     Smoking status: Never     Smokeless tobacco: Never   Vaping Use     Vaping status: Never Used   Substance Use Topics     Alcohol use: Not Currently     Comment: zero use since 6/2018 5/19/2023     4:23 PM   Alcohol Use   Prescreen: >3 drinks/day or >7 drinks/week? Not Applicable          View : No data to display.              Do you have a current opioid prescription? No  Do you use any other controlled substances or medications that are not prescribed by a provider? None              Current providers sharing in care for this patient include:   Patient Care Team:  Guero Turner MD as PCP - General (Pediatrics)  Guero Turner MD as Assigned PCP   [facility]    The following health maintenance items are reviewed in Epic and correct as of today:  Health Maintenance   Topic Date Due     BMP  05/05/2023     LIPID  05/05/2023     MICROALBUMIN  05/05/2023     MEDICARE ANNUAL WELLNESS VISIT  05/05/2023     TSH W/FREE T4 REFLEX  05/05/2023     HEMOGLOBIN  05/05/2023     DTAP/TDAP/TD IMMUNIZATION (2 - Td or Tdap) 06/17/2023     FALL RISK ASSESSMENT  05/25/2024     ADVANCE CARE PLANNING  05/05/2027     HEPATITIS C SCREENING  Completed     PHQ-2 (once per calendar year)  Completed     INFLUENZA VACCINE  Completed     Pneumococcal Vaccine: 65+ Years  Completed     URINALYSIS  Completed     ZOSTER IMMUNIZATION  Completed     COVID-19 Vaccine  Completed     IPV IMMUNIZATION  Aged Out     MENINGITIS IMMUNIZATION  Aged Out     COLORECTAL CANCER SCREENING  Discontinued     Lab work is in process  Labs reviewed in EPIC  Imm reviewed        Review of Systems  Constitutional, HEENT, cardiovascular, pulmonary, gi and gu systems are negative, except as otherwise noted.    OBJECTIVE:   BP  "134/82   Pulse 76   Temp 97.3  F (36.3  C) (Tympanic)   Resp 16   Ht 1.727 m (5' 8\")   Wt 69.5 kg (153 lb 3.2 oz)   SpO2 97%   BMI 23.29 kg/m   Estimated body mass index is 23.29 kg/m  as calculated from the following:    Height as of this encounter: 1.727 m (5' 8\").    Weight as of this encounter: 69.5 kg (153 lb 3.2 oz).  Physical Exam  Gen: Alert, NAD.  Neck: No carotid bruits  CV: RRR no m/r/g  Pulm: CTAB, no w/r/r. No increased work of breathing  Msk: No LE edema  Neuro: Grossly intact  Skin: No concerning lesions.  Psychiatric: Normal affect and insight. Does not appear anxious or depressed.        Diagnostic Test Results:  Labs reviewed in Epic    ASSESSMENT / PLAN:       ICD-10-CM    1. Encounter for Medicare annual wellness exam  Z00.00       2. Stage 3a chronic kidney disease (H)  N18.31 BASIC METABOLIC PANEL     Albumin Random Urine Quantitative with Creat Ratio     Hemoglobin     Hemoglobin     Albumin Random Urine Quantitative with Creat Ratio     BASIC METABOLIC PANEL      3. Coronary artery disease of native artery of native heart with stable angina pectoris (H)  I25.118 Lipid Profile     Lipid Profile      4. Acquired hypothyroidism  E03.9 levothyroxine (SYNTHROID/LEVOTHROID) 88 MCG tablet     TSH with free T4 reflex     TSH with free T4 reflex      5. ASCVD (arteriosclerotic cardiovascular disease)  I25.10 atorvastatin (LIPITOR) 10 MG tablet      6. Allergic rhinitis, unspecified seasonality, unspecified trigger  J30.9 fluticasone (FLONASE) 50 MCG/ACT nasal spray      7. Essential hypertension  I10 metoprolol succinate ER (TOPROL XL) 50 MG 24 hr tablet     ramipril (ALTACE) 2.5 MG capsule      8. Atypical angina (H)  I20.8 nitroGLYcerin (NITROSTAT) 0.4 MG sublingual tablet      9. Lumbar foraminal stenosis  M48.061 ramipril (ALTACE) 2.5 MG capsule     Physical Therapy Referral      10. Lumbar facet arthropathy  M47.816 ramipril (ALTACE) 2.5 MG capsule     Physical Therapy Referral      11. " DDD (degenerative disc disease), lumbar  M51.36 ramipril (ALTACE) 2.5 MG capsule     Physical Therapy Referral      12. Pre-diabetes  R73.03 Hemoglobin A1c     Hemoglobin A1c      13. Mixed hyperlipidemia  E78.2       14. IPMN (intraductal papillary mucinous neoplasm)  D49.0       15. Colon cancer screening  Z12.11 COLOGUARD(EXACT SCIENCES)      16. Direct left inguinal hernia  K40.90                 COUNSELING:  Reviewed preventive health counseling, as reflected in patient instructions        He reports that he has never smoked. He has never used smokeless tobacco.      Appropriate preventive services were discussed with this patient, including applicable screening as appropriate for cardiovascular disease, diabetes, osteopenia/osteoporosis, and glaucoma.  As appropriate for age/gender, discussed screening for colorectal cancer, prostate cancer, breast cancer, and cervical cancer. Checklist reviewing preventive services available has been given to the patient.    Reviewed patients plan of care and provided an AVS. The Basic Care Plan (routine screening as documented in Health Maintenance) for Sandro meets the Care Plan requirement. This Care Plan has been established and reviewed with the Patient.          Guero Turner MD  [facility]    Identified Health Risks:    I have reviewed Opioid Use Disorder and Substance Use Disorder risk factors and made any needed referrals.

## 2023-05-25 NOTE — NURSING NOTE
"Chief Complaint   Patient presents with     Medicare Visit     annual         Initial /82   Pulse 76   Temp 97.3  F (36.3  C) (Tympanic)   Resp 16   Ht 1.727 m (5' 8\")   Wt 69.5 kg (153 lb 3.2 oz)   SpO2 97%   BMI 23.29 kg/m   Estimated body mass index is 23.29 kg/m  as calculated from the following:    Height as of this encounter: 1.727 m (5' 8\").    Weight as of this encounter: 69.5 kg (153 lb 3.2 oz).         Norma J. Gosselin, LPN   "

## 2023-06-25 ENCOUNTER — LAB (OUTPATIENT)
Dept: PEDIATRICS | Facility: OTHER | Age: 78
End: 2023-06-25
Payer: COMMERCIAL

## 2023-06-25 DIAGNOSIS — Z12.11 COLON CANCER SCREENING: ICD-10-CM

## 2023-07-08 DIAGNOSIS — E03.9 ACQUIRED HYPOTHYROIDISM: ICD-10-CM

## 2023-07-08 DIAGNOSIS — I10 ESSENTIAL HYPERTENSION: ICD-10-CM

## 2023-07-09 RX ORDER — METOPROLOL SUCCINATE 50 MG/1
TABLET, EXTENDED RELEASE ORAL
Qty: 90 TABLET | Refills: 3 | Status: SHIPPED | OUTPATIENT
Start: 2023-07-09 | End: 2024-05-31

## 2023-07-09 RX ORDER — LEVOTHYROXINE SODIUM 88 UG/1
TABLET ORAL
Qty: 90 TABLET | Refills: 3 | Status: SHIPPED | OUTPATIENT
Start: 2023-07-09 | End: 2024-05-31

## 2023-07-13 ENCOUNTER — THERAPY VISIT (OUTPATIENT)
Dept: PHYSICAL THERAPY | Facility: OTHER | Age: 78
End: 2023-07-13
Attending: INTERNAL MEDICINE
Payer: MEDICARE

## 2023-07-13 DIAGNOSIS — M47.816 LUMBAR FACET ARTHROPATHY: ICD-10-CM

## 2023-07-13 DIAGNOSIS — M51.369 DDD (DEGENERATIVE DISC DISEASE), LUMBAR: ICD-10-CM

## 2023-07-13 DIAGNOSIS — M48.061 LUMBAR FORAMINAL STENOSIS: ICD-10-CM

## 2023-07-13 PROCEDURE — 97140 MANUAL THERAPY 1/> REGIONS: CPT | Mod: GP | Performed by: PHYSICAL THERAPIST

## 2023-07-13 PROCEDURE — 97162 PT EVAL MOD COMPLEX 30 MIN: CPT | Mod: GP | Performed by: PHYSICAL THERAPIST

## 2023-07-13 NOTE — PROGRESS NOTES
PHYSICAL THERAPY EVALUATION  Type of Visit: Evaluation    See electronic medical record for Abuse and Falls Screening details.    Subjective      Presenting condition or subjective complaint: chronic back pain, hip/leg pain/radicular pain  Date of onset:  (5/25/23, MD referral/appointmetn.)    Relevant medical history:     Dates & types of surgery:      Prior diagnostic imaging/testing results:       Prior therapy history for the same diagnosis, illness or injury: Yes      Prior Level of Function   Transfers: Independent  Ambulation: Independent  ADL: Independent  IADL:     Living Environment  Social support: With a significant other or spouse   Type of home:     Stairs to enter the home:         Ramp: No   Stairs inside the home: No       Help at home:    Equipment owned:       Employment:      Hobbies/Interests: walking, attending sporting events.    Patient goals for therapy: walk, sit, sit/stand    Pain assessment: Pain present lumbar spine and L3/4 radiculr pattern today 3-7/10.      Objective   LUMBAR SPINE EVALUATION  PAIN: Pain Level with Use: 7/10  INTEGUMENTARY (edema, incisions): WNL  POSTURE: WNL, noted slight kyphotic posture, slight hip weakness during stance.   GAIT:   Weightbearing Status: WBAT  Assistive Device(s): None  Gait Deviations: WNL  BALANCE/PROPRIOCEPTION: WNL  WEIGHTBEARING ALIGNMENT: elevated left illiac crest compared to right.   NON-WEIGHTBEARING ALIGNMENT: anterior tilt Left, post tilt Right.   ROM: AROM WNL  PELVIC/SI SCREEN: see alignment  STRENGTH: WNL    MYOTOMES: WNL  DTR S: WNL  CORD SIGNS: WNL  DERMATOMES: WNL  NEURAL TENSION: Lumbar WNL  FLEXIBILITY: WNL  LUMBAR/HIP Special Tests: WNL   PALPATION: increased tone at right paraspinals  SPINAL SEGMENTAL CONCLUSIONS: FRS R L3      Assessment & Plan   CLINICAL IMPRESSIONS   Medical Diagnosis: Lumbar foraminal stenosis  Lumbar facet arthropathy    Treatment Diagnosis: lumbar radiculopathy, pelvic dysfunction, lumbar stenosis    Impression/Assessment: Patient is a 78 year old male with lumbar pain and radicular pains complaints.  The following significant findings have been identified: Pain. These impairments interfere with their ability to perform recreational activities, household chores, driving  and household mobility as compared to previous level of function.     Clinical Decision Making (Complexity):   Clinical Presentation: Stable/Uncomplicated  Clinical Presentation Rationale: based on medical and personal factors listed in PT evaluation  Clinical Decision Making (Complexity): Moderate complexity    PLAN OF CARE  Treatment Interventions:  Modalities: Cryotherapy, E-stim, Hot Pack, Mechanical Traction, Ultrasound  Interventions: Manual Therapy, Neuromuscular Re-education, Therapeutic Activity, Therapeutic Exercise    Long Term Goals     PT Goal 1  Goal Identifier: HEP  Goal Description: patient will maintain HEP at least 3x/wk to ipmrove pain and overall mobility  Target Date: 10/11/23  PT Goal 2  Goal Identifier: walk  Goal Description: patient will walk up to 8 blocks with no increaed pain  Target Date: 10/11/23  PT Goal 3  Goal Identifier: sit/stand  Goal Description: patient will have no more than 3/10 with transitional movements of in/out of bed or car.  Target Date: 10/11/23      Frequency of Treatment: 1-2x/wk  Duration of Treatment: 8-12 weeks      Risks and benefits of evaluation/treatment have been explained.   Patient/Family/caregiver agrees with Plan of Care.     Evaluation Time:     PT Eval, Moderate Complexity Minutes (11014): 30      Signing Clinician: Tayo Jimenez, PT      [facility]                                                                                   OUTPATIENT PHYSICAL THERAPY      PLAN OF TREATMENT FOR OUTPATIENT REHABILITATION   Patient's Last Name, First Name, DEQUANAUSTIN  Sandro Yoon YOB: 1945   Provider's Name   [facility]   Medical Record No.  [id number]     Onset Date:  (5/25/23, MD referral/appointmetn.)  Start of Care Date: 07/13/23     Medical Diagnosis:  Lumbar foraminal stenosis  Lumbar facet arthropathy      PT Treatment Diagnosis:  lumbar radiculopathy, pelvic dysfunction, lumbar stenosis Plan of Treatment  Frequency/Duration: 1-2x/wk/ 8-12 weeks    Certification date from 07/13/23 to 10/11/23         See note for plan of treatment details and functional goals     Tayo Jimenez, PT                         I CERTIFY THE NEED FOR THESE SERVICES FURNISHED UNDER        THIS PLAN OF TREATMENT AND WHILE UNDER MY CARE     (Physician attestation of this document indicates review and certification of the therapy plan).                  Referring Provider:  Guero Turner      Initial Assessment  See Epic Evaluation- Start of Care Date: 07/13/23

## 2023-07-13 NOTE — PROGRESS NOTES
07/13/23 0500   Appointment Info   Signing clinician's name / credentials Tayo Jimenez DPT   Total/Authorized Visits 1   Visits Used 1   Medical Diagnosis Lumbar foraminal stenosis  Lumbar facet arthropathy   PT Tx Diagnosis lumbar radiculopathy, pelvic dysfunction, lumbar stenosis   Quick Adds Certification   Progress Note/Certification   Start of Care Date 07/13/23   Onset of illness/injury or Date of Surgery   (5/25/23, MD referral/appointmetn.)   Therapy Frequency 1-2x/wk   Predicted Duration 8-12 weeks   Certification date from 07/13/23   Certification date to 10/11/23   GOALS   PT Goals 2;3   PT Goal 1   Goal Identifier HEP   Goal Description patient will maintain HEP at least 3x/wk to ipmrove pain and overall mobility   Target Date 10/11/23   PT Goal 2   Goal Identifier walk   Goal Description patient will walk up to 8 blocks with no increaed pain   Target Date 10/11/23   PT Goal 3   Goal Identifier sit/stand   Goal Description patient will have no more than 3/10 with transitional movements of in/out of bed or car.   Target Date 10/11/23   Subjective Report   Subjective Report see Eval   Treatment Interventions (PT)   Interventions Therapeutic Procedure/Exercise;Manual Therapy   Therapeutic Procedure/Exercise   Therapeutic Procedures: strength, endurance, ROM, flexibillity minutes (91141) 5   Ther Proc 1 - Details review of HEP, cues for technique, added of cat/cow and quadriped hip ext, PPT with leg raise/drop.   Manual Therapy   Manual Therapy: Mobilization, MFR, MLD, friction massage minutes (94884) 15   Manual Therapy 1 - Details seated MET L3 FRS, pubic shotgun, ant R,Post L MET in supine, prone STM/MFR.   Eval/Assessments   PT Eval, Moderate Complexity Minutes (89384) 30   Plan   Home program cat/cow, quad hip ext, PPT with leg lower   Plan for next session review HEP, MET as needed.   Total Session Time   Timed Code Treatment Minutes 20   Total Treatment Time (sum of timed and untimed services)  50         [facility]                                                                                   OUTPATIENT PHYSICAL THERAPY    PLAN OF TREATMENT FOR OUTPATIENT REHABILITATION   Patient's Last Name, First Name, Sandro Pittman YOB: 1945   Provider's Name   [facility]   Medical Record No.  [id number]     Onset Date:  (5/25/23, MD referral/appointmetn.)  Start of Care Date: 07/13/23     Medical Diagnosis:  Lumbar foraminal stenosis  Lumbar facet arthropathy      PT Treatment Diagnosis:  lumbar radiculopathy, pelvic dysfunction, lumbar stenosis Plan of Treatment  Frequency/Duration: 1-2x/wk/ 8-12 weeks    Certification date from 07/13/23 to 10/11/23         See note for plan of treatment details and functional goals     Tayo Jimenez, PT                         I CERTIFY THE NEED FOR THESE SERVICES FURNISHED UNDER        THIS PLAN OF TREATMENT AND WHILE UNDER MY CARE     (Physician attestation of this document indicates review and certification of the therapy plan).                Referring Provider:  Guero Turner      Initial Assessment  See Epic Evaluation- Start of Care Date: 07/13/23

## 2023-07-17 ENCOUNTER — THERAPY VISIT (OUTPATIENT)
Dept: PHYSICAL THERAPY | Facility: OTHER | Age: 78
End: 2023-07-17
Attending: INTERNAL MEDICINE
Payer: MEDICARE

## 2023-07-17 DIAGNOSIS — M47.816 LUMBAR FACET ARTHROPATHY: ICD-10-CM

## 2023-07-17 DIAGNOSIS — M51.369 DDD (DEGENERATIVE DISC DISEASE), LUMBAR: ICD-10-CM

## 2023-07-17 DIAGNOSIS — M48.061 LUMBAR FORAMINAL STENOSIS: Primary | ICD-10-CM

## 2023-07-17 DIAGNOSIS — I25.10 ASCVD (ARTERIOSCLEROTIC CARDIOVASCULAR DISEASE): ICD-10-CM

## 2023-07-17 PROCEDURE — 97140 MANUAL THERAPY 1/> REGIONS: CPT | Mod: GP,CQ

## 2023-07-17 PROCEDURE — 97110 THERAPEUTIC EXERCISES: CPT | Mod: GP,CQ

## 2023-07-19 RX ORDER — ATORVASTATIN CALCIUM 10 MG/1
TABLET, FILM COATED ORAL
Qty: 90 TABLET | Refills: 3 | Status: SHIPPED | OUTPATIENT
Start: 2023-07-19 | End: 2023-09-11

## 2023-07-20 ENCOUNTER — THERAPY VISIT (OUTPATIENT)
Dept: PHYSICAL THERAPY | Facility: OTHER | Age: 78
End: 2023-07-20
Attending: INTERNAL MEDICINE
Payer: MEDICARE

## 2023-07-20 DIAGNOSIS — M47.816 LUMBAR FACET ARTHROPATHY: ICD-10-CM

## 2023-07-20 DIAGNOSIS — M54.16 LUMBAR RADICULOPATHY: ICD-10-CM

## 2023-07-20 DIAGNOSIS — M51.369 DDD (DEGENERATIVE DISC DISEASE), LUMBAR: ICD-10-CM

## 2023-07-20 DIAGNOSIS — M48.061 LUMBAR FORAMINAL STENOSIS: Primary | ICD-10-CM

## 2023-07-20 PROCEDURE — 97140 MANUAL THERAPY 1/> REGIONS: CPT | Mod: GP | Performed by: PHYSICAL THERAPIST

## 2023-07-20 PROCEDURE — 97110 THERAPEUTIC EXERCISES: CPT | Mod: GP | Performed by: PHYSICAL THERAPIST

## 2023-07-26 ENCOUNTER — THERAPY VISIT (OUTPATIENT)
Dept: PHYSICAL THERAPY | Facility: OTHER | Age: 78
End: 2023-07-26
Attending: INTERNAL MEDICINE
Payer: MEDICARE

## 2023-07-26 DIAGNOSIS — M48.061 LUMBAR FORAMINAL STENOSIS: Primary | ICD-10-CM

## 2023-07-26 PROCEDURE — 97110 THERAPEUTIC EXERCISES: CPT | Mod: GP | Performed by: PHYSICAL THERAPIST

## 2023-07-26 PROCEDURE — 97140 MANUAL THERAPY 1/> REGIONS: CPT | Mod: GP | Performed by: PHYSICAL THERAPIST

## 2023-07-28 ENCOUNTER — THERAPY VISIT (OUTPATIENT)
Dept: PHYSICAL THERAPY | Facility: OTHER | Age: 78
End: 2023-07-28
Attending: INTERNAL MEDICINE
Payer: MEDICARE

## 2023-07-28 DIAGNOSIS — M47.816 LUMBAR FACET ARTHROPATHY: ICD-10-CM

## 2023-07-28 DIAGNOSIS — M54.16 LUMBAR RADICULOPATHY: ICD-10-CM

## 2023-07-28 DIAGNOSIS — M51.369 DDD (DEGENERATIVE DISC DISEASE), LUMBAR: ICD-10-CM

## 2023-07-28 DIAGNOSIS — M48.061 LUMBAR FORAMINAL STENOSIS: Primary | ICD-10-CM

## 2023-07-28 PROCEDURE — 97140 MANUAL THERAPY 1/> REGIONS: CPT | Mod: GP | Performed by: PHYSICAL THERAPIST

## 2023-07-28 PROCEDURE — 97110 THERAPEUTIC EXERCISES: CPT | Mod: GP | Performed by: PHYSICAL THERAPIST

## 2023-08-01 DIAGNOSIS — M47.816 LUMBAR FACET ARTHROPATHY: ICD-10-CM

## 2023-08-01 DIAGNOSIS — I10 ESSENTIAL HYPERTENSION: ICD-10-CM

## 2023-08-01 DIAGNOSIS — M51.369 DDD (DEGENERATIVE DISC DISEASE), LUMBAR: ICD-10-CM

## 2023-08-01 DIAGNOSIS — M48.061 LUMBAR FORAMINAL STENOSIS: ICD-10-CM

## 2023-08-02 ENCOUNTER — THERAPY VISIT (OUTPATIENT)
Dept: PHYSICAL THERAPY | Facility: OTHER | Age: 78
End: 2023-08-02
Attending: INTERNAL MEDICINE
Payer: MEDICARE

## 2023-08-02 DIAGNOSIS — M51.369 DDD (DEGENERATIVE DISC DISEASE), LUMBAR: ICD-10-CM

## 2023-08-02 DIAGNOSIS — M47.816 LUMBAR FACET ARTHROPATHY: ICD-10-CM

## 2023-08-02 DIAGNOSIS — M54.16 LUMBAR RADICULOPATHY: ICD-10-CM

## 2023-08-02 DIAGNOSIS — M48.061 LUMBAR FORAMINAL STENOSIS: Primary | ICD-10-CM

## 2023-08-02 PROCEDURE — 97110 THERAPEUTIC EXERCISES: CPT | Mod: GP | Performed by: PHYSICAL THERAPIST

## 2023-08-02 PROCEDURE — 97140 MANUAL THERAPY 1/> REGIONS: CPT | Mod: GP | Performed by: PHYSICAL THERAPIST

## 2023-08-02 RX ORDER — RAMIPRIL 2.5 MG/1
CAPSULE ORAL
Qty: 90 CAPSULE | Refills: 3 | OUTPATIENT
Start: 2023-08-02

## 2023-08-02 NOTE — TELEPHONE ENCOUNTER
[facility]vin sent Rx request for the following:    RAMIPRIL 2.5MG CAPSULES   Last Prescription Date:   5/25/23  Last Fill Qty/Refills:         90, R-3    Last Office Visit:              5/25/23   Future Office visit:           none   Redundant refill request refused: Too soon:  Christianne Dsouza RN on 8/2/2023 at 2:44 PM

## 2023-08-03 ENCOUNTER — TRANSFERRED RECORDS (OUTPATIENT)
Dept: HEALTH INFORMATION MANAGEMENT | Facility: OTHER | Age: 78
End: 2023-08-03
Payer: COMMERCIAL

## 2023-08-04 ENCOUNTER — THERAPY VISIT (OUTPATIENT)
Dept: PHYSICAL THERAPY | Facility: OTHER | Age: 78
End: 2023-08-04
Attending: INTERNAL MEDICINE
Payer: MEDICARE

## 2023-08-04 DIAGNOSIS — M51.369 DDD (DEGENERATIVE DISC DISEASE), LUMBAR: ICD-10-CM

## 2023-08-04 DIAGNOSIS — M48.061 LUMBAR FORAMINAL STENOSIS: Primary | ICD-10-CM

## 2023-08-04 DIAGNOSIS — M54.16 LUMBAR RADICULOPATHY: ICD-10-CM

## 2023-08-04 PROCEDURE — 97140 MANUAL THERAPY 1/> REGIONS: CPT | Mod: GP | Performed by: PHYSICAL THERAPIST

## 2023-08-04 PROCEDURE — 97110 THERAPEUTIC EXERCISES: CPT | Mod: GP | Performed by: PHYSICAL THERAPIST

## 2023-08-09 ENCOUNTER — THERAPY VISIT (OUTPATIENT)
Dept: PHYSICAL THERAPY | Facility: OTHER | Age: 78
End: 2023-08-09
Attending: INTERNAL MEDICINE
Payer: MEDICARE

## 2023-08-09 DIAGNOSIS — M47.816 LUMBAR FACET ARTHROPATHY: ICD-10-CM

## 2023-08-09 DIAGNOSIS — M51.369 DDD (DEGENERATIVE DISC DISEASE), LUMBAR: ICD-10-CM

## 2023-08-09 DIAGNOSIS — M54.16 LUMBAR RADICULOPATHY: ICD-10-CM

## 2023-08-09 DIAGNOSIS — M48.061 LUMBAR FORAMINAL STENOSIS: Primary | ICD-10-CM

## 2023-08-09 PROCEDURE — 97140 MANUAL THERAPY 1/> REGIONS: CPT | Mod: GP | Performed by: PHYSICAL THERAPIST

## 2023-08-09 PROCEDURE — 97110 THERAPEUTIC EXERCISES: CPT | Mod: GP | Performed by: PHYSICAL THERAPIST

## 2023-09-05 ENCOUNTER — HOSPITAL ENCOUNTER (EMERGENCY)
Facility: OTHER | Age: 78
Discharge: HOME | End: 2023-09-05
Attending: FAMILY MEDICINE | Admitting: FAMILY MEDICINE
Payer: COMMERCIAL

## 2023-09-05 ENCOUNTER — TELEPHONE (OUTPATIENT)
Dept: PEDIATRICS | Facility: OTHER | Age: 78
End: 2023-09-05
Payer: COMMERCIAL

## 2023-09-05 VITALS
HEIGHT: 68 IN | BODY MASS INDEX: 23.19 KG/M2 | SYSTOLIC BLOOD PRESSURE: 124 MMHG | HEART RATE: 70 BPM | DIASTOLIC BLOOD PRESSURE: 85 MMHG | RESPIRATION RATE: 16 BRPM | WEIGHT: 153 LBS | OXYGEN SATURATION: 95 % | TEMPERATURE: 98 F

## 2023-09-05 DIAGNOSIS — B02.7 DISSEMINATED HERPES ZOSTER: ICD-10-CM

## 2023-09-05 PROCEDURE — 99282 EMERGENCY DEPT VISIT SF MDM: CPT | Performed by: FAMILY MEDICINE

## 2023-09-05 PROCEDURE — 99284 EMERGENCY DEPT VISIT MOD MDM: CPT | Performed by: FAMILY MEDICINE

## 2023-09-05 NOTE — ED PROVIDER NOTES
History     Chief Complaint   Patient presents with    Syncope     The history is provided by the patient, the spouse and medical records.     Sandro Yoon is a 78 year old male who had a syncopal episode on Friday, four days ago.  He tells me that he is here for his skin rash. It is painful and located on the left side of his chest wall. He had a follow up appointment at [facility] on July 10 and noticed this rash after that. The rash started near his left nipple and spread towards the center of his chest initially. He was seen again and started on fluconazole and hydrocortisone cream after a skin scraping was negative for Varicella. Since starting the cream the rash has continued to spread.     He did have a syncopal episode on Friday, four days ago, but has normal blood pressure here.  He and his wife wonder if their blood pressure cuff is not working correctly.      Allergies:  Allergies   Allergen Reactions    Co Q 10 [Coenzyme Q10] Other (See Comments)     Encephalopathy, transaminitis.    Hydrocodone-Acetaminophen Nausea and Vomiting    Simvastatin Rash    Thimerosal Swelling    Diatrizoate Rash    Plavix [Clopidogrel] Rash       Problem List:    Patient Active Problem List    Diagnosis Date Noted    Lumbar foraminal stenosis 05/25/2023     Priority: Medium    Lumbar facet arthropathy 05/25/2023     Priority: Medium    DDD (degenerative disc disease), lumbar 05/25/2023     Priority: Medium    Pre-diabetes 05/25/2023     Priority: Medium    Direct left inguinal hernia 05/25/2023     Priority: Medium    Venous stasis of both lower extremities 05/05/2022     Priority: Medium    Stage 3a chronic kidney disease (H) 09/23/2021     Priority: Medium    IPMN (intraductal papillary mucinous neoplasm) 05/30/2019     Priority: Medium     Largest 6 mm, plan MRCP in 2021. Follows with [facility]  Next MRCP 2024      H/O cardiac catheterization on 8/14/18 with ×2 stents to his LAD through [facility] in [place], [place] 08/30/2018     Priority: Medium    Arteriovenous fistula, acquired (H) 08/30/2018     Priority: Medium    Acquired hypothyroidism 08/23/2018     Priority: Medium    Coronary artery disease of native artery of native heart with stable angina pectoris (H) 07/31/2018     Priority: Medium    Hx of cardiac cath on 6/16/2005 and 9/3/2003 at [facility] with stenting to the LAD 07/31/2018     Priority: Medium    History of coronary artery stent placement to his LAD on 9/3/2003 and 6/16/2005 07/31/2018     Priority: Medium    Diverticular disease of colon 01/29/2018     Priority: Medium    Mixed hyperlipidemia 01/29/2018     Priority: Medium    Allergic rhinitis 07/26/2017     Priority: Medium    ASCVD (arteriosclerotic cardiovascular disease) 07/26/2017     Priority: Medium    Benign microscopic hematuria 07/26/2017     Priority: Medium     Overview:   s/p workup with Mcginnis, negative.      Essential hypertension 11/12/2015     Priority: Medium    S/P angioplasty with stent 11/12/2015     Priority: Medium    Peyronie's disease 10/26/2015     Priority: Medium    Actinic keratoses 12/09/2013     Priority: Medium        Past Medical History:    Past Medical History:   Diagnosis Date    Coronary artery disease     Hypertension     Hypothyroid     Stented coronary artery     Transient global amnesia        Past Surgical History:    Past Surgical History:   Procedure Laterality Date    APPENDECTOMY OPEN      COLONOSCOPY  04/20/2009 2009,Due 2019    CYSTOSCOPY, TRANSURETHRAL RESECTION (TUR) TUMOR BLADDER, COMBINED  2015    ENDOSCOPIC RETROGRADE CHOLANGIOPANCREATOGRAM N/A 9/23/2019    Procedure: ENDOSCOPIC RETROGRADE CHOLANGIOPANCREATOGRAPHY, WITH TUBE OR STENT INSERTION, spinchterotomy, balloon sweep of bile duct and stent placement;  Surgeon: Evan Traore MD;  Location: [facility] OR    ENDOSCOPIC ULTRASOUND UPPER GASTROINTESTINAL TRACT (GI) N/A 5/13/2019    Procedure: Diagnostic Upper Endoscopic  Ultrasound;  Surgeon: Evan Traore MD;  Location: [facility] OR    ESOPHAGOSCOPY, GASTROSCOPY, DUODENOSCOPY (EGD), COMBINED N/A 2019    Procedure: Esophagogastroduodenoscopy, With Fine Needle Aspiration Biopsy, With Endoscopic Ultrasound Guidance;  Surgeon: Evan Traore MD;  Location: [facility] OR    EXTRACAPSULAR CATARACT EXTRATION WITH INTRAOCULAR LENS IMPLANT Bilateral     HERNIORRHAPHY INGUINAL Right 2019    Procedure: Repair Right Inguinal Hernia w/ Mesh;  Surgeon: Aurora Verdin MD;  Location: [facility] OR    LAPAROSCOPIC CHOLECYSTECTOMY      ,with cholangiogram    SIGMOIDOSCOPY FLEXIBLE      1998    STENT      90% LAD & 99% SECOND DIAGONAL    STENT      LAD    STENT      LAD times 2    TONSILLECTOMY      No Comments Provided       Family History:    Family History   Problem Relation Age of Onset    Heart Disease Mother          at 94    Heart Disease Father     Prostate Cancer Father 80    Other - See Comments Brother         Sudden cardiac death    Heart Disease Brother     Prostate Cancer Brother 40    Other - See Comments Brother         CABG sdc young age    Prostate Cancer Brother 65    Colon Cancer No family hx of         Cancer-colon    Anesthesia Reaction No family hx of         Anesthesia Problem    Blood Disease No family hx of         Blood Disease,no bleeding or clotting       Social History:  Marital Status:   [2]  Social History     Tobacco Use    Smoking status: Never    Smokeless tobacco: Never   Vaping Use    Vaping Use: Never used   Substance Use Topics    Alcohol use: Not Currently     Comment: zero use since 2018    Drug use: No        Medications:    acetaminophen (TYLENOL) 500 MG tablet  aspirin (ASA) 81 MG chewable tablet  atorvastatin (LIPITOR) 10 MG tablet  BRILINTA 90 MG tablet  diclofenac (VOLTAREN) 1 % topical gel  fluorouracil (EFUDEX) 5 % external cream  fluticasone (FLONASE) 50 MCG/ACT nasal spray  levothyroxine (SYNTHROID/LEVOTHROID) 88 MCG  "tablet  loratadine (CLARITIN) 10 MG tablet  metoprolol succinate ER (TOPROL XL) 50 MG 24 hr tablet  nitroGLYcerin (NITROSTAT) 0.4 MG sublingual tablet  ramipril (ALTACE) 2.5 MG capsule      Review of Systems   Skin:  Positive for rash.   All other systems reviewed and are negative.      Physical Exam   BP: (!) 147/85  Pulse: 82  Temp: 98  F (36.7  C)  Resp: 16  Height: 172.7 cm (5' 8\")  Weight: 69.4 kg (153 lb)  SpO2: 99 %      Physical Exam  Vitals and nursing note reviewed.   Constitutional:       General: He is not in acute distress.     Appearance: Normal appearance. He is not ill-appearing, toxic-appearing or diaphoretic.   Skin:     General: Skin is warm and dry.      Comments: This rash is erythematous with macules, papules, vesicles. I do not see pustules or dried lesions.  There are a few (five, maybe six) erythematous papules to the right of midline on his chest still over the T4 or T5 dermatome. The main rash is over the T4 or T5 dermatome on the left side.  He has rash and tenderness of the anterior portion and numbness and tingling over the posterior portion of the left T4 dermatome.    Neurological:      Mental Status: He is alert.         Assessments & Plan (with Medical Decision Making)  Sandro Yoon is a 78 year old male who had a syncopal episode on Friday, four days ago.  He tells me that he is here for his skin rash. It is painful and located on the left side of his chest wall. He had a follow up appointment at [facility] on July 10 and noticed this rash after that. The rash started near his left nipple and spread towards the center of his chest initially. He was seen again and started on fluconazole and hydrocortisone cream after a skin scraping was negative for Varicella. Since starting the cream the rash has continued to spread.  He did have a syncopal episode on Friday, four days ago, but has normal blood pressure here.  He and his wife wonder if their blood pressure " "cuff is not working correctly.  VS in the ED /80   Pulse 79   Temp 98  F (36.7  C) (Tympanic)   Resp 16   Ht 1.727 m (5' 8\")   Wt 69.4 kg (153 lb)   SpO2 94%   BMI 23.26 kg/m    Exam shows skin rash consistent with shingles, with some abnormality of presentation (crosses midline, includes more than one dermatome).  I did review this component on Up To Date and they acknowledge that variation occurs due to viremia early on in disease course.  I recommend that they complete the Valtrex and we did talk about three groups of people to avoid.      I have reviewed the nursing notes.    I have reviewed the findings, diagnosis, plan and need for follow up with the patient.     Medical Decision Making  The patient's presentation was of moderate complexity (an acute illness with systemic symptoms).    The patient's evaluation involved:  an assessment requiring an independent historian (see separate area of note for details)    The patient's management necessitated moderate risk (prescription drug management including medications given in the ED).    Final diagnoses:   Disseminated herpes zoster - left T4 dermatome       9/5/2023   [facility] AND [facility], José Luis Bagley MD  09/05/23 1416    "

## 2023-09-05 NOTE — ED TRIAGE NOTES
Pt here with wife, pt reports that he had a syncopal episode on Friday, pt reports that he has been having low BPs for a long time, pt was told to come to Er to be evaluated, pt also wants a rash looked at that has been going on and getting worse for about a week, VSS, pt into [facility], pt is asymptomatic at this time for any syncope type symptoms     Triage Assessment       Row Name 09/05/23 1322       Triage Assessment (Adult)    Airway WDL WDL       Respiratory WDL    Respiratory WDL WDL       Skin Circulation/Temperature WDL    Skin Circulation/Temperature WDL WDL       Cardiac WDL    Cardiac WDL WDL       Peripheral/Neurovascular WDL    Peripheral Neurovascular WDL WDL       Cognitive/Neuro/Behavioral WDL    Cognitive/Neuro/Behavioral WDL WDL

## 2023-09-05 NOTE — DISCHARGE INSTRUCTIONS
Regarding shingles:    Avoid three groups of people as long as you have the rash:  1. Newborns  2. Pregnant ladies  3. People on chemotherapy or with a depressed immune system    Come to clinic as soon as you get the rash so that we can give you medicine that might help with the rash    Thank you for choosing our Emergency Department for your care.     You may receive a phone call or letter for a survey about your care in our ED.  Please complete this as this is how we improve care for our patients.     If you have any questions after leaving the ED you can call or text me on my cell phone at [phone] and I will get back to you at some point. This does not mean that I am on call and if you are not doing well please return to the ED.     Sincerely,    Dr Silvio Toledo M.D.

## 2023-09-05 NOTE — TELEPHONE ENCOUNTER
Called and spoke to Patient's wife after verifying last name and date of birth. Patient's wife states that the patient has been having low blood pressures since Friday 9/1/23 @ 96/68, 9/2 @ 93/72, 9/4 @ 94/68. Patient was experiencing some dizziness, sweating, passed out on Friday while sitting in the kitchen, patient denies needing an ambulance. Patient's wife states the patient has not experienced any other episodes of dizziness since this incident.Patient is taking their metoprolol and Ramipril daily. Denies any headaches, chest pain, shortness of breathe. Will route to PCP for review.       Patient's wife states that if patient needs to come in to be seen, to call the wife because she will be able to get hold of the patient since they do not have their cell phone on them.       Amanda Rocha RN on 9/5/2023 at 10:19 AM

## 2023-09-05 NOTE — TELEPHONE ENCOUNTER
Called and spoke to Patient's wife after verifying last name and date of birth. Relayed message from PCP verbalizing understanding and agreed to bring patient to the ED to be evaluated for hypotension.      Amanda Rocha RN on 9/5/2023 at 11:39 AM

## 2023-09-11 ENCOUNTER — OFFICE VISIT (OUTPATIENT)
Dept: PEDIATRICS | Facility: OTHER | Age: 78
End: 2023-09-11
Attending: INTERNAL MEDICINE
Payer: MEDICARE

## 2023-09-11 VITALS
HEIGHT: 68 IN | BODY MASS INDEX: 22.94 KG/M2 | RESPIRATION RATE: 16 BRPM | SYSTOLIC BLOOD PRESSURE: 122 MMHG | HEART RATE: 74 BPM | OXYGEN SATURATION: 98 % | WEIGHT: 151.4 LBS | DIASTOLIC BLOOD PRESSURE: 80 MMHG | TEMPERATURE: 97.5 F

## 2023-09-11 DIAGNOSIS — D75.89 MACROCYTOSIS: ICD-10-CM

## 2023-09-11 DIAGNOSIS — I95.89 OTHER SPECIFIED HYPOTENSION: ICD-10-CM

## 2023-09-11 DIAGNOSIS — R55 SYNCOPE, UNSPECIFIED SYNCOPE TYPE: ICD-10-CM

## 2023-09-11 DIAGNOSIS — R21 RASH AND NONSPECIFIC SKIN ERUPTION: Primary | ICD-10-CM

## 2023-09-11 LAB
BASOPHILS # BLD AUTO: 0 10E3/UL (ref 0–0.2)
BASOPHILS NFR BLD AUTO: 0 %
EOSINOPHIL # BLD AUTO: 0.7 10E3/UL (ref 0–0.7)
EOSINOPHIL NFR BLD AUTO: 10 %
ERYTHROCYTE [DISTWIDTH] IN BLOOD BY AUTOMATED COUNT: 13.3 % (ref 10–15)
HCT VFR BLD AUTO: 44.5 % (ref 40–53)
HGB BLD-MCNC: 14.6 G/DL (ref 13.3–17.7)
IMM GRANULOCYTES # BLD: 0 10E3/UL
IMM GRANULOCYTES NFR BLD: 0 %
LYMPHOCYTES # BLD AUTO: 2 10E3/UL (ref 0.8–5.3)
LYMPHOCYTES NFR BLD AUTO: 28 %
MCH RBC QN AUTO: 33.4 PG (ref 26.5–33)
MCHC RBC AUTO-ENTMCNC: 32.8 G/DL (ref 31.5–36.5)
MCV RBC AUTO: 102 FL (ref 78–100)
MONOCYTES # BLD AUTO: 0.7 10E3/UL (ref 0–1.3)
MONOCYTES NFR BLD AUTO: 10 %
NEUTROPHILS # BLD AUTO: 3.7 10E3/UL (ref 1.6–8.3)
NEUTROPHILS NFR BLD AUTO: 52 %
NRBC # BLD AUTO: 0 10E3/UL
NRBC BLD AUTO-RTO: 0 /100
PLATELET # BLD AUTO: 251 10E3/UL (ref 150–450)
RBC # BLD AUTO: 4.37 10E6/UL (ref 4.4–5.9)
VIT B12 SERPL-MCNC: 744 PG/ML (ref 232–1245)
WBC # BLD AUTO: 7.2 10E3/UL (ref 4–11)

## 2023-09-11 PROCEDURE — 99214 OFFICE O/P EST MOD 30 MIN: CPT | Performed by: INTERNAL MEDICINE

## 2023-09-11 PROCEDURE — 86753 PROTOZOA ANTIBODY NOS: CPT | Mod: ZL | Performed by: INTERNAL MEDICINE

## 2023-09-11 PROCEDURE — 82607 VITAMIN B-12: CPT | Mod: ZL | Performed by: INTERNAL MEDICINE

## 2023-09-11 PROCEDURE — G0463 HOSPITAL OUTPT CLINIC VISIT: HCPCS

## 2023-09-11 PROCEDURE — 36415 COLL VENOUS BLD VENIPUNCTURE: CPT | Mod: ZL | Performed by: INTERNAL MEDICINE

## 2023-09-11 PROCEDURE — 85014 HEMATOCRIT: CPT | Mod: ZL | Performed by: INTERNAL MEDICINE

## 2023-09-11 PROCEDURE — 86666 EHRLICHIA ANTIBODY: CPT | Mod: ZL | Performed by: INTERNAL MEDICINE

## 2023-09-11 PROCEDURE — 86618 LYME DISEASE ANTIBODY: CPT | Mod: ZL | Performed by: INTERNAL MEDICINE

## 2023-09-11 ASSESSMENT — PAIN SCALES - GENERAL: PAINLEVEL: MILD PAIN (3)

## 2023-09-11 NOTE — PATIENT INSTRUCTIONS
-- Stop atorvastatin   -- Topical steroid cream   -- Tick testing today   -- Return to Derm if not improving

## 2023-09-11 NOTE — NURSING NOTE
"Chief Complaint   Patient presents with    RECHECK     ER visit 9/5/23-  HTN and Shingles         Initial /80   Pulse 74   Temp 97.5  F (36.4  C) (Tympanic)   Resp 16   Ht 1.727 m (5' 8\")   Wt 68.7 kg (151 lb 6.4 oz)   SpO2 98%   BMI 23.02 kg/m   Estimated body mass index is 23.02 kg/m  as calculated from the following:    Height as of this encounter: 1.727 m (5' 8\").    Weight as of this encounter: 68.7 kg (151 lb 6.4 oz).         Norma J. Gosselin, LPN     "

## 2023-09-11 NOTE — PROGRESS NOTES
Assessment & Plan       ICD-10-CM    1. Rash and nonspecific skin eruption  R21 Lyme Disease Total Abs Bld with Reflex to Confirm CLIA     Anaplasma phagocytoph Antibodies IgG IgM     Babesia antibody IgG IgM     CBC with Platelets & Differential     CBC with Platelets & Differential     Babesia antibody IgG IgM     Anaplasma phagocytoph Antibodies IgG IgM     Lyme Disease Total Abs Bld with Reflex to Confirm CLIA      2. Syncope, unspecified syncope type  R55 Lyme Disease Total Abs Bld with Reflex to Confirm CLIA     Anaplasma phagocytoph Antibodies IgG IgM     Babesia antibody IgG IgM     CBC with Platelets & Differential     CBC with Platelets & Differential     Babesia antibody IgG IgM     Anaplasma phagocytoph Antibodies IgG IgM     Lyme Disease Total Abs Bld with Reflex to Confirm CLIA      3. Other specified hypotension  I95.89 Lyme Disease Total Abs Bld with Reflex to Confirm CLIA     Anaplasma phagocytoph Antibodies IgG IgM     Babesia antibody IgG IgM     CBC with Platelets & Differential     CBC with Platelets & Differential     Babesia antibody IgG IgM     Anaplasma phagocytoph Antibodies IgG IgM     Lyme Disease Total Abs Bld with Reflex to Confirm CLIA      4. Macrocytosis  D75.89 Vitamin B12     Vitamin B12        This does not appear to be shingles.  Differential diagnosis includes contact dermatitis, atopic dermatitis, adverse medication reaction, pityriasis rosea, tickborne illness, tinea corporis, others.  I wondered if he also could be possibly allergic to the topical lidocaine ointment and recommended he try putting up test patch on another area of skin.  We discussed using one-way route of medication administration so as not to contaminate the topical medicine with a skin fungus or bacteria.  Ultimately topical cortisone cream and monitoring is what is recommended.  He does have an old tube of betamethasone at home which she wants to try and use.  Warning signs discussed return if  "concerns.    Patient Instructions    -- Stop atorvastatin   -- Topical steroid cream   -- Tick testing today   -- Return to Derm if not improving          Return if symptoms worsen or fail to improve.    Signed, Guero Turner MD, FAAP, FACP  Internal Medicine & Pediatrics    Subjective   Sandro Yoon is a 78 year old male who presents for follow-up ER.  He recently contacted our clinic because his blood pressure was very low.  He came to the ER and his blood pressure was normal when he arrived however he had a rash that he had evaluated.  He also had a brief syncopal episode prior to coming to the ER.  This occurred after standing at the kitchen sink for several hours.  The rash on his chest started near the left nipple.  He saw the dermatologist around that time.  He was given an antifungal cream to try.  When he came to the ER he was diagnosed with shingles and given a course of valacyclovir.  His rash has progressed some.  It is quite itchy.  He is using topical lidocaine.  Found a couple of small insects on his soap dish.    Objective   Vitals: /80   Pulse 74   Temp 97.5  F (36.4  C) (Tympanic)   Resp 16   Ht 1.727 m (5' 8\")   Wt 68.7 kg (151 lb 6.4 oz)   SpO2 98%   BMI 23.02 kg/m      Skin: Diffuse scattered papular rash on the anterior chest crossing the midline as seen in the photo below.        Answers submitted by the patient for this visit:  General Questionnaire (Submitted on 9/11/2023)  Chief Complaint: Chronic problems general questions HPI Form  What is the reason for your visit today? : blood pressure check plus shingles  How many servings of fruits and vegetables do you eat daily?: 2-3  On average, how many sweetened beverages do you drink each day (Examples: soda, juice, sweet tea, etc.  Do NOT count diet or artificially sweetened beverages)?: 1  How many minutes a day do you exercise enough to make your heart beat faster?: 20 to 29  How many days a week do you exercise enough " to make your heart beat faster?: 4  How many days per week do you miss taking your medication?: 0

## 2023-09-13 LAB — B BURGDOR IGG+IGM SER QL: 0.06

## 2023-09-14 LAB
A PHAGOCYTOPH IGG TITR SER IF: NORMAL {TITER}
A PHAGOCYTOPH IGM TITR SER IF: NORMAL {TITER}
B MICROTI IGG TITR SER: NORMAL {TITER}
B MICROTI IGM TITR SER: NORMAL {TITER}

## 2023-09-15 ENCOUNTER — MYC MEDICAL ADVICE (OUTPATIENT)
Dept: PEDIATRICS | Facility: OTHER | Age: 78
End: 2023-09-15
Payer: COMMERCIAL

## 2023-09-15 DIAGNOSIS — R21 RASH AND NONSPECIFIC SKIN ERUPTION: Primary | ICD-10-CM

## 2023-09-15 RX ORDER — PREDNISONE 20 MG/1
20 TABLET ORAL DAILY
Qty: 5 TABLET | Refills: 0 | Status: SHIPPED | OUTPATIENT
Start: 2023-09-15 | End: 2024-05-31

## 2023-11-06 ENCOUNTER — TRANSFERRED RECORDS (OUTPATIENT)
Dept: HEALTH INFORMATION MANAGEMENT | Facility: CLINIC | Age: 78
End: 2023-11-06
Payer: COMMERCIAL

## 2023-11-07 LAB — NONINV COLON CA DNA+OCC BLD SCRN STL QL: NEGATIVE

## 2023-11-09 ENCOUNTER — MEDICAL CORRESPONDENCE (OUTPATIENT)
Dept: HEALTH INFORMATION MANAGEMENT | Facility: CLINIC | Age: 78
End: 2023-11-09
Payer: COMMERCIAL

## 2023-11-10 ENCOUNTER — TELEPHONE (OUTPATIENT)
Dept: OTOLARYNGOLOGY | Facility: OTHER | Age: 78
End: 2023-11-10

## 2023-11-10 NOTE — CONFIDENTIAL NOTE
November 10, 2023    Referral received from [facility]. Left message to schedule with ENT/Allergy Dept.    -Alee Boss HUC

## 2023-12-22 ENCOUNTER — OFFICE VISIT (OUTPATIENT)
Dept: INTERNAL MEDICINE | Facility: OTHER | Age: 78
End: 2023-12-22
Payer: MEDICARE

## 2023-12-22 VITALS
SYSTOLIC BLOOD PRESSURE: 122 MMHG | BODY MASS INDEX: 22.52 KG/M2 | RESPIRATION RATE: 16 BRPM | DIASTOLIC BLOOD PRESSURE: 70 MMHG | OXYGEN SATURATION: 99 % | TEMPERATURE: 97.1 F | HEART RATE: 82 BPM | HEIGHT: 68 IN | WEIGHT: 148.6 LBS

## 2023-12-22 DIAGNOSIS — Z23 NEED FOR COVID-19 VACCINE: ICD-10-CM

## 2023-12-22 DIAGNOSIS — L30.9 DERMATITIS: Primary | ICD-10-CM

## 2023-12-22 DIAGNOSIS — R73.03 PRE-DIABETES: ICD-10-CM

## 2023-12-22 DIAGNOSIS — E78.2 MIXED HYPERLIPIDEMIA: ICD-10-CM

## 2023-12-22 LAB
ALBUMIN SERPL BCG-MCNC: 4.2 G/DL (ref 3.5–5.2)
ALP SERPL-CCNC: 92 U/L (ref 40–150)
ALT SERPL W P-5'-P-CCNC: 33 U/L (ref 0–70)
ANION GAP SERPL CALCULATED.3IONS-SCNC: 11 MMOL/L (ref 7–15)
AST SERPL W P-5'-P-CCNC: 26 U/L (ref 0–45)
BILIRUB SERPL-MCNC: 1.8 MG/DL
BUN SERPL-MCNC: 30.9 MG/DL (ref 8–23)
CALCIUM SERPL-MCNC: 9.9 MG/DL (ref 8.8–10.2)
CHLORIDE SERPL-SCNC: 105 MMOL/L (ref 98–107)
CHOLEST SERPL-MCNC: 195 MG/DL
CREAT SERPL-MCNC: 1.34 MG/DL (ref 0.67–1.17)
DEPRECATED HCO3 PLAS-SCNC: 25 MMOL/L (ref 22–29)
EGFRCR SERPLBLD CKD-EPI 2021: 54 ML/MIN/1.73M2
FASTING STATUS PATIENT QL REPORTED: YES
GLUCOSE SERPL-MCNC: 104 MG/DL (ref 70–99)
HBA1C MFR BLD: 5.8 % (ref 4–6.2)
HDLC SERPL-MCNC: 57 MG/DL
LDLC SERPL CALC-MCNC: 115 MG/DL
NONHDLC SERPL-MCNC: 138 MG/DL
POTASSIUM SERPL-SCNC: 4.1 MMOL/L (ref 3.4–5.3)
PROT SERPL-MCNC: 7.2 G/DL (ref 6.4–8.3)
SODIUM SERPL-SCNC: 141 MMOL/L (ref 135–145)
TRIGL SERPL-MCNC: 117 MG/DL

## 2023-12-22 PROCEDURE — 80053 COMPREHEN METABOLIC PANEL: CPT | Mod: ZL

## 2023-12-22 PROCEDURE — 83036 HEMOGLOBIN GLYCOSYLATED A1C: CPT | Mod: ZL

## 2023-12-22 PROCEDURE — 91320 SARSCV2 VAC 30MCG TRS-SUC IM: CPT

## 2023-12-22 PROCEDURE — 80061 LIPID PANEL: CPT | Mod: ZL

## 2023-12-22 PROCEDURE — 36415 COLL VENOUS BLD VENIPUNCTURE: CPT | Mod: ZL

## 2023-12-22 PROCEDURE — G0463 HOSPITAL OUTPT CLINIC VISIT: HCPCS | Mod: 25

## 2023-12-22 PROCEDURE — 99214 OFFICE O/P EST MOD 30 MIN: CPT

## 2023-12-22 PROCEDURE — G0463 HOSPITAL OUTPT CLINIC VISIT: HCPCS

## 2023-12-22 ASSESSMENT — PAIN SCALES - GENERAL: PAINLEVEL: NO PAIN (0)

## 2023-12-22 NOTE — PROGRESS NOTES
Assessment & Plan   Sandro Yoon is a 78 year old presenting for the following health issues:      ICD-10-CM    1. Dermatitis  L30.9       2. Pre-diabetes  R73.03 Comprehensive Metabolic Panel     Hemoglobin A1c     Lipid Panel     Comprehensive Metabolic Panel     Hemoglobin A1c     Lipid Panel      3. Need for COVID-19 vaccine  Z23 COVID-19 12+ (2023-24) (PFIZER)      4. Mixed hyperlipidemia  E78.2 atorvastatin (LIPITOR) 10 MG tablet        Dermatitis-suspect that this is either allergy related or age related dry skin changes resulting in pruritus.  Instructed patient to use daily moisturizer, steroid cream at localized areas of irritation.  Encouraged him to follow through with allergist appointment for further workup.  Return to dermatology if needed.    CKD: Stable, chronic-instructed to avoid ibuprofen use.    Hyperlipidemia-ASCVD risk score is elevated at 31.9%-will encourage restarting statin as his rash was unchanged after discontinuing this medication.  Prescription sent to pharmacy.    Prediabetes-A1c is improved from previous now at 5.8.    Patient received COVID-vaccine today.       The 10-year ASCVD risk score (Josiane DK, et al., 2019) is: 31.9%    Values used to calculate the score:      Age: 78 years      Sex: Male      Is Non- : No      Diabetic: No      Tobacco smoker: No      Systolic Blood Pressure: 122 mmHg      Is BP treated: Yes      HDL Cholesterol: 57 mg/dL      Total Cholesterol: 195 mg/dL    No follow-ups on file.    SUMAYA Tracey CNP  [facility]    Koko Webb is a 78 year old, presenting for the following health issues:  Patient presents to clinic to discuss ongoing rash on his chest and abdomen.  He noticed this rash several months ago and was evaluated in clinic and also through dermatology.  He has tried multiple steroid creams, Kenalog injection through dermatology.  Ultimately, he was referred to an allergist.   Patient was supposed to have the appointment today with an allergist but canceled this due to not wanting to drive in [place].  He states that the rash is improved after Kenalog injection but continues to have areas pop up on his skin that become irritated and itchy.  He itches it to the point where he is picking scabs off.  He denies any new medication, he discontinued his statin but did not notice any changes with this.  Patient would also like repeat lab work checked since he has been off of his statin, would like COVID-19 vaccine      RECHECK (Rash )  History of Present Illness       CKD: He uses over the counter pain medication, including acetaminophen, one time daily.    Hyperlipidemia:  He presents for follow up of hyperlipidemia.   He is not taking medication to lower cholesterol. He is not having myalgia or other side effects to statin medications.    Reason for visit:  Body rash    He eats 2-3 servings of fruits and vegetables daily.He exercises with enough effort to increase his heart rate 20 to 29 minutes per day.  He exercises with enough effort to increase his heart rate 4 days per week.   He is taking medications regularly.                 Review of Systems   Skin: Positive for rash.        +pruritus    All other systems reviewed and are negative.           Objective    There were no vitals taken for this visit.  There is no height or weight on file to calculate BMI.  Physical Exam  Vitals reviewed.   Constitutional:       General: He is not in acute distress.     Appearance: Normal appearance. He is not ill-appearing or toxic-appearing.   Cardiovascular:      Rate and Rhythm: Normal rate and regular rhythm.      Pulses: Normal pulses.      Heart sounds: Normal heart sounds. No murmur heard.  Pulmonary:      Effort: Pulmonary effort is normal. No respiratory distress.      Breath sounds: Normal breath sounds. No wheezing or rhonchi.   Skin:     Findings: Rash ( Patchy mild dermatitis noted on chest  and back, much improved from clinic picture noted on 9/2023  ) present.   Neurological:      General: No focal deficit present.      Mental Status: He is alert. Mental status is at baseline.            Results for orders placed or performed in visit on 12/22/23   Comprehensive Metabolic Panel     Status: Abnormal   Result Value Ref Range    Sodium 141 135 - 145 mmol/L    Potassium 4.1 3.4 - 5.3 mmol/L    Carbon Dioxide (CO2) 25 22 - 29 mmol/L    Anion Gap 11 7 - 15 mmol/L    Urea Nitrogen 30.9 (H) 8.0 - 23.0 mg/dL    Creatinine 1.34 (H) 0.67 - 1.17 mg/dL    GFR Estimate 54 (L) >60 mL/min/1.73m2    Calcium 9.9 8.8 - 10.2 mg/dL    Chloride 105 98 - 107 mmol/L    Glucose 104 (H) 70 - 99 mg/dL    Alkaline Phosphatase 92 40 - 150 U/L    AST 26 0 - 45 U/L    ALT 33 0 - 70 U/L    Protein Total 7.2 6.4 - 8.3 g/dL    Albumin 4.2 3.5 - 5.2 g/dL    Bilirubin Total 1.8 (H) <=1.2 mg/dL   Hemoglobin A1c     Status: Normal   Result Value Ref Range    Hemoglobin A1C 5.8 4.0 - 6.2 %   Lipid Panel     Status: Abnormal   Result Value Ref Range    Cholesterol 195 <200 mg/dL    Triglycerides 117 <150 mg/dL    Direct Measure HDL 57 >=40 mg/dL    LDL Cholesterol Calculated 115 (H) <=100 mg/dL    Non HDL Cholesterol 138 (H) <130 mg/dL    Patient Fasting > 8hrs? Yes     Narrative    Cholesterol  Desirable:  <200 mg/dL    Triglycerides  Normal:  Less than 150 mg/dL  Borderline High:  150-199 mg/dL  High:  200-499 mg/dL  Very High:  Greater than or equal to 500 mg/dL    Direct Measure HDL  Female:  Greater than or equal to 50 mg/dL   Male:  Greater than or equal to 40 mg/dL    LDL Cholesterol  Desirable:  <100mg/dL  Above Desirable:  100-129 mg/dL   Borderline High:  130-159 mg/dL   High:  160-189 mg/dL   Very High:  >= 190 mg/dL    Non HDL Cholesterol  Desirable:  130 mg/dL  Above Desirable:  130-159 mg/dL  Borderline High:  160-189 mg/dL  High:  190-219 mg/dL  Very High:  Greater than or equal to 220 mg/dL

## 2023-12-22 NOTE — PATIENT INSTRUCTIONS
Use a daily moisturizer consistently on entire body- steroid cream on itchy patches as needed. Reschedule with allergy specialist if needed. We will recheck labwork today.

## 2023-12-22 NOTE — NURSING NOTE
"Chief Complaint   Patient presents with    RECHECK     Rash      Patient presents to the clinic today for a rash since July  Initial There were no vitals taken for this visit. Estimated body mass index is 23.02 kg/m  as calculated from the following:    Height as of 9/11/23: 1.727 m (5' 8\").    Weight as of 9/11/23: 68.7 kg (151 lb 6.4 oz).  Meds Reconciled: complete      Sarah Davidson LPN,LPN on 12/22/2023 at 7:46 AM  Ext. [phone]        Sarah Davidson LPN  "

## 2023-12-23 RX ORDER — ATORVASTATIN CALCIUM 10 MG/1
10 TABLET, FILM COATED ORAL DAILY
Qty: 90 TABLET | Refills: 0 | Status: SHIPPED | OUTPATIENT
Start: 2023-12-23 | End: 2024-03-28

## 2023-12-23 ASSESSMENT — ENCOUNTER SYMPTOMS: ROS SKIN COMMENTS: +PRURITUS

## 2024-01-31 NOTE — TELEPHONE ENCOUNTER
Routing refill request to provider for review/approval because:  Medication is reported/historical    LOV: 3/28/19  Mary Pace RN on 5/9/2019 at 9:29 AM           0 (no pain/absence of nonverbal indicators of pain)

## 2024-03-08 ENCOUNTER — OFFICE VISIT (OUTPATIENT)
Dept: FAMILY MEDICINE | Facility: OTHER | Age: 79
End: 2024-03-08
Payer: MEDICARE

## 2024-03-08 VITALS
HEART RATE: 75 BPM | DIASTOLIC BLOOD PRESSURE: 77 MMHG | OXYGEN SATURATION: 97 % | SYSTOLIC BLOOD PRESSURE: 122 MMHG | TEMPERATURE: 97.8 F | WEIGHT: 150.4 LBS | HEIGHT: 68 IN | BODY MASS INDEX: 22.79 KG/M2 | RESPIRATION RATE: 18 BRPM

## 2024-03-08 DIAGNOSIS — J06.9 VIRAL URI WITH COUGH: Primary | ICD-10-CM

## 2024-03-08 DIAGNOSIS — J02.9 SORE THROAT: ICD-10-CM

## 2024-03-08 DIAGNOSIS — R05.1 ACUTE COUGH: ICD-10-CM

## 2024-03-08 LAB
FLUAV RNA SPEC QL NAA+PROBE: NEGATIVE
FLUBV RNA RESP QL NAA+PROBE: NEGATIVE
GROUP A STREP BY PCR: NOT DETECTED
RSV RNA SPEC NAA+PROBE: NEGATIVE
SARS-COV-2 RNA RESP QL NAA+PROBE: NEGATIVE

## 2024-03-08 PROCEDURE — 87651 STREP A DNA AMP PROBE: CPT | Mod: ZL

## 2024-03-08 PROCEDURE — G0463 HOSPITAL OUTPT CLINIC VISIT: HCPCS

## 2024-03-08 PROCEDURE — 99213 OFFICE O/P EST LOW 20 MIN: CPT

## 2024-03-08 PROCEDURE — 87637 SARSCOV2&INF A&B&RSV AMP PRB: CPT | Mod: ZL

## 2024-03-08 ASSESSMENT — PAIN SCALES - GENERAL: PAINLEVEL: NO PAIN (0)

## 2024-03-08 NOTE — NURSING NOTE
"Chief Complaint   Patient presents with    Cough     Present couple days    Pharyngitis     Patient states that he has tried Robitussin DM this morning with little relief.    Initial /77 (BP Location: Left arm, Patient Position: Sitting, Cuff Size: Adult Regular)   Pulse 75   Temp 97.8  F (36.6  C) (Temporal)   Resp 18   Ht 1.727 m (5' 8\")   Wt 68.2 kg (150 lb 6.4 oz)   SpO2 97%   BMI 22.87 kg/m   Estimated body mass index is 22.87 kg/m  as calculated from the following:    Height as of this encounter: 1.727 m (5' 8\").    Weight as of this encounter: 68.2 kg (150 lb 6.4 oz).       FOOD SECURITY SCREENING QUESTIONS:    The next two questions are to help us understand your food security.  If you are feeling you need any assistance in this area, we have resources available to support you today.    Hunger Vital Signs:  Within the past 12 months we worried whether our food would run out before we got money to buy more. Never  Within the past 12 months the food we bought just didn't last and we didn't have money to get more. Never  Gabrielle Sullivan LPN on 3/8/2024 at 2:25 PM     Gabrielle Sullivan   "

## 2024-03-08 NOTE — PROGRESS NOTES
ASSESSMENT/PLAN:    I have reviewed the nursing notes.  I have reviewed the findings, diagnosis, plan and need for follow up with the patient.    1. Viral URI with cough  2. Sore throat  3. Acute cough  - Symptomatic Influenza A/B, RSV, & SARS-CoV2 PCR (COVID-19) Nose  - Group A Streptococcus PCR Throat Swab    Patient presents with upper respiratory symptoms.  Patient's vitals are stable and he appears nontoxic.  Strep and multiplex test were both negative. Discussed with patient that symptoms and exam are consistent with viral illness.  Discussed that symptomatic treatment of cough is appropriate but not with antibiotics.    Discussed symptomatic treatment - Encouraged fluids, salt water gargles, honey (only if greater than 1 year in age due to risk of botulism), elevation, humidifier, sinus rinse/netti pot, lozenges, tea, topical vapor rub, popsicles, rest, etc. May use over-the-counter Tylenol or ibuprofen PRN.    Discussed warning signs/symptoms indicative of need to f/u    Follow up if symptoms persist or worsen or concerns    I explained my diagnostic considerations and recommendations to the patient, who voiced understanding and agreement with the treatment plan. All questions were answered. We discussed potential side effects of any prescribed or recommended therapies, as well as expectations for response to treatments.    Kamran Farias, SUMAYA CNP  3/8/2024  2:35 PM    HPI:    Sandro Yoon is a 79 year old male  who presents to [facility] today for concerns of URI symptoms    URI, x 2 days    Symptoms:  No fevers or chills.   YES: +  sore throat/pharyngitis/tonsillitis.   YES: +  allergy/URI Symptoms  No muffled sounds/change in hearing  YES: +  sensation of fullness in ear(s)  No ringing in ears/tinnitus  No balance changes  YES: +  dizziness  YES: +  congestion (head/nasal/chest)  YES: +  cough/productive cough  No post nasal drip   YES: +  headache  No sinus pain/pressure  No myalgias  No  otalgia  No rash  Activity Level Changes: No  Appetite/Liquid Intake Changes: No  Changes to Bowel Habits: No  Changes to Bladder Habits: No  Additional Symptoms to Report: Yes: chest tightness  History of similar symptoms: Yes: hx of pneumonia  Prior workup: No    Treatments tried: OTC Cough med, Fluids, and Rest    Site of exposure: foreign travel - was recently in [place]  Type of exposure: not known    Other Pertinent History: heart disease and s/p tonsillectomy    Allergies: reviewed    PCP: Johnny    Past Medical History:   Diagnosis Date    Coronary artery disease     Hypertension     Hypothyroid     Stented coronary artery     x4    Transient global amnesia     hx of     Past Surgical History:   Procedure Laterality Date    APPENDECTOMY OPEN      COLONOSCOPY  04/20/2009 2009,Due 2019    CYSTOSCOPY, TRANSURETHRAL RESECTION (TUR) TUMOR BLADDER, COMBINED  2015    ENDOSCOPIC RETROGRADE CHOLANGIOPANCREATOGRAM N/A 9/23/2019    Procedure: ENDOSCOPIC RETROGRADE CHOLANGIOPANCREATOGRAPHY, WITH TUBE OR STENT INSERTION, spinchterotomy, balloon sweep of bile duct and stent placement;  Surgeon: Evan Traore MD;  Location: [facility] OR    ENDOSCOPIC ULTRASOUND UPPER GASTROINTESTINAL TRACT (GI) N/A 5/13/2019    Procedure: Diagnostic Upper Endoscopic Ultrasound;  Surgeon: Evan Traore MD;  Location: [facility] OR    ESOPHAGOSCOPY, GASTROSCOPY, DUODENOSCOPY (EGD), COMBINED N/A 9/23/2019    Procedure: Esophagogastroduodenoscopy, With Fine Needle Aspiration Biopsy, With Endoscopic Ultrasound Guidance;  Surgeon: Evan Traore MD;  Location: [facility] OR    EXTRACAPSULAR CATARACT EXTRATION WITH INTRAOCULAR LENS IMPLANT Bilateral     HERNIORRHAPHY INGUINAL Right 1/9/2019    Procedure: Repair Right Inguinal Hernia w/ Mesh;  Surgeon: Aurora Verdin MD;  Location: [facility] OR    LAPAROSCOPIC CHOLECYSTECTOMY      2007,with cholangiogram    SIGMOIDOSCOPY FLEXIBLE      1998    STENT  2005    90% LAD & 99% SECOND DIAGONAL    STENT  2003     LAD    STENT  2018    LAD times 2    TONSILLECTOMY      No Comments Provided     Social History     Tobacco Use    Smoking status: Never    Smokeless tobacco: Never   Substance Use Topics    Alcohol use: Not Currently     Comment: zero use since 6/2018     Current Outpatient Medications   Medication Sig Dispense Refill    acetaminophen (TYLENOL) 500 MG tablet Take 500-1,000 mg by mouth every 6 hours as needed for mild pain      atorvastatin (LIPITOR) 10 MG tablet Take 1 tablet (10 mg) by mouth daily 90 tablet 0    BRILINTA 90 MG tablet Take 90 mg by mouth 2 times daily  2    diclofenac (VOLTAREN) 1 % topical gel Apply topically 2 times daily      fluorouracil (EFUDEX) 5 % external cream Apply topically At Bedtime      fluticasone (FLONASE) 50 MCG/ACT nasal spray Spray 2 sprays in nostril daily 48 g 3    levothyroxine (SYNTHROID/LEVOTHROID) 88 MCG tablet TAKE 1 TABLET(88 MCG) BY MOUTH DAILY 90 tablet 3    loratadine (CLARITIN) 10 MG tablet Take 10 mg by mouth daily as needed       metoprolol succinate ER (TOPROL XL) 50 MG 24 hr tablet TAKE 1 TABLET(50 MG) BY MOUTH DAILY 90 tablet 3    nitroGLYcerin (NITROSTAT) 0.4 MG sublingual tablet For chest pain place 1 tablet under the tongue every 5 minutes for 3 doses. If symptoms persist 5 minutes after 1st dose call 911. 25 tablet 3    predniSONE (DELTASONE) 20 MG tablet Take 1 tablet (20 mg) by mouth daily 5 tablet 0    ramipril (ALTACE) 2.5 MG capsule Take 1 capsule (2.5 mg) by mouth daily 90 capsule 3     Allergies   Allergen Reactions    Co Q 10 [Coenzyme Q10] Other (See Comments)     Encephalopathy, transaminitis.    Hydrocodone-Acetaminophen Nausea and Vomiting    Thimerosal (Thiomersal) Swelling    Diatrizoate Rash    Plavix [Clopidogrel] Rash     Past medical history, past surgical history, current medications and allergies reviewed and accurate to the best of my knowledge.      ROS:  Refer to HPI    /77 (BP Location: Left arm, Patient Position: Sitting,  "Cuff Size: Adult Regular)   Pulse 75   Temp 97.8  F (36.6  C) (Temporal)   Resp 18   Ht 1.727 m (5' 8\")   Wt 68.2 kg (150 lb 6.4 oz)   SpO2 97%   BMI 22.87 kg/m      EXAM:  General Appearance: Well appearing 79 year old male, appropriate appearance for age. No acute distress   Ears: Left TM intact, translucent with bony landmarks appreciated, no erythema, no effusion, no bulging, no purulence.  Right TM intact, translucent with bony landmarks appreciated, no erythema, no effusion, no bulging, no purulence.  Left auditory canal clear.  Right auditory canal clear.  Normal external ears, non tender.  Eyes: conjunctivae normal without erythema or irritation, corneas clear, no drainage or crusting, no eyelid swelling, pupils equal   Oropharynx: moist mucous membranes, posterior pharynx without erythema, tonsils absent, no post nasal drip seen, no trismus, voice clear.    Sinuses:  No sinus tenderness upon palpation of the frontal or maxillary sinuses  Nose:  Bilateral nares: no erythema, no edema, no drainage or congestion   Neck: supple without adenopathy  Respiratory: normal chest wall and respirations.  Normal effort.  Clear to auscultation bilaterally, no wheezing, crackles or rhonchi.  No increased work of breathing.  No cough appreciated.  Cardiac: RRR with no murmurs  Musculoskeletal:  Equal movement of bilateral upper extremities.  Equal movement of bilateral lower extremities.  Normal gait.    Dermatological: no rashes noted of exposed skin  Neuro: Alert and oriented to person, place, and time.    Psychological: normal affect, alert, oriented, and pleasant.     Labs:  Results for orders placed or performed in visit on 03/08/24   Symptomatic Influenza A/B, RSV, & SARS-CoV2 PCR (COVID-19) Nose     Status: Normal    Specimen: Nose; Swab   Result Value Ref Range    Influenza A PCR Negative Negative    Influenza B PCR Negative Negative    RSV PCR Negative Negative    SARS CoV2 PCR Negative Negative    " Narrative    Testing was performed using the Xpert Xpress CoV2/Flu/RSV Assay on the [employer] Instrument. This test should be ordered for the detection of SARS-CoV-2, influenza, and RSV viruses in individuals who meet clinical and/or epidemiological criteria. Test performance is unknown in asymptomatic patients. This test is for in vitro diagnostic use under the FDA EUA for laboratories certified under CLIA to perform high or moderate complexity testing. This test has not been FDA cleared or approved. A negative result does not rule out the presence of PCR inhibitors in the specimen or target RNA in concentration below the limit of detection for the assay. If only one viral target is positive but coinfection with multiple targets is suspected, the sample should be re-tested with another FDA cleared, approved, or authorized test, if coinfection would change clinical management. This test was validated by the [facility] [employer]. These laboratories are certified under the Clinical Laboratory Improvement Amendments of 1988 (CLIA-88) as qualified to perform high complexity laboratory testing.   Group A Streptococcus PCR Throat Swab     Status: Normal    Specimen: Throat; Swab   Result Value Ref Range    Group A strep by PCR Not Detected Not Detected    Narrative    The Xpert Xpress Strep A test, performed on the [employer], is a rapid, qualitative in vitro diagnostic test for the detection of Streptococcus pyogenes (Group A ß-hemolytic Streptococcus, Strep A) in throat swab specimens from patients with signs and symptoms of pharyngitis. The Xpert Xpress Strep A test can be used as an aid in the diagnosis of Group A Streptococcal pharyngitis. The assay is not intended to monitor treatment for Group A Streptococcus infections. The Xpert Xpress Strep A test utilizes an automated real-time polymerase chain reaction (PCR) to detect Streptococcus pyogenes DNA.

## 2024-03-25 DIAGNOSIS — E78.2 MIXED HYPERLIPIDEMIA: ICD-10-CM

## 2024-03-28 RX ORDER — ATORVASTATIN CALCIUM 10 MG/1
10 TABLET, FILM COATED ORAL DAILY
Qty: 90 TABLET | Refills: 0 | Status: SHIPPED | OUTPATIENT
Start: 2024-03-28 | End: 2024-05-31

## 2024-03-28 NOTE — TELEPHONE ENCOUNTER
[facility] sent Rx request for the following:      Requested Prescriptions   Pending Prescriptions Disp Refills    atorvastatin (LIPITOR) 10 MG tablet [Pharmacy Med Name: ATORVASTATIN 10MG TABLETS] 90 tablet 0     Sig: TAKE 1 TABLET(10 MG) BY MOUTH DAILY       Last Prescription Date:   12/23/23  Last Fill Qty/Refills:         90, R-0    Last Office Visit:              12/22/23   Future Office visit:           5/31/24    Prescription approved per [facility] Refill Protocol.  Teresa Parkinson RN on 3/28/2024 at 9:51 AM

## 2024-05-02 DIAGNOSIS — D49.0 IPMN (INTRADUCTAL PAPILLARY MUCINOUS NEOPLASM): Primary | Chronic | ICD-10-CM

## 2024-05-31 ENCOUNTER — APPOINTMENT (OUTPATIENT)
Dept: LAB | Facility: OTHER | Age: 79
End: 2024-05-31
Attending: INTERNAL MEDICINE
Payer: MEDICARE

## 2024-05-31 ENCOUNTER — OFFICE VISIT (OUTPATIENT)
Dept: PEDIATRICS | Facility: OTHER | Age: 79
End: 2024-05-31
Attending: INTERNAL MEDICINE
Payer: COMMERCIAL

## 2024-05-31 VITALS
OXYGEN SATURATION: 96 % | WEIGHT: 150.2 LBS | TEMPERATURE: 97.4 F | SYSTOLIC BLOOD PRESSURE: 124 MMHG | DIASTOLIC BLOOD PRESSURE: 72 MMHG | RESPIRATION RATE: 16 BRPM | HEART RATE: 108 BPM | HEIGHT: 68 IN | BODY MASS INDEX: 22.76 KG/M2

## 2024-05-31 DIAGNOSIS — I20.89 ATYPICAL ANGINA (H): ICD-10-CM

## 2024-05-31 DIAGNOSIS — M48.061 LUMBAR FORAMINAL STENOSIS: ICD-10-CM

## 2024-05-31 DIAGNOSIS — M51.369 DDD (DEGENERATIVE DISC DISEASE), LUMBAR: ICD-10-CM

## 2024-05-31 DIAGNOSIS — L30.8 SPONGIOTIC DERMATITIS: ICD-10-CM

## 2024-05-31 DIAGNOSIS — D49.0 IPMN (INTRADUCTAL PAPILLARY MUCINOUS NEOPLASM): Chronic | ICD-10-CM

## 2024-05-31 DIAGNOSIS — I25.10 ASCVD (ARTERIOSCLEROTIC CARDIOVASCULAR DISEASE): Chronic | ICD-10-CM

## 2024-05-31 DIAGNOSIS — N18.31 STAGE 3A CHRONIC KIDNEY DISEASE (H): Chronic | ICD-10-CM

## 2024-05-31 DIAGNOSIS — Z98.890 H/O CARDIAC CATHETERIZATION: ICD-10-CM

## 2024-05-31 DIAGNOSIS — Z00.00 ENCOUNTER FOR MEDICARE ANNUAL WELLNESS EXAM: Primary | ICD-10-CM

## 2024-05-31 DIAGNOSIS — M47.816 LUMBAR FACET ARTHROPATHY: ICD-10-CM

## 2024-05-31 DIAGNOSIS — Z98.890 HX OF CARDIAC CATH: ICD-10-CM

## 2024-05-31 DIAGNOSIS — H91.90 HEARING LOSS, UNSPECIFIED HEARING LOSS TYPE, UNSPECIFIED LATERALITY: ICD-10-CM

## 2024-05-31 DIAGNOSIS — I10 ESSENTIAL HYPERTENSION: ICD-10-CM

## 2024-05-31 DIAGNOSIS — E03.9 ACQUIRED HYPOTHYROIDISM: ICD-10-CM

## 2024-05-31 DIAGNOSIS — R73.03 PRE-DIABETES: ICD-10-CM

## 2024-05-31 DIAGNOSIS — Z95.5 HISTORY OF CORONARY ARTERY STENT PLACEMENT: ICD-10-CM

## 2024-05-31 DIAGNOSIS — E78.2 MIXED HYPERLIPIDEMIA: ICD-10-CM

## 2024-05-31 DIAGNOSIS — J30.9 ALLERGIC RHINITIS, UNSPECIFIED SEASONALITY, UNSPECIFIED TRIGGER: ICD-10-CM

## 2024-05-31 LAB
ALBUMIN SERPL BCG-MCNC: 4.4 G/DL (ref 3.5–5.2)
ALP SERPL-CCNC: 98 U/L (ref 40–150)
ALT SERPL W P-5'-P-CCNC: 20 U/L (ref 0–70)
ANION GAP SERPL CALCULATED.3IONS-SCNC: 9 MMOL/L (ref 7–15)
AST SERPL W P-5'-P-CCNC: 25 U/L (ref 0–45)
BILIRUB SERPL-MCNC: 2 MG/DL
BUN SERPL-MCNC: 20.6 MG/DL (ref 8–23)
CALCIUM SERPL-MCNC: 10 MG/DL (ref 8.8–10.2)
CHLORIDE SERPL-SCNC: 105 MMOL/L (ref 98–107)
CHOLEST SERPL-MCNC: 129 MG/DL
CREAT SERPL-MCNC: 1.18 MG/DL (ref 0.67–1.17)
CREAT UR-MCNC: 145.2 MG/DL
DEPRECATED HCO3 PLAS-SCNC: 27 MMOL/L (ref 22–29)
EGFRCR SERPLBLD CKD-EPI 2021: 62 ML/MIN/1.73M2
ERYTHROCYTE [DISTWIDTH] IN BLOOD BY AUTOMATED COUNT: 12.4 % (ref 10–15)
FASTING STATUS PATIENT QL REPORTED: YES
FASTING STATUS PATIENT QL REPORTED: YES
GLUCOSE SERPL-MCNC: 97 MG/DL (ref 70–99)
HBA1C MFR BLD: 5.9 % (ref 4–6.2)
HCT VFR BLD AUTO: 48.7 % (ref 40–53)
HDLC SERPL-MCNC: 56 MG/DL
HGB BLD-MCNC: 15.8 G/DL (ref 13.3–17.7)
LDLC SERPL CALC-MCNC: 52 MG/DL
MCH RBC QN AUTO: 33.1 PG (ref 26.5–33)
MCHC RBC AUTO-ENTMCNC: 32.4 G/DL (ref 31.5–36.5)
MCV RBC AUTO: 102 FL (ref 78–100)
MICROALBUMIN UR-MCNC: <12 MG/L
MICROALBUMIN/CREAT UR: NORMAL MG/G{CREAT}
NONHDLC SERPL-MCNC: 73 MG/DL
PLATELET # BLD AUTO: 251 10E3/UL (ref 150–450)
POTASSIUM SERPL-SCNC: 3.6 MMOL/L (ref 3.4–5.3)
PROT SERPL-MCNC: 7.3 G/DL (ref 6.4–8.3)
RBC # BLD AUTO: 4.78 10E6/UL (ref 4.4–5.9)
SODIUM SERPL-SCNC: 141 MMOL/L (ref 135–145)
TRIGL SERPL-MCNC: 105 MG/DL
TSH SERPL DL<=0.005 MIU/L-ACNC: 0.43 UIU/ML (ref 0.3–4.2)
WBC # BLD AUTO: 6.7 10E3/UL (ref 4–11)

## 2024-05-31 PROCEDURE — 82040 ASSAY OF SERUM ALBUMIN: CPT | Mod: ZL | Performed by: INTERNAL MEDICINE

## 2024-05-31 PROCEDURE — 99214 OFFICE O/P EST MOD 30 MIN: CPT | Mod: 25 | Performed by: INTERNAL MEDICINE

## 2024-05-31 PROCEDURE — 85027 COMPLETE CBC AUTOMATED: CPT | Mod: ZL | Performed by: INTERNAL MEDICINE

## 2024-05-31 PROCEDURE — G0463 HOSPITAL OUTPT CLINIC VISIT: HCPCS | Mod: 25

## 2024-05-31 PROCEDURE — 80061 LIPID PANEL: CPT | Mod: ZL | Performed by: INTERNAL MEDICINE

## 2024-05-31 PROCEDURE — 82043 UR ALBUMIN QUANTITATIVE: CPT | Mod: ZL | Performed by: INTERNAL MEDICINE

## 2024-05-31 PROCEDURE — 83036 HEMOGLOBIN GLYCOSYLATED A1C: CPT | Mod: ZL | Performed by: INTERNAL MEDICINE

## 2024-05-31 PROCEDURE — 84443 ASSAY THYROID STIM HORMONE: CPT | Mod: ZL | Performed by: INTERNAL MEDICINE

## 2024-05-31 PROCEDURE — G0439 PPPS, SUBSEQ VISIT: HCPCS | Performed by: INTERNAL MEDICINE

## 2024-05-31 PROCEDURE — 36415 COLL VENOUS BLD VENIPUNCTURE: CPT | Mod: ZL | Performed by: INTERNAL MEDICINE

## 2024-05-31 PROCEDURE — 90480 ADMN SARSCOV2 VAC 1/ONLY CMP: CPT

## 2024-05-31 RX ORDER — FLUTICASONE PROPIONATE 50 MCG
2 SPRAY, SUSPENSION (ML) NASAL DAILY
Qty: 48 G | Refills: 4 | Status: SHIPPED | OUTPATIENT
Start: 2024-05-31

## 2024-05-31 RX ORDER — BETAMETHASONE DIPROPIONATE 0.5 MG/G
CREAM TOPICAL 2 TIMES DAILY PRN
COMMUNITY
Start: 2024-01-22

## 2024-05-31 RX ORDER — RAMIPRIL 2.5 MG/1
2.5 CAPSULE ORAL DAILY
Qty: 90 CAPSULE | Refills: 4 | Status: SHIPPED | OUTPATIENT
Start: 2024-05-31

## 2024-05-31 RX ORDER — NITROGLYCERIN 0.4 MG/1
TABLET SUBLINGUAL
Qty: 25 TABLET | Refills: 3 | Status: SHIPPED | OUTPATIENT
Start: 2024-05-31

## 2024-05-31 RX ORDER — LORATADINE 10 MG/1
10 TABLET ORAL DAILY PRN
Qty: 90 TABLET | Refills: 4 | Status: SHIPPED | OUTPATIENT
Start: 2024-05-31

## 2024-05-31 RX ORDER — METOPROLOL SUCCINATE 50 MG/1
50 TABLET, EXTENDED RELEASE ORAL DAILY
Qty: 90 TABLET | Refills: 4 | Status: SHIPPED | OUTPATIENT
Start: 2024-05-31

## 2024-05-31 RX ORDER — LEVOTHYROXINE SODIUM 88 UG/1
88 TABLET ORAL DAILY
Qty: 90 TABLET | Refills: 4 | Status: SHIPPED | OUTPATIENT
Start: 2024-05-31

## 2024-05-31 RX ORDER — ATORVASTATIN CALCIUM 10 MG/1
10 TABLET, FILM COATED ORAL DAILY
Qty: 90 TABLET | Refills: 4 | Status: SHIPPED | OUTPATIENT
Start: 2024-05-31

## 2024-05-31 SDOH — HEALTH STABILITY: PHYSICAL HEALTH: ON AVERAGE, HOW MANY DAYS PER WEEK DO YOU ENGAGE IN MODERATE TO STRENUOUS EXERCISE (LIKE A BRISK WALK)?: 5 DAYS

## 2024-05-31 SDOH — HEALTH STABILITY: PHYSICAL HEALTH: ON AVERAGE, HOW MANY MINUTES DO YOU ENGAGE IN EXERCISE AT THIS LEVEL?: 20 MIN

## 2024-05-31 ASSESSMENT — SOCIAL DETERMINANTS OF HEALTH (SDOH): HOW OFTEN DO YOU GET TOGETHER WITH FRIENDS OR RELATIVES?: ONCE A WEEK

## 2024-05-31 ASSESSMENT — PAIN SCALES - GENERAL: PAINLEVEL: MODERATE PAIN (5)

## 2024-05-31 NOTE — PATIENT INSTRUCTIONS
-- Allergy Referral   -- Dr. Ochoa Chen, [facility] [phone]   -- Continue betamethasone    Modifiable factors in Dementia   -- Daily physical exercise   -- Daily mental exercise (eg crossword puzzles)   -- Maintain social connections   -- Get enough sleep   -- Don't use tobacco   -- Maintain normal blood pressure and blood sugar   -- Consider starting a medication to slow (but not stop) progression of memory loss    Patient Education           Understanding Dementia        Dementia is the name for a group of brain conditions that make it harder to remember, reason, and communicate. The most common form of dementia is Alzheimer disease. Other types include vascular dementia, frontotemporal dementia, and Lewy body dementia. Years ago, dementia was often called  senility.  It was even thought to be a normal part of aging. We now know that it s not normal. It s caused by ongoing damage to cells in the brain.    Symptoms of dementia    Symptoms differ depending on which parts of the brain are affected and the stage of the disease. The most common symptoms include:    Memory loss, including trouble with directions and familiar tasks  Language problems, such as trouble getting words out or understanding what is said  Trouble with planning, organizing, concentration, and judgment. This includes people not being able to recognize their own symptoms.  Changes in behavior and personality    How dementia affects the brain    The brain controls all the workings of the mind and body. Some parts of the brain control memory and language. Other parts control movement and coordination. With dementia, nerve cells in the brain are gradually damaged or destroyed. Why this happens is not yet clear. But over time, parts of the brain begin to shrink (atrophy). This often starts in the part of the brain that controls memory, reasoning, and personality. Other parts of the brain may not be affected until much later in the  illness.    The stages of dementia    Dementia is a progressive disease. This means it gets worse over time. Symptoms differ for each person, but there are 3 basic stages. Each may last from months to years:    Early stage. A person may seem forgetful, confused, or have changes in behavior. However, he or she may still be able to handle most tasks without help.  Middle stage. More and more help is needed with daily tasks. A person may have trouble recognizing friends and family members, wander, or get lost in familiar places. He or she may also become restless or briceño.  Late stage. Dementia can cause severe problems with memory, judgment, and other skills. Help is needed with nearly every aspect of daily life.    Treating dementia    Right now, there s no cure for dementia. But with proper care, many people can live comfortably for years:     Medicines are a key part of treatment. Some types can help slow the progression of symptoms, such as memory loss. Others can help ease mood, behavior, and sleep problems. These medicines work for some people but not all.  Activity and exercise are good for body and mind. They may even help slow the progression of the disease. Simple, repetitive activities are good choices.  Regular healthcare provider visits help keep track of symptoms and overall health.  The sleep-wake cycle can be mixed up in people with dementia. They may function better being up at nighttime and sleeping during the daytime.    Social interactions are important to maintain.      Date Last Reviewed: 3/1/2018      5273-3109 [employer]. [address] [place] [address], [place] [address]. All rights reserved. This information is not intended as a substitute for professional medical care. Always follow your healthcare professional's instructions.          Patient Education     Preventive Care Advice   This is general advice we often give to help people stay healthy. Your care team may have specific advice  "just for you. Please talk to your care team about your own preventive care needs.  Lifestyle  Exercise at least 150 minutes each week (30 minutes a day, 5 days a week).  Do muscle strengthening activities 2 days a week. These help control your weight and prevent disease.  No smoking.  Wear sunscreen to prevent skin cancer.  Have your home tested for radon every 2 to 5 years. Radon is a colorless, odorless gas that can harm your lungs. To learn more, go to www.health.[facility].mn. and search for \"Radon in Homes.\"  Keep guns unloaded and locked up in a safe place like a safe or gun vault, or, use a gun lock and hide the keys. Always lock away bullets separately. To learn more, visit CamSemi.mn.gov and search for \"safe gun storage.\"  Nutrition  Eat 5 or more servings of fruits and vegetables each day.  Try wheat bread, brown rice and whole grain pasta (instead of white bread, rice, and pasta).  Get enough calcium and vitamin D. Check the label on foods and aim for 100% of the RDA (recommended daily allowance).  Regular exams  Have a dental exam and cleaning every 6 months.  See your health care team every year to talk about:  Any changes in your health.  Any medicines your care team has prescribed.  Preventive care, family planning, and ways to prevent chronic diseases.  Shots (vaccines)   HPV shots (up to age 26), if you've never had them before.  Hepatitis B shots (up to age 59), if you've never had them before.  COVID-19 shot: Get this shot when it's due.  Flu shot: Get a flu shot every year.  Tetanus shot: Get a tetanus shot every 10 years.  Pneumococcal, hepatitis A, and RSV shots: Ask your care team if you need these based on your risk.  Shingles shot (for age 50 and up).  General health tests  Diabetes screening:  Starting at age 35, Get screened for diabetes at least every 3 years.  If you are younger than age 35, ask your care team if you should be screened for diabetes.  Cholesterol test: At age 39, start having a " cholesterol test every 5 years, or more often if advised.  Bone density scan (DEXA): At age 50, ask your care team if you should have this scan for osteoporosis (brittle bones).  Hepatitis C: Get tested at least once in your life.  Abdominal aortic aneurysm screening: Talk to your doctor about having this screening if you:  Have ever smoked; and  Are biologically male; and  Are between the ages of 65 and 75.  STIs (sexually transmitted infections)  Before age 24: Ask your care team if you should be screened for STIs.  After age 24: Get screened for STIs if you're at risk. You are at risk for STIs (including HIV) if:  You are sexually active with more than one person.  You don't use condoms every time.  You or a partner was diagnosed with a sexually transmitted infection.  If you are at risk for HIV, ask about PrEP medicine to prevent HIV.  Get tested for HIV at least once in your life, whether you are at risk for HIV or not.  Cancer screening tests  Cervical cancer screening: If you have a cervix, begin getting regular cervical cancer screening tests at age 21. Most people who have regular screenings with normal results can stop after age 65. Talk about this with your provider.  Breast cancer scan (mammogram): If you've ever had breasts, begin having regular mammograms starting at age 40. This is a scan to check for breast cancer.  Colon cancer screening: It is important to start screening for colon cancer at age 45.  Have a colonoscopy test every 10 years (or more often if you're at risk) Or, ask your provider about stool tests like a FIT test every year or Cologuard test every 3 years.  To learn more about your testing options, visit: www.TheDressSpot.com/742773.pdf.  For help making a decision, visit: rajan/ne86425.  Prostate cancer screening test: If you have a prostate and are age 55 to 69, ask your provider if you would benefit from a yearly prostate cancer screening test.  Lung cancer screening: If you are a  current or former smoker age 50 to 80, ask your care team if ongoing lung cancer screenings are right for you.  For informational purposes only. Not to replace the advice of your health care provider. Copyright   2023 [facility] [employer]. All rights reserved. Clinically reviewed by the [facility] Transitions Program. [employer] Inbox 511136 - REV 04/24.    Preventing Falls: Care Instructions  Injuries and health problems such as trouble walking or poor eyesight can increase your risk of falling. So can some medicines. But there are things you can do to help prevent falls. You can exercise to get stronger. You can also arrange your home to make it safer.    Talk to your doctor about the medicines you take. Ask if any of them increase the risk of falls and whether they can be changed or stopped.   Try to exercise regularly. It can help improve your strength and balance. This can help lower your risk of falling.     Practice fall safety and prevention.    Wear low-heeled shoes that fit well and give your feet good support. Talk to your doctor if you have foot problems that make this hard.  Carry a cellphone or wear a medical alert device that you can use to call for help.  Use stepladders instead of chairs to reach high objects. Don't climb if you're at risk for falls. Ask for help, if needed.  Wear the correct eyeglasses, if you need them.    Make your home safer.    Remove rugs, cords, clutter, and furniture from walkways.  Keep your house well lit. Use night-lights in hallways and bathrooms.  Install and use sturdy handrails on stairways.  Wear nonskid footwear, even inside. Don't walk barefoot or in socks without shoes.    Be safe outside.    Use handrails, curb cuts, and ramps whenever possible.  Keep your hands free by using a shoulder bag or backpack.  Try to walk in well-lit areas. Watch out for uneven ground, changes in pavement, and debris.  Be careful in the winter. Walk on the grass or gravel when  "sidewalks are slippery. Use de-icer on steps and walkways. Add non-slip devices to shoes.    Put grab bars and nonskid mats in your shower or tub and near the toilet. Try to use a shower chair or bath bench when bathing.   Get into a tub or shower by putting in your weaker leg first. Get out with your strong side first. Have a phone or medical alert device in the bathroom with you.   Where can you learn more?  Go to https://www.[employer]ap[employer].net/patiented  Enter G117 in the search box to learn more about \"Preventing Falls: Care Instructions.\"  Current as of: July 17, 2023               Content Version: 14.0    9525-8364 [employer].   Care instructions adapted under license by your healthcare professional. If you have questions about a medical condition or this instruction, always ask your healthcare professional. [employer] disclaims any warranty or liability for your use of this information.      Hearing Loss: Care Instructions  Overview     Hearing loss is a sudden or slow decrease in how well you hear. It can range from slight to profound. Permanent hearing loss can occur with aging. It also can happen when you are exposed long-term to loud noise. Examples include listening to loud music, riding motorcycles, or being around other loud machines.  Hearing loss can affect your work and home life. It can make you feel lonely or depressed. You may feel that you have lost your independence. But hearing aids and other devices can help you hear better and feel connected to others.  Follow-up care is a key part of your treatment and safety. Be sure to make and go to all appointments, and call your doctor if you are having problems. It's also a good idea to know your test results and keep a list of the medicines you take.  How can you care for yourself at home?  Avoid loud noises whenever possible. This helps keep your hearing from getting worse.  Always wear hearing protection around loud " "noises.  Wear a hearing aid as directed.  A professional can help you pick a hearing aid that will work best for you.  You can also get hearing aids over the counter for mild to moderate hearing loss.  Have hearing tests as your doctor suggests. They can show whether your hearing has changed. Your hearing aid may need to be adjusted.  Use other devices as needed. These may include:  Telephone amplifiers and hearing aids that can connect to a television, stereo, radio, or microphone.  Devices that use lights or vibrations. These alert you to the doorbell, a ringing telephone, or a baby monitor.  Television closed-captioning. This shows the words at the bottom of the screen. Most new TVs can do this.  TTY (text telephone). This lets you type messages back and forth on the telephone instead of talking or listening. These devices are also called TDD. When messages are typed on the keyboard, they are sent over the phone line to a receiving TTY. The message is shown on a monitor.  Use text messaging, social media, and email if it is hard for you to communicate by telephone.  Try to learn a listening technique called speechreading. It is not lipreading. You pay attention to people's gestures, expressions, posture, and tone of voice. These clues can help you understand what a person is saying. Face the person you are talking to, and have them face you. Make sure the lighting is good. You need to see the other person's face clearly.  Think about counseling if you need help to adjust to your hearing loss.  When should you call for help?  Watch closely for changes in your health, and be sure to contact your doctor if:    You think your hearing is getting worse.     You have new symptoms, such as dizziness or nausea.   Where can you learn more?  Go to https://www.healthwise.net/patiented  Enter R798 in the search box to learn more about \"Hearing Loss: Care Instructions.\"  Current as of: September 27, 2023               Content " Version: 14.0    3048-4217 [employer].   Care instructions adapted under license by your healthcare professional. If you have questions about a medical condition or this instruction, always ask your healthcare professional. [employer] disclaims any warranty or liability for your use of this information.      Learning About Stress  What is stress?     Stress is your body's response to a hard situation. Your body can have a physical, emotional, or mental response. Stress is a fact of life for most people, and it affects everyone differently. What causes stress for you may not be stressful for someone else.  A lot of things can cause stress. You may feel stress when you go on a job interview, take a test, or run a race. This kind of short-term stress is normal and even useful. It can help you if you need to work hard or react quickly. For example, stress can help you finish an important job on time.  Long-term stress is caused by ongoing stressful situations or events. Examples of long-term stress include long-term health problems, ongoing problems at work, or conflicts in your family. Long-term stress can harm your health.  How does stress affect your health?  When you are stressed, your body responds as though you are in danger. It makes hormones that speed up your heart, make you breathe faster, and give you a burst of energy. This is called the fight-or-flight stress response. If the stress is over quickly, your body goes back to normal and no harm is done.  But if stress happens too often or lasts too long, it can have bad effects. Long-term stress can make you more likely to get sick, and it can make symptoms of some diseases worse. If you tense up when you are stressed, you may develop neck, shoulder, or low back pain. Stress is linked to high blood pressure and heart disease.  Stress also harms your emotional health. It can make you briceño, tense, or depressed. Your relationships may  suffer, and you may not do well at work or school.  What can you do to manage stress?  You can try these things to help manage stress:   Do something active. Exercise or activity can help reduce stress. Walking is a great way to get started. Even everyday activities such as housecleaning or yard work can help.  Try yoga or james chi. These techniques combine exercise and meditation. You may need some training at first to learn them.  Do something you enjoy. For example, listen to music or go to a movie. Practice your hobby or do volunteer work.  Meditate. This can help you relax, because you are not worrying about what happened before or what may happen in the future.  Do guided imagery. Imagine yourself in any setting that helps you feel calm. You can use online videos, books, or a teacher to guide you.  Do breathing exercises. For example:  From a standing position, bend forward from the waist with your knees slightly bent. Let your arms dangle close to the floor.  Breathe in slowly and deeply as you return to a standing position. Roll up slowly and lift your head last.  Hold your breath for just a few seconds in the standing position.  Breathe out slowly and bend forward from the waist.  Let your feelings out. Talk, laugh, cry, and express anger when you need to. Talking with supportive friends or family, a counselor, or a mabel leader about your feelings is a healthy way to relieve stress. Avoid discussing your feelings with people who make you feel worse.  Write. It may help to write about things that are bothering you. This helps you find out how much stress you feel and what is causing it. When you know this, you can find better ways to cope.  What can you do to prevent stress?  You might try some of these things to help prevent stress:  Manage your time. This helps you find time to do the things you want and need to do.  Get enough sleep. Your body recovers from the stresses of the day while you are  "sleeping.  Get support. Your family, friends, and community can make a difference in how you experience stress.  Limit your news feed. Avoid or limit time on social media or news that may make you feel stressed.  Do something active. Exercise or activity can help reduce stress. Walking is a great way to get started.  Where can you learn more?  Go to https://www.[employer].net/patiented  Enter N032 in the search box to learn more about \"Learning About Stress.\"  Current as of: October 24, 2023               Content Version: 14.0    6979-6617 [employer].   Care instructions adapted under license by your healthcare professional. If you have questions about a medical condition or this instruction, always ask your healthcare professional. [employer] disclaims any warranty or liability for your use of this information.      Learning About Sleeping Well  What does sleeping well mean?     Sleeping well means getting enough sleep to feel good and stay healthy. How much sleep is enough varies among people.  The number of hours you sleep and how you feel when you wake up are both important. If you do not feel refreshed, you probably need more sleep. Another sign of not getting enough sleep is feeling tired during the day.  Experts recommend that adults get at least 7 or more hours of sleep per day. Children and older adults need more sleep.  Why is getting enough sleep important?  Getting enough quality sleep is a basic part of good health. When your sleep suffers, your physical health, mood, and your thoughts can suffer too. You may find yourself feeling more grumpy or stressed. Not getting enough sleep also can lead to serious problems, including injury, accidents, anxiety, and depression.  What might cause poor sleeping?  Many things can cause sleep problems, including:  Changes to your sleep schedule.  Stress. Stress can be caused by fear about a single event, such as giving a speech. Or you may " "have ongoing stress, such as worry about work or school.  Depression, anxiety, and other mental or emotional conditions.  Changes in your sleep habits or surroundings. This includes changes that happen where you sleep, such as noise, light, or sleeping in a different bed. It also includes changes in your sleep pattern, such as having jet lag or working a late shift.  Health problems, such as pain, breathing problems, and restless legs syndrome.  Lack of regular exercise.  Using alcohol, nicotine, or caffeine before bed.  How can you help yourself?  Here are some tips that may help you sleep more soundly and wake up feeling more refreshed.  Your sleeping area   Use your bedroom only for sleeping and sex. A bit of light reading may help you fall asleep. But if it doesn't, do your reading elsewhere in the house. Try not to use your TV, computer, smartphone, or tablet while you are in bed.  Be sure your bed is big enough to stretch out comfortably, especially if you have a sleep partner.  Keep your bedroom quiet, dark, and cool. Use curtains, blinds, or a sleep mask to block out light. To block out noise, use earplugs, soothing music, or a \"white noise\" machine.  Your evening and bedtime routine   Create a relaxing bedtime routine. You might want to take a warm shower or bath, or listen to soothing music.  Go to bed at the same time every night. And get up at the same time every morning, even if you feel tired.  What to avoid   Limit caffeine (coffee, tea, caffeinated sodas) during the day, and don't have any for at least 6 hours before bedtime.  Avoid drinking alcohol before bedtime. Alcohol can cause you to wake up more often during the night.  Try not to smoke or use tobacco, especially in the evening. Nicotine can keep you awake.  Limit naps during the day, especially close to bedtime.  Avoid lying in bed awake for too long. If you can't fall asleep or if you wake up in the middle of the night and can't get back to " "sleep within about 20 minutes, get out of bed and go to another room until you feel sleepy.  Avoid taking medicine right before bed that may keep you awake or make you feel hyper or energized. Your doctor can tell you if your medicine may do this and if you can take it earlier in the day.  If you can't sleep   Imagine yourself in a peaceful, pleasant scene. Focus on the details and feelings of being in a place that is relaxing.  Get up and do a quiet or boring activity until you feel sleepy.  Avoid drinking any liquids before going to bed to help prevent waking up often to use the bathroom.  Where can you learn more?  Go to https://www.BMdr.net/patiented  Enter J942 in the search box to learn more about \"Learning About Sleeping Well.\"  Current as of: July 10, 2023               Content Version: 14.0    4364-2750 [employer].   Care instructions adapted under license by your healthcare professional. If you have questions about a medical condition or this instruction, always ask your healthcare professional. Healthwise, [employer] disclaims any warranty or liability for your use of this information.      "

## 2024-05-31 NOTE — PROGRESS NOTES
Preventive Care Visit  [facility] AND [facility]  Guero Turner MD, Internal Medicine  May 31, 2024      1. Encounter for Medicare annual wellness exam  Colon and prostate cancer screening not recommended due to age    2. Spongiotic dermatitis  Diagnosed by dermatology.  Likely triggered by something in the allergy world.  Seems to be a variant of atopic dermatitis.  Discussed options and decided on allergy referral.  He is wondering if it could be cashew allergy which seems less likely to be as this should cause anaphylaxis which she has not experienced  - Adult Allergy/Asthma  Referral; Future    3. Mixed hyperlipidemia  Continue statin due for lipid  - atorvastatin (LIPITOR) 10 MG tablet; Take 1 tablet (10 mg) by mouth daily  Dispense: 90 tablet; Refill: 4  - Lipid Profile; Future  - Lipid Profile    4. Allergic rhinitis, unspecified seasonality, unspecified trigger  At goal, no change.  - fluticasone (FLONASE) 50 MCG/ACT nasal spray; Spray 2 sprays in nostril daily  Dispense: 48 g; Refill: 4  - loratadine (CLARITIN) 10 MG tablet; Take 1 tablet (10 mg) by mouth daily as needed for allergies  Dispense: 90 tablet; Refill: 4    5. Acquired hypothyroidism  Continue levothyroxine due for thyroid lab  - levothyroxine (SYNTHROID/LEVOTHROID) 88 MCG tablet; Take 1 tablet (88 mcg) by mouth daily  Dispense: 90 tablet; Refill: 4  - TSH with free T4 reflex; Future  - TSH with free T4 reflex    6. Essential hypertension  Continue medications due for lab work.At goal, no change.  - ramipril (ALTACE) 2.5 MG capsule; Take 1 capsule (2.5 mg) by mouth daily  Dispense: 90 capsule; Refill: 4  - metoprolol succinate ER (TOPROL XL) 50 MG 24 hr tablet; Take 1 tablet (50 mg) by mouth daily  Dispense: 90 tablet; Refill: 4    7. Lumbar foraminal stenosis  - ramipril (ALTACE) 2.5 MG capsule; Take 1 capsule (2.5 mg) by mouth daily  Dispense: 90 capsule; Refill: 4    8. Lumbar facet arthropathy  - ramipril (ALTACE) 2.5  MG capsule; Take 1 capsule (2.5 mg) by mouth daily  Dispense: 90 capsule; Refill: 4    9. DDD (degenerative disc disease), lumbar  - ramipril (ALTACE) 2.5 MG capsule; Take 1 capsule (2.5 mg) by mouth daily  Dispense: 90 capsule; Refill: 4    10. Atypical angina (H24)  - nitroGLYcerin (NITROSTAT) 0.4 MG sublingual tablet; For chest pain place 1 tablet under the tongue every 5 minutes for 3 doses. If symptoms persist 5 minutes after 1st dose call 911.  Dispense: 25 tablet; Refill: 3    11. Pre-diabetes  - Hemoglobin A1c; Future  - Hemoglobin A1c    12. Stage 3a chronic kidney disease (H)  - Albumin Random Urine Quantitative with Creat Ratio; Future  - Albumin Random Urine Quantitative with Creat Ratio    13. IPMN (intraductal papillary mucinous neoplasm)  He follows with gastroenterology.  He is going to be due for his follow-up MRCP this year  - CBC with platelets; Future  - Comprehensive metabolic panel; Future  - CBC with platelets  - Comprehensive metabolic panel    14. ASCVD (arteriosclerotic cardiovascular disease)  15. History of coronary artery stent placement to his LAD on 9/3/2003 and 6/16/2005  16. Hx of cardiac cath on 6/16/2005 and 9/3/2003 at [place] with stenting to the LAD  17. H/O cardiac catheterization on 8/14/18 with ×2 stents to his LAD through [facility] in [place], [place]  He follows with Dr. Tripathi of cardiology    18. Hearing loss, unspecified hearing loss type, unspecified laterality  Scant cerumen.  Recommend audiology  - Adult Audiology  Referral; Future    Patient Instructions    -- Allergy Referral   -- Dr. Ochoa Chen, [facility]  ph [phone]   -- Continue betamethasone    Modifiable factors in Dementia   -- Daily physical exercise   -- Daily mental exercise (eg crossword puzzles)   -- Maintain social connections   -- Get enough sleep   -- Don't use tobacco   -- Maintain normal blood pressure and blood sugar   -- Consider starting a medication to slow (but  not stop) progression of memory loss    Signed, Guero Turner MD, FAAP, FACP  Internal Medicine & Pediatrics      Subjective   Jon is a 79 year old, presenting for the following:  Medicare Visit (annual) and Recheck Medication  Ongoing rash          Health Care Directive  Patient has a Health Care Directive on file  Advance care planning document is on file and is current.    HPI              5/31/2024   General Health   How would you rate your overall physical health? Good   Feel stress (tense, anxious, or unable to sleep) Only a little   (!) STRESS CONCERN      5/31/2024   Nutrition   Diet: Low fat/cholesterol         5/31/2024   Exercise   Days per week of moderate/strenous exercise 5 days   Average minutes spent exercising at this level 20 min         5/31/2024   Social Factors   Frequency of gathering with friends or relatives Once a week   Worry food won't last until get money to buy more No   Food not last or not have enough money for food? No   Do you have housing?  Yes   Are you worried about losing your housing? No   Lack of transportation? No   Unable to get utilities (heat,electricity)? No         5/31/2024   Fall Risk   Fallen 2 or more times in the past year? No    No   Trouble with walking or balance? Yes    Yes   Gait Speed Test (Document in seconds) 3.94   Gait Speed Test Interpretation Less than or equal to 5.00 seconds - PASS          5/31/2024   Activities of Daily Living- Home Safety   Needs help with the following daily activites None of the above   Safety concerns in the home None of the above         5/31/2024   Dental   Dentist two times every year? Yes         5/31/2024   Hearing Screening   Hearing concerns? (!) I FEEL THAT PEOPLE ARE MUMBLING OR NOT SPEAKING CLEARLY.    (!) I NEED TO ASK PEOPLE TO SPEAK UP OR REPEAT THEMSELVES.    (!) IT'S HARDER TO UNDERSTAND WOMEN'S VOICES THAN MEN'S VOICES.    (!) IT'S HARD TO FOLLOW A CONVERSATION IN A NOISY RESTAURANT OR CROWDED ROOM.    (!) TROUBLE  UNDESTANDING A SPEAKER IN A PUBLIC MEETING OR [employer] SERVICE.    (!) TROUBLE UNDERSTANDING SOFT OR WHISPERED SPEECH.         5/31/2024   Driving Risk Screening   Patient/family members have concerns about driving No         5/31/2024   General Alertness/Fatigue Screening   Have you been more tired than usual lately? (!) YES         5/31/2024   Urinary Incontinence Screening   Bothered by leaking urine in past 6 months No         5/31/2024   TB Screening   Were you born outside of the [place]? No               5/31/2024   Substance Use   Alcohol more than 3/day or more than 7/wk No   Do you have a current opioid prescription? No   How severe/bad is pain from 1 to 10? 5/10   Do you use any other substances recreationally? No     Social History     Tobacco Use    Smoking status: Never    Smokeless tobacco: Never   Vaping Use    Vaping status: Never Used   Substance Use Topics    Alcohol use: Not Currently     Comment: zero use since 6/2018    Drug use: No                 Reviewed and updated as needed this visit by Provider        Surg Hx               Current providers sharing in care for this patient include:  Patient Care Team:  Guero Turner MD as PCP - General (Pediatrics)  Guero Turner MD as Assigned PCP    The following health maintenance items are reviewed in Epic and correct as of today:  Health Maintenance   Topic Date Due    MICROALBUMIN  05/25/2024    TSH W/FREE T4 REFLEX  05/25/2024    INFLUENZA VACCINE (Season Ended) 09/01/2024    BMP  12/22/2024    LIPID  12/22/2024    MEDICARE ANNUAL WELLNESS VISIT  05/31/2025    FALL RISK ASSESSMENT  05/31/2025    HEMOGLOBIN  05/31/2025    GLUCOSE  12/22/2026    ADVANCE CARE PLANNING  05/31/2029    DTAP/TDAP/TD IMMUNIZATION (3 - Td or Tdap) 08/23/2033    PHQ-2 (once per calendar year)  Completed    Pneumococcal Vaccine: 65+ Years  Completed    URINALYSIS  Completed    ZOSTER IMMUNIZATION  Completed    RSV VACCINE (Pregnancy & 60+)  Completed     "COVID-19 Vaccine  Completed    IPV IMMUNIZATION  Aged Out    HPV IMMUNIZATION  Aged Out    MENINGITIS IMMUNIZATION  Aged Out    RSV MONOCLONAL ANTIBODY  Aged Out    COLORECTAL CANCER SCREENING  Discontinued            Objective    Exam  /72   Pulse 108   Temp 97.4  F (36.3  C) (Tympanic)   Resp 16   Ht 1.727 m (5' 8\")   Wt 68.1 kg (150 lb 3.2 oz)   SpO2 96%   BMI 22.84 kg/m     Estimated body mass index is 22.84 kg/m  as calculated from the following:    Height as of this encounter: 1.727 m (5' 8\").    Weight as of this encounter: 68.1 kg (150 lb 3.2 oz).    Physical Exam  Gen: Alert, NAD.  HEENT: Tympanic membranes normal  Neck: No carotid bruits  CV: RRR no m/r/g  Pulm: CTAB, no w/r/r. No increased work of breathing  Msk: No LE edema  Neuro: Grossly intact  Skin: Scattered area over the left shoulder of erythematous papules with an erythematous base  Psychiatric: Normal affect and insight. Does not appear anxious or depressed.                5/31/2024   Mini Cog   Clock Draw Score 2 Normal   3 Item Recall 2 objects recalled   Mini Cog Total Score 4              Signed Electronically by: Guero Turner MD    "

## 2024-05-31 NOTE — NURSING NOTE
"Chief Complaint   Patient presents with    Medicare Visit     annual    Recheck Medication       Initial /72   Pulse 108   Temp 97.4  F (36.3  C) (Tympanic)   Resp 16   Ht 1.727 m (5' 8\")   Wt 68.1 kg (150 lb 3.2 oz)   SpO2 96%   BMI 22.84 kg/m   Estimated body mass index is 22.84 kg/m  as calculated from the following:    Height as of this encounter: 1.727 m (5' 8\").    Weight as of this encounter: 68.1 kg (150 lb 3.2 oz).  Medication Review: complete    The next two questions are to help us understand your food security.  If you are feeling you need any assistance in this area, we have resources available to support you today.          5/31/2024   SDOH- Food Insecurity   Within the past 12 months, did you worry that your food would run out before you got money to buy more? N   Within the past 12 months, did the food you bought just not last and you didn t have money to get more? N         Health Care Directive:  Patient has a Health Care Directive on file      Norma J. Gosselin, LPN      "

## 2024-07-26 NOTE — PROGRESS NOTES
"Post ERCP (9/23/19) with Dr. Traore: Follow-up     Post procedure recommendations:  -Repeat liver studies in one month at the time of plain film to ensure spontaneous passage of the pancreatic stent (anticipate normal numbers and and spontaneous ejection).   - With persistent elevation of numbers, EUS for liver biopsy (again will need to hold antithrombotics, and not anticipated).  - With retained stent, EGD on antithrombotics   - Antithrombotics may resume in 48h      Orders placed: Liver Studies, Xray DUE 10/23/19     Patient denies pain but is \"uncomfortable, but is feeling better Has not had any bowel movements as of yet.  Asked patient if experiencing any of the following symptoms:  Nausea: No  Vomiting: No   Fever: No  Abdominal pain: No    Routed to Angélica Cunningham RNCC for further review and triage: No    Clinic contact and scheduling numbers verified for future questions/concerns.     RUDY Barrios Dr., Dr. Traore, & Dr. Rivero  [facility]  [phone]    " [Appropriately responsive] : appropriately responsive [Alert] : alert [No Acute Distress] : no acute distress [Oriented x3] : oriented x3

## 2024-08-29 ENCOUNTER — TRANSFERRED RECORDS (OUTPATIENT)
Dept: HEALTH INFORMATION MANAGEMENT | Facility: OTHER | Age: 79
End: 2024-08-29
Payer: COMMERCIAL

## 2024-09-24 ENCOUNTER — TRANSFERRED RECORDS (OUTPATIENT)
Dept: HEALTH INFORMATION MANAGEMENT | Facility: OTHER | Age: 79
End: 2024-09-24
Payer: COMMERCIAL

## 2024-09-24 LAB — EJECTION FRACTION: =>55 %

## 2024-09-25 ENCOUNTER — ALLIED HEALTH/NURSE VISIT (OUTPATIENT)
Dept: FAMILY MEDICINE | Facility: OTHER | Age: 79
End: 2024-09-25
Attending: INTERNAL MEDICINE
Payer: MEDICARE

## 2024-09-25 DIAGNOSIS — Z23 NEED FOR COVID-19 VACCINE: ICD-10-CM

## 2024-09-25 DIAGNOSIS — Z23 NEED FOR PROPHYLACTIC VACCINATION AND INOCULATION AGAINST INFLUENZA: Primary | ICD-10-CM

## 2024-09-25 PROCEDURE — 90480 ADMN SARSCOV2 VAC 1/ONLY CMP: CPT

## 2024-09-25 PROCEDURE — 90662 IIV NO PRSV INCREASED AG IM: CPT

## 2024-09-25 NOTE — NURSING NOTE
"Chief Complaint   Patient presents with    Imm/Inj     Flu Shot       Initial There were no vitals taken for this visit. Estimated body mass index is 22.84 kg/m  as calculated from the following:    Height as of 5/31/24: 1.727 m (5' 8\").    Weight as of 5/31/24: 68.1 kg (150 lb 3.2 oz).  Medication Reconciliation: unable or not appropriate to perform    Rochelle Alvarez CMA      Immunization Documentation    Prior to Immunization administration, verified patients identity using patient's name and date of birth. Please see IMMUNIZATIONS  and order for additional information.  Patient / Parent instructed to remain in clinic for 15 minutes and report any adverse reaction to staff immediately.    Was the entire amount of vaccines given used? Yes    Rochelel Alvarez CMA  9/25/2024   10:40 AM    "

## 2024-12-04 ENCOUNTER — THERAPY VISIT (OUTPATIENT)
Dept: PHYSICAL THERAPY | Facility: OTHER | Age: 79
End: 2024-12-04
Attending: INTERNAL MEDICINE
Payer: MEDICARE

## 2024-12-04 DIAGNOSIS — M25.552 HIP PAIN, LEFT: ICD-10-CM

## 2024-12-04 DIAGNOSIS — M51.369 DEGENERATION OF INTERVERTEBRAL DISC OF LUMBAR REGION, UNSPECIFIED WHETHER PAIN PRESENT: ICD-10-CM

## 2024-12-04 DIAGNOSIS — M47.816 LUMBAR FACET ARTHROPATHY: ICD-10-CM

## 2024-12-04 DIAGNOSIS — M48.061 LUMBAR FORAMINAL STENOSIS: ICD-10-CM

## 2024-12-06 NOTE — PROGRESS NOTES
PHYSICAL THERAPY EVALUATION  Type of Visit: Evaluation              Subjective     Patient presents to physical therapy with bilateral lumbar spine pain and hip pain for many years.  He has been in PT in the past for similar issues.  He describes pain stiffness and weakness and aching especially after sitting for long period time or getting out of bed.  His right leg is stiff and sore in the morning.  He enjoys walking a lot doing some biking and doing exercises and stretches.  He also enjoys spectated end of local sports teams.  He has tried hot and cold for pain relief.  Patient reports pain between 2 and 8 out of 10 depending on activities and how long he has been sitting or lying down.        Presenting condition or subjective complaint: pain in right and left hip, and back  Date of onset:  (Patient reports many years of similar pain)    Relevant medical history: Arthritis; Dizziness; Hearing problems; Heart problems; High blood pressure; Implanted device; Kidney disease; Pain at night or rest; Thyroid problems   Dates & types of surgery: See chart please    Prior diagnostic imaging/testing results: Other not sure   Prior therapy history for the same diagnosis, illness or injury: Yes PT years ago    Prior Level of Function  Transfers:   Ambulation:   ADL:   IADL:     Living Environment  Social support: With a significant other or spouse   Type of home: House   Stairs to enter the home: No       Ramp: No   Stairs inside the home: No       Help at home: Home management tasks (cooking, cleaning)  Equipment owned:       Employment: Not Applicable    Hobbies/Interests: gardening, biking, walking, helping people    Patient goals for therapy: Move without pain    Pain assessment:      Objective   LUMBAR SPINE EVALUATION  PAIN:   INTEGUMENTARY (edema, incisions): WNL  POSTURE: WNL  GAIT:   Weightbearing Status:   Assistive Device(s):   Gait Deviations:   BALANCE/PROPRIOCEPTION: WFL  WEIGHTBEARING ALIGNMENT:  WFL  NON-WEIGHTBEARING ALIGNMENT: Has good pelvic alignment  ROM: Mild decreased extension tolerance of lumbar spine  PELVIC/SI SCREEN: Cleared  STRENGTH: 4of 5 hip abduction    MYOTOMES: WNL  DTR S: WNL  CORD SIGNS:   DERMATOMES: WNL  NEURAL TENSION: Mild tightness with sciatic nerve  FLEXIBILITY: Decreased tolerance of hip extension lumbar extension and quad mobility  LUMBAR/HIP Special Tests: No noted FRS or ERS however does have limited extension  PELVIS/SI SPECIAL TESTS: No noted torsion or upslip's or rotations  FUNCTIONAL TESTS:   PALPATION: Tenderness throughout lumbar spine and hips  SPINAL SEGMENTAL CONCLUSIONS:       Assessment & Plan   CLINICAL IMPRESSIONS  Medical Diagnosis: Lumbar foraminal stenosis (M48.061), Lumbar facet arthropathy (M47.816), Hip pain, left (M25.552), Degeneration of intervertebral disc of lumbar region, unspecified whether pain present (M51.369)    Treatment Diagnosis: Hip and back pain, lumbar radiculopathy, stenosis,   Impression/Assessment: Patient is a 79 year old male with lumbar/hip pain complaints.  The following significant findings have been identified: Pain, Decreased ROM/flexibility, Decreased joint mobility, Decreased strength, Impaired muscle performance, and Decreased activity tolerance. These impairments interfere with their ability to perform recreational activities, household mobility, and community mobility as compared to previous level of function.     Clinical Decision Making (Complexity):  Clinical Presentation: Stable/Uncomplicated  Clinical Presentation Rationale: based on medical and personal factors listed in PT evaluation  Clinical Decision Making (Complexity): Moderate complexity    PLAN OF CARE  Treatment Interventions:  Modalities: Cryotherapy, E-stim, Hot Pack, Mechanical Traction, Ultrasound  Interventions: Manual Therapy, Neuromuscular Re-education, Therapeutic Activity, Therapeutic Exercise, Aquatic Therapy    Long Term Goals     PT Goal 1  Goal  Identifier: Home exercise program  Goal Description: Patient will maintain home exercise program at least 2 times a week to improve mobility and strength of hips and core to allow him to better tolerates walking  Target Date: 02/26/25  PT Goal 2  Goal Identifier: Pain  Goal Description: Patient will have no more than 6 out of 10 pain with sleeping positions or sitting for long periods of time  Target Date: 02/26/25  PT Goal 3  Goal Identifier: Posture  Goal Description: Patient will understand sitting and lifting posture to better prevent stiffness and soreness from increasing to pain and is limiting his tolerance to activities  Target Date: 02/26/25      Frequency of Treatment: 1-2 times a week  Duration of Treatment: 12 weeks    Recommended Referrals to Other Professionals:   Education Assessment:        Risks and benefits of evaluation/treatment have been explained.   Patient/Family/caregiver agrees with Plan of Care.     Evaluation Time:     PT Eval, Moderate Complexity Minutes (75620): 30       Signing Clinician: Tayo Jimenez, PT        [facility]                                                                                   OUTPATIENT PHYSICAL THERAPY      PLAN OF TREATMENT FOR OUTPATIENT REHABILITATION   Patient's Last Name, First Name, Sandro Pittman YOB: 1945   Provider's Name   [facility]   Medical Record No.  [id number]     Onset Date:  (Patient reports many years of similar pain)  Start of Care Date: 12/04/24     Medical Diagnosis:  Lumbar foraminal stenosis (M48.061), Lumbar facet arthropathy (M47.816), Hip pain, left (M25.552), Degeneration of intervertebral disc of lumbar region, unspecified whether pain present (M51.369)      PT Treatment Diagnosis:  Hip and back pain, lumbar radiculopathy, stenosis, Plan of Treatment  Frequency/Duration: 1-2 times a week/ 12 weeks    Certification date from 12/04/24 to  02/26/25         See note for plan of treatment details and functional goals     Tayo Jimenez, PT                         I CERTIFY THE NEED FOR THESE SERVICES FURNISHED UNDER        THIS PLAN OF TREATMENT AND WHILE UNDER MY CARE     (Physician attestation of this document indicates review and certification of the therapy plan).              Referring Provider:  Guero Turner    Initial Assessment  See Epic Evaluation- Start of Care Date: 12/04/24

## 2024-12-10 ENCOUNTER — THERAPY VISIT (OUTPATIENT)
Dept: PHYSICAL THERAPY | Facility: OTHER | Age: 79
End: 2024-12-10
Attending: INTERNAL MEDICINE
Payer: COMMERCIAL

## 2024-12-10 DIAGNOSIS — M25.552 HIP PAIN, LEFT: ICD-10-CM

## 2024-12-10 DIAGNOSIS — M48.061 LUMBAR FORAMINAL STENOSIS: Primary | ICD-10-CM

## 2024-12-10 DIAGNOSIS — M47.816 LUMBAR FACET ARTHROPATHY: ICD-10-CM

## 2024-12-10 DIAGNOSIS — M51.369 DEGENERATION OF INTERVERTEBRAL DISC OF LUMBAR REGION, UNSPECIFIED WHETHER PAIN PRESENT: ICD-10-CM

## 2024-12-13 ENCOUNTER — THERAPY VISIT (OUTPATIENT)
Dept: PHYSICAL THERAPY | Facility: OTHER | Age: 79
End: 2024-12-13
Attending: INTERNAL MEDICINE
Payer: MEDICARE

## 2024-12-13 DIAGNOSIS — M25.552 HIP PAIN, LEFT: ICD-10-CM

## 2024-12-13 DIAGNOSIS — M47.816 LUMBAR FACET ARTHROPATHY: ICD-10-CM

## 2024-12-13 DIAGNOSIS — M48.061 LUMBAR FORAMINAL STENOSIS: Primary | ICD-10-CM

## 2024-12-16 ENCOUNTER — THERAPY VISIT (OUTPATIENT)
Dept: PHYSICAL THERAPY | Facility: OTHER | Age: 79
End: 2024-12-16
Attending: INTERNAL MEDICINE
Payer: COMMERCIAL

## 2024-12-16 DIAGNOSIS — M47.816 LUMBAR FACET ARTHROPATHY: ICD-10-CM

## 2024-12-16 DIAGNOSIS — M25.552 HIP PAIN, LEFT: ICD-10-CM

## 2024-12-16 DIAGNOSIS — M48.061 LUMBAR FORAMINAL STENOSIS: Primary | ICD-10-CM

## 2024-12-16 DIAGNOSIS — M51.369 DEGENERATION OF INTERVERTEBRAL DISC OF LUMBAR REGION, UNSPECIFIED WHETHER PAIN PRESENT: ICD-10-CM

## 2024-12-23 ENCOUNTER — THERAPY VISIT (OUTPATIENT)
Dept: PHYSICAL THERAPY | Facility: OTHER | Age: 79
End: 2024-12-23
Attending: INTERNAL MEDICINE
Payer: COMMERCIAL

## 2024-12-23 DIAGNOSIS — M25.552 HIP PAIN, LEFT: ICD-10-CM

## 2024-12-23 DIAGNOSIS — M51.362 DEGENERATION OF INTERVERTEBRAL DISC OF LUMBAR REGION WITH DISCOGENIC BACK PAIN AND LOWER EXTREMITY PAIN: ICD-10-CM

## 2024-12-23 DIAGNOSIS — M48.061 LUMBAR FORAMINAL STENOSIS: Primary | ICD-10-CM

## 2024-12-23 DIAGNOSIS — M47.816 LUMBAR FACET ARTHROPATHY: ICD-10-CM

## 2024-12-31 ENCOUNTER — THERAPY VISIT (OUTPATIENT)
Dept: PHYSICAL THERAPY | Facility: OTHER | Age: 79
End: 2024-12-31
Attending: INTERNAL MEDICINE
Payer: COMMERCIAL

## 2024-12-31 DIAGNOSIS — M48.061 LUMBAR FORAMINAL STENOSIS: Primary | ICD-10-CM

## 2024-12-31 DIAGNOSIS — M47.816 LUMBAR FACET ARTHROPATHY: ICD-10-CM

## 2024-12-31 DIAGNOSIS — M25.552 HIP PAIN, LEFT: ICD-10-CM

## 2024-12-31 DIAGNOSIS — M51.362 DEGENERATION OF INTERVERTEBRAL DISC OF LUMBAR REGION WITH DISCOGENIC BACK PAIN AND LOWER EXTREMITY PAIN: ICD-10-CM

## 2025-01-03 ENCOUNTER — THERAPY VISIT (OUTPATIENT)
Dept: PHYSICAL THERAPY | Facility: OTHER | Age: 80
End: 2025-01-03
Attending: INTERNAL MEDICINE
Payer: COMMERCIAL

## 2025-01-03 DIAGNOSIS — M47.816 LUMBAR FACET ARTHROPATHY: ICD-10-CM

## 2025-01-03 DIAGNOSIS — M25.552 HIP PAIN, LEFT: ICD-10-CM

## 2025-01-03 DIAGNOSIS — M48.061 LUMBAR FORAMINAL STENOSIS: Primary | ICD-10-CM

## 2025-01-03 PROCEDURE — 97140 MANUAL THERAPY 1/> REGIONS: CPT | Mod: GP | Performed by: PHYSICAL THERAPIST

## 2025-01-03 PROCEDURE — 97110 THERAPEUTIC EXERCISES: CPT | Mod: GP | Performed by: PHYSICAL THERAPIST

## 2025-01-09 ENCOUNTER — OFFICE VISIT (OUTPATIENT)
Dept: FAMILY MEDICINE | Facility: OTHER | Age: 80
End: 2025-01-09
Attending: FAMILY MEDICINE
Payer: COMMERCIAL

## 2025-01-09 VITALS
DIASTOLIC BLOOD PRESSURE: 68 MMHG | SYSTOLIC BLOOD PRESSURE: 122 MMHG | BODY MASS INDEX: 23.02 KG/M2 | WEIGHT: 151.4 LBS | HEART RATE: 91 BPM | OXYGEN SATURATION: 99 % | RESPIRATION RATE: 16 BRPM | TEMPERATURE: 97.3 F

## 2025-01-09 DIAGNOSIS — M47.816 LUMBAR FACET ARTHROPATHY: Primary | ICD-10-CM

## 2025-01-09 DIAGNOSIS — M51.362 DEGENERATION OF INTERVERTEBRAL DISC OF LUMBAR REGION WITH DISCOGENIC BACK PAIN AND LOWER EXTREMITY PAIN: ICD-10-CM

## 2025-01-09 PROCEDURE — G0463 HOSPITAL OUTPT CLINIC VISIT: HCPCS

## 2025-01-09 ASSESSMENT — ENCOUNTER SYMPTOMS
RESPIRATORY NEGATIVE: 1
CONSTITUTIONAL NEGATIVE: 1
CARDIOVASCULAR NEGATIVE: 1
BACK PAIN: 1

## 2025-01-09 ASSESSMENT — PAIN SCALES - GENERAL: PAINLEVEL_OUTOF10: MODERATE PAIN (4)

## 2025-01-09 NOTE — NURSING NOTE
"Chief Complaint   Patient presents with    Pain     Left hip and low back       Initial /68   Pulse 91   Temp 97.3  F (36.3  C) (Temporal)   Resp 16   Wt 68.7 kg (151 lb 6.4 oz)   SpO2 99%   BMI 23.02 kg/m   Estimated body mass index is 23.02 kg/m  as calculated from the following:    Height as of 5/31/24: 1.727 m (5' 8\").    Weight as of this encounter: 68.7 kg (151 lb 6.4 oz).  Medication Reconciliation: complete        "

## 2025-01-09 NOTE — PROGRESS NOTES
Assessment & Plan   Problem List Items Addressed This Visit          Musculoskeletal and Integumentary    Lumbar facet arthropathy - Primary    Relevant Orders    Physical Therapy  Referral    MR Lumbar Spine w/o Contrast    DDD (degenerative disc disease), lumbar    Relevant Orders    Physical Therapy  Referral    MR Lumbar Spine w/o Contrast         Diagnoses and all orders for this visit:  Lumbar facet arthropathy  -     Physical Therapy  Referral; Future  -     MR Lumbar Spine w/o Contrast; Future  Degeneration of intervertebral disc of lumbar region with discogenic back pain and lower extremity pain  -     Physical Therapy  Referral; Future  -     MR Lumbar Spine w/o Contrast; Future      I will repeat the MRI to see if there has been any interval change.  He states his pain is getting worse of late.  I will review up his physical therapy at this time.  I spent 35 minutes in face-to-face contact with this patient discussing this issue examining the patient and explaining radicular pain        Subjective   Sandro Jimenez A 79 year old male    Patient presents today complaining of left-sided iliotibial band pain.  He states this has been an ongoing issue.  Looking to his chart I see that he has degenerative disc disease and more of a radicular pain.  Last MRIs were in 2021.  He needs to have physical therapy reordered.  States his pain is a 4 out of 10.  It is an 8 out of 10 when he wakes up in gets out of bed.  He has been using Tylenol for the discomfort    Pain    History of Present Illness       Reason for visit:  Physical therapist requested update of x-rays of back and hips, and order for more physical therapy.    He eats 2-3 servings of fruits and vegetables daily.He consumes 0 sweetened beverage(s) daily.He exercises with enough effort to increase his heart rate 10 to 19 minutes per day.  He exercises with enough effort to increase his heart rate 4 days per  week.   He is taking medications regularly.    Past Medical History:   Diagnosis Date    Coronary artery disease     Hypertension     Hypothyroid     Stented coronary artery     x4    Transient global amnesia     hx of      reports that he has never smoked. He has never used smokeless tobacco. He reports that he does not currently use alcohol. He reports that he does not use drugs.  Family History   Problem Relation Age of Onset    Heart Disease Mother          at 94    Heart Disease Father     Prostate Cancer Father 80    Other - See Comments Brother         Sudden cardiac death    Heart Disease Brother     Prostate Cancer Brother 40    Other - See Comments Brother         CABG sdc young age    Prostate Cancer Brother 65    Colon Cancer No family hx of         Cancer-colon    Anesthesia Reaction No family hx of         Anesthesia Problem    Blood Disease No family hx of         Blood Disease,no bleeding or clotting     Allergies   Allergen Reactions    Co Q 10 [Coenzyme Q10] Other (See Comments)     Encephalopathy, transaminitis.    Hydrocodone-Acetaminophen Nausea and Vomiting    Thimerosal (Thiomersal) Swelling    Diatrizoate Rash    Plavix [Clopidogrel] Rash       Current Outpatient Medications:     acetaminophen (TYLENOL) 500 MG tablet, Take 500-1,000 mg by mouth every 6 hours as needed for mild pain, Disp: , Rfl:     atorvastatin (LIPITOR) 10 MG tablet, Take 1 tablet (10 mg) by mouth daily, Disp: 90 tablet, Rfl: 4    betamethasone dipropionate (DIPROSONE) 0.05 % external cream, Apply topically 2 times daily as needed STOP WHEN CLEAR. RESUME AS NEEDED, Disp: , Rfl:     BRILINTA 90 MG tablet, Take 90 mg by mouth 2 times daily, Disp: , Rfl: 2    diclofenac (VOLTAREN) 1 % topical gel, Apply topically 2 times daily, Disp: , Rfl:     fluticasone (FLONASE) 50 MCG/ACT nasal spray, Spray 2 sprays in nostril daily, Disp: 48 g, Rfl: 4    levothyroxine (SYNTHROID/LEVOTHROID) 88 MCG tablet, Take 1 tablet (88 mcg) by  mouth daily, Disp: 90 tablet, Rfl: 4    loratadine (CLARITIN) 10 MG tablet, Take 1 tablet (10 mg) by mouth daily as needed for allergies, Disp: 90 tablet, Rfl: 4    metoprolol succinate ER (TOPROL XL) 50 MG 24 hr tablet, Take 1 tablet (50 mg) by mouth daily, Disp: 90 tablet, Rfl: 4    nitroGLYcerin (NITROSTAT) 0.4 MG sublingual tablet, For chest pain place 1 tablet under the tongue every 5 minutes for 3 doses. If symptoms persist 5 minutes after 1st dose call 911., Disp: 25 tablet, Rfl: 3    ramipril (ALTACE) 2.5 MG capsule, Take 1 capsule (2.5 mg) by mouth daily, Disp: 90 capsule, Rfl: 4    fluorouracil (EFUDEX) 5 % external cream, Apply topically At Bedtime, Disp: , Rfl:   Review of Systems   Constitutional: Negative.    Respiratory: Negative.     Cardiovascular: Negative.    Musculoskeletal:  Positive for back pain.         Objective      /68   Pulse 91   Temp 97.3  F (36.3  C) (Temporal)   Resp 16   Wt 68.7 kg (151 lb 6.4 oz)   SpO2 99%   BMI 23.02 kg/m    Body mass index is 23.02 kg/m .  Physical Exam  Constitutional:       Appearance: Normal appearance.   Pulmonary:      Effort: Pulmonary effort is normal.   Musculoskeletal:      Comments: Left hip shows full range of motion.  Patient can flex and externally rotate the hip without any discomfort.   Skin:     General: Skin is warm and dry.      Capillary Refill: Capillary refill takes less than 2 seconds.   Neurological:      General: No focal deficit present.      Mental Status: He is alert and oriented to person, place, and time. Mental status is at baseline.      Comments: Leg raise is negative bilaterally.  Deep tendon reflexes are symmetric at 2 out of 4 for patellar and Achilles tendon.  Muscle strength is 4 out of 4 for hip flexion extension and flexion of the knee and dorsiflexion plantarflexion of bilateral feet.   Psychiatric:         Mood and Affect: Mood normal.         Behavior: Behavior normal.         Lab Results   Component Value  "Date    WBC 6.7 05/31/2024    HGB 15.8 05/31/2024     05/31/2024    CHOL 129 05/31/2024    TRIG 105 05/31/2024    HDL 56 05/31/2024    ALT 20 05/31/2024    AST 25 05/31/2024     05/31/2024    BUN 20.6 05/31/2024    CO2 27 05/31/2024    TSH 0.43 05/31/2024       Transferred Records on 09/24/2024   Component Date Value Ref Range Status    Ejection Fraction 09/24/2024 =>55  % Final         No results for input(s): \"TSH\", \"UA\", \"PSA\", \"HGBA1C\" in the last 336 hours.    Invalid input(s): \"CBC\", \"CMP\", \"LIPIDS\", \"BMP\", \"MICROALBU\"       "

## 2025-01-14 ENCOUNTER — THERAPY VISIT (OUTPATIENT)
Dept: PHYSICAL THERAPY | Facility: OTHER | Age: 80
End: 2025-01-14
Attending: INTERNAL MEDICINE
Payer: COMMERCIAL

## 2025-01-14 DIAGNOSIS — M47.816 LUMBAR FACET ARTHROPATHY: ICD-10-CM

## 2025-01-14 DIAGNOSIS — M25.552 HIP PAIN, LEFT: ICD-10-CM

## 2025-01-14 DIAGNOSIS — M48.061 LUMBAR FORAMINAL STENOSIS: Primary | ICD-10-CM

## 2025-01-14 DIAGNOSIS — M51.362 DEGENERATION OF INTERVERTEBRAL DISC OF LUMBAR REGION WITH DISCOGENIC BACK PAIN AND LOWER EXTREMITY PAIN: ICD-10-CM

## 2025-01-14 PROCEDURE — 97110 THERAPEUTIC EXERCISES: CPT | Mod: GP,CQ

## 2025-01-14 PROCEDURE — 97140 MANUAL THERAPY 1/> REGIONS: CPT | Mod: GP,CQ

## 2025-01-16 ENCOUNTER — THERAPY VISIT (OUTPATIENT)
Dept: PHYSICAL THERAPY | Facility: OTHER | Age: 80
End: 2025-01-16
Attending: INTERNAL MEDICINE
Payer: COMMERCIAL

## 2025-01-16 DIAGNOSIS — M25.552 HIP PAIN, LEFT: ICD-10-CM

## 2025-01-16 DIAGNOSIS — M47.816 LUMBAR FACET ARTHROPATHY: ICD-10-CM

## 2025-01-16 DIAGNOSIS — M48.061 LUMBAR FORAMINAL STENOSIS: Primary | ICD-10-CM

## 2025-01-16 DIAGNOSIS — M51.369 DDD (DEGENERATIVE DISC DISEASE), LUMBAR: ICD-10-CM

## 2025-01-16 PROCEDURE — 97110 THERAPEUTIC EXERCISES: CPT | Mod: GP | Performed by: PHYSICAL THERAPIST

## 2025-01-16 PROCEDURE — 97140 MANUAL THERAPY 1/> REGIONS: CPT | Mod: GP | Performed by: PHYSICAL THERAPIST

## 2025-01-16 NOTE — PROGRESS NOTES
PLAN  Continue therapy per current plan of care.    Beginning/End Dates of Progress Note Reporting Period:  12/4/24 to 01/16/2025    Referring Provider:  Guero Turner     01/16/25 0500   Appointment Info   Signing clinician's name / credentials Tayo Jimenez DPT   Total/Authorized Visits 10   Visits Used 10/10   Medical Diagnosis Lumbar foraminal stenosis (M48.061), Lumbar facet arthropathy (M47.816), Hip pain, left (M25.552), Degeneration of intervertebral disc of lumbar region, unspecified whether pain present (M51.369)   PT Tx Diagnosis Hip and back pain, lumbar radiculopathy, stenosis,   Progress Note/Certification   Start of Care Date 12/04/24   Onset of illness/injury or Date of Surgery   (Patient reports many years of similar pain)   Therapy Frequency 1-2 times a week   Predicted Duration 12 weeks   Certification date from 12/04/24   Certification date to 02/26/25   Progress Note Completed Date 01/16/25   GOALS   PT Goals 2;3   PT Goal 1   Goal Identifier Home exercise program   Goal Description Patient will maintain home exercise program at least 2 times a week to improve mobility and strength of hips and core to allow him to better tolerates walking   Goal Progress doing well with HEP   Target Date 02/26/25   PT Goal 2   Goal Identifier Pain   Goal Description Patient will have no more than 6 out of 10 pain with sleeping positions or sitting for long periods of time   Goal Progress sleeping has started to improve with evening and mornign stretches .   Target Date 02/26/25   PT Goal 3   Goal Identifier Posture   Goal Description Patient will understand sitting and lifting posture to better prevent stiffness and soreness from increasing to pain and is limiting his tolerance to activities   Goal Progress understands well.   Target Date 02/26/25   Subjective Report   Subjective Report had some pain mid sleep, did stretches in the night and pain was better this morning.   Treatment Interventions  (PT)   Interventions Therapeutic Procedure/Exercise;Manual Therapy   Therapeutic Procedure/Exercise   Therapeutic Procedures: strength, endurance, ROM, flexibility minutes (32728) 30   Ther Proc 1 stepper 5 min, leg press 180# x15, hip abd 50# 2x15, standing ham stretch, sidel chetna reverse clam and clam manual resisted x20 each. supine ham and prone quad stretch.   Ther Proc 1 - Details Discussion of his current home exercise program which includes walking around 1.5 miles per day, going on bike rides. gastroc stretches at the wall, patient introduced to lower trunk rotations, pelvic tilts, bridging, supine hamstring stretch, SKTC/piriformis stretch side-lying clams/hip abduction.   Skilled Intervention Indicated to improve mobility increase strength and decrease pain   Manual Therapy   Manual Therapy: Mobilization, MFR, MLD, friction massage minutes (18979) 10   Manual Therapy 1 - Details Prone soft tissue mobilization of paraspinals and hip IR and ER stretching and soft tissue mobilization along IT band and gluteus medius   Skilled Intervention Indicated to improve mobility and decrease pain   Plan   Home program Add lower trunk rotation crossover lower trunk rotation in hook lying piriformis stretch   Plan for next session Hip strengthening and hip stretching   Total Session Time   Timed Code Treatment Minutes 40   Total Treatment Time (sum of timed and untimed services) 40

## 2025-01-18 ENCOUNTER — HOSPITAL ENCOUNTER (OUTPATIENT)
Dept: MRI IMAGING | Facility: OTHER | Age: 80
Discharge: HOME | End: 2025-01-18
Attending: FAMILY MEDICINE | Admitting: FAMILY MEDICINE
Payer: COMMERCIAL

## 2025-01-18 DIAGNOSIS — M47.816 LUMBAR FACET ARTHROPATHY: ICD-10-CM

## 2025-01-18 DIAGNOSIS — M51.362 DEGENERATION OF INTERVERTEBRAL DISC OF LUMBAR REGION WITH DISCOGENIC BACK PAIN AND LOWER EXTREMITY PAIN: ICD-10-CM

## 2025-01-18 PROCEDURE — 72148 MRI LUMBAR SPINE W/O DYE: CPT

## 2025-01-28 ENCOUNTER — THERAPY VISIT (OUTPATIENT)
Dept: PHYSICAL THERAPY | Facility: OTHER | Age: 80
End: 2025-01-28
Attending: INTERNAL MEDICINE
Payer: COMMERCIAL

## 2025-01-28 DIAGNOSIS — M47.816 LUMBAR FACET ARTHROPATHY: ICD-10-CM

## 2025-01-28 DIAGNOSIS — M25.552 HIP PAIN, LEFT: ICD-10-CM

## 2025-01-28 DIAGNOSIS — M48.061 LUMBAR FORAMINAL STENOSIS: Primary | ICD-10-CM

## 2025-01-28 DIAGNOSIS — M51.362 DEGENERATION OF INTERVERTEBRAL DISC OF LUMBAR REGION WITH DISCOGENIC BACK PAIN AND LOWER EXTREMITY PAIN: ICD-10-CM

## 2025-01-28 PROCEDURE — 97140 MANUAL THERAPY 1/> REGIONS: CPT | Mod: GP

## 2025-01-28 PROCEDURE — 97110 THERAPEUTIC EXERCISES: CPT | Mod: GP

## 2025-02-05 ENCOUNTER — THERAPY VISIT (OUTPATIENT)
Dept: PHYSICAL THERAPY | Facility: OTHER | Age: 80
End: 2025-02-05
Attending: INTERNAL MEDICINE
Payer: COMMERCIAL

## 2025-02-05 DIAGNOSIS — M48.061 LUMBAR FORAMINAL STENOSIS: Primary | ICD-10-CM

## 2025-02-05 DIAGNOSIS — M47.816 LUMBAR FACET ARTHROPATHY: ICD-10-CM

## 2025-02-05 DIAGNOSIS — M25.552 HIP PAIN, LEFT: ICD-10-CM

## 2025-02-05 DIAGNOSIS — M51.362 DEGENERATION OF INTERVERTEBRAL DISC OF LUMBAR REGION WITH DISCOGENIC BACK PAIN AND LOWER EXTREMITY PAIN: ICD-10-CM

## 2025-02-05 PROCEDURE — 97110 THERAPEUTIC EXERCISES: CPT | Mod: GP | Performed by: PHYSICAL THERAPIST

## 2025-02-05 PROCEDURE — 97140 MANUAL THERAPY 1/> REGIONS: CPT | Mod: GP | Performed by: PHYSICAL THERAPIST

## 2025-02-12 ENCOUNTER — THERAPY VISIT (OUTPATIENT)
Dept: PHYSICAL THERAPY | Facility: OTHER | Age: 80
End: 2025-02-12
Attending: INTERNAL MEDICINE
Payer: COMMERCIAL

## 2025-02-12 DIAGNOSIS — M25.552 HIP PAIN, LEFT: ICD-10-CM

## 2025-02-12 DIAGNOSIS — M47.816 LUMBAR FACET ARTHROPATHY: ICD-10-CM

## 2025-02-12 DIAGNOSIS — M48.061 LUMBAR FORAMINAL STENOSIS: Primary | ICD-10-CM

## 2025-02-12 DIAGNOSIS — M51.362 DEGENERATION OF INTERVERTEBRAL DISC OF LUMBAR REGION WITH DISCOGENIC BACK PAIN AND LOWER EXTREMITY PAIN: ICD-10-CM

## 2025-02-12 PROCEDURE — 97110 THERAPEUTIC EXERCISES: CPT | Mod: GP | Performed by: PHYSICAL THERAPIST

## 2025-02-12 PROCEDURE — 97140 MANUAL THERAPY 1/> REGIONS: CPT | Mod: GP | Performed by: PHYSICAL THERAPIST

## 2025-02-18 ENCOUNTER — THERAPY VISIT (OUTPATIENT)
Dept: PHYSICAL THERAPY | Facility: OTHER | Age: 80
End: 2025-02-18
Attending: INTERNAL MEDICINE
Payer: COMMERCIAL

## 2025-02-18 DIAGNOSIS — M48.061 LUMBAR FORAMINAL STENOSIS: Primary | ICD-10-CM

## 2025-02-18 DIAGNOSIS — M47.816 LUMBAR FACET ARTHROPATHY: ICD-10-CM

## 2025-02-18 DIAGNOSIS — M51.362 DEGENERATION OF INTERVERTEBRAL DISC OF LUMBAR REGION WITH DISCOGENIC BACK PAIN AND LOWER EXTREMITY PAIN: ICD-10-CM

## 2025-02-18 DIAGNOSIS — M25.552 HIP PAIN, LEFT: ICD-10-CM

## 2025-02-18 PROCEDURE — 97110 THERAPEUTIC EXERCISES: CPT | Mod: GP | Performed by: PHYSICAL THERAPIST

## 2025-02-18 PROCEDURE — 97035 APP MDLTY 1+ULTRASOUND EA 15: CPT | Mod: GP | Performed by: PHYSICAL THERAPIST

## 2025-02-18 PROCEDURE — 97140 MANUAL THERAPY 1/> REGIONS: CPT | Mod: GP | Performed by: PHYSICAL THERAPIST

## 2025-02-20 ENCOUNTER — THERAPY VISIT (OUTPATIENT)
Dept: PHYSICAL THERAPY | Facility: OTHER | Age: 80
End: 2025-02-20
Attending: INTERNAL MEDICINE
Payer: COMMERCIAL

## 2025-02-20 DIAGNOSIS — M47.816 LUMBAR FACET ARTHROPATHY: ICD-10-CM

## 2025-02-20 DIAGNOSIS — M48.061 LUMBAR FORAMINAL STENOSIS: Primary | ICD-10-CM

## 2025-02-20 DIAGNOSIS — M25.552 HIP PAIN, LEFT: ICD-10-CM

## 2025-02-20 DIAGNOSIS — M51.362 DEGENERATION OF INTERVERTEBRAL DISC OF LUMBAR REGION WITH DISCOGENIC BACK PAIN AND LOWER EXTREMITY PAIN: ICD-10-CM

## 2025-02-20 PROCEDURE — 97140 MANUAL THERAPY 1/> REGIONS: CPT | Mod: GP | Performed by: PHYSICAL THERAPIST

## 2025-02-20 PROCEDURE — 97035 APP MDLTY 1+ULTRASOUND EA 15: CPT | Mod: GP | Performed by: PHYSICAL THERAPIST

## 2025-02-20 PROCEDURE — 97110 THERAPEUTIC EXERCISES: CPT | Mod: GP | Performed by: PHYSICAL THERAPIST

## 2025-02-26 ENCOUNTER — THERAPY VISIT (OUTPATIENT)
Dept: PHYSICAL THERAPY | Facility: OTHER | Age: 80
End: 2025-02-26
Attending: INTERNAL MEDICINE
Payer: COMMERCIAL

## 2025-02-26 DIAGNOSIS — M51.369 DEGENERATION OF INTERVERTEBRAL DISC OF LUMBAR REGION, UNSPECIFIED WHETHER PAIN PRESENT: ICD-10-CM

## 2025-02-26 DIAGNOSIS — M48.061 LUMBAR FORAMINAL STENOSIS: Primary | ICD-10-CM

## 2025-02-26 DIAGNOSIS — M25.552 HIP PAIN, LEFT: ICD-10-CM

## 2025-02-26 DIAGNOSIS — M47.816 LUMBAR FACET ARTHROPATHY: ICD-10-CM

## 2025-02-26 PROCEDURE — 97035 APP MDLTY 1+ULTRASOUND EA 15: CPT | Mod: GP | Performed by: PHYSICAL THERAPIST

## 2025-02-26 PROCEDURE — 97140 MANUAL THERAPY 1/> REGIONS: CPT | Mod: GP | Performed by: PHYSICAL THERAPIST

## 2025-02-26 PROCEDURE — 97110 THERAPEUTIC EXERCISES: CPT | Mod: GP | Performed by: PHYSICAL THERAPIST

## 2025-02-26 NOTE — PROGRESS NOTES
[facility]                                                                                   OUTPATIENT PHYSICAL THERAPY    PLAN OF TREATMENT FOR OUTPATIENT REHABILITATION   Patient's Last Name, First Name, Sandro Pittman YOB: 1945   Provider's Name   [facility]   Medical Record No.  [id number]     Onset Date:  (Patient reports many years of similar pain)  Start of Care Date: 12/04/24     Medical Diagnosis:  Lumbar foraminal stenosis (M48.061), Lumbar facet arthropathy (M47.816), Hip pain, left (M25.552), Degeneration of intervertebral disc of lumbar region, unspecified whether pain present (M51.369)      PT Treatment Diagnosis:  Hip and back pain, lumbar radiculopathy, stenosis, Plan of Treatment  Frequency/Duration: 1-2 times a week/ 12 weeks    Certification date from 02/26/25 to 04/23/25         See note for plan of treatment details and functional goals     Tayo Jimenez, PT                         I CERTIFY THE NEED FOR THESE SERVICES FURNISHED UNDER        THIS PLAN OF TREATMENT AND WHILE UNDER MY CARE     (Physician attestation of this document indicates review and certification of the therapy plan).              Referring Provider:  Guero Turner    Initial Assessment  See Epic Evaluation- Start of Care Date: 12/04/24            PLAN  Continue therapy per current plan of care.  Discuss hip injections    Beginning/End Dates of Progress Note Reporting Period:  1/16/25 to 02/26/2025    Referring Provider:  Guero Turner     02/26/25 0500   Appointment Info   Signing clinician's name / credentials Tayo Jimenez DPT   Total/Authorized Visits 19   Visits Used 8/10   Medical Diagnosis Lumbar foraminal stenosis (M48.061), Lumbar facet arthropathy (M47.816), Hip pain, left (M25.552), Degeneration of intervertebral disc of lumbar region, unspecified whether pain present (M51.369)   PT Tx Diagnosis Hip and  back pain, lumbar radiculopathy, stenosis,   Progress Note/Certification   Start of Care Date 12/04/24   Onset of illness/injury or Date of Surgery   (Patient reports many years of similar pain)   Therapy Frequency 1-2 times a week   Predicted Duration 12 weeks   Certification date from 02/26/25   Certification date to 04/23/25   Progress Note Completed Date 02/26/25   GOALS   PT Goals 2;3   PT Goal 1   Goal Identifier Home exercise program   Goal Description Patient will maintain home exercise program at least 2 times a week to improve mobility and strength of hips and core to allow him to better tolerates walking   Goal Progress Understands home exercise program importance   Target Date 02/26/25   PT Goal 2   Goal Identifier Pain   Goal Description Patient will have no more than 6 out of 10 pain with sleeping positions or sitting for long periods of time   Goal Progress Still getting soreness early morning pain on the sides   Target Date 02/26/25   PT Goal 3   Goal Identifier Posture   Goal Description Patient will understand sitting and lifting posture to better prevent stiffness and soreness from increasing to pain and is limiting his tolerance to activities   Goal Progress Understand lifting posture   Target Date 02/26/25   Subjective Report   Subjective Report Understands home exercise program and need to maintain stretches to help reduce strain on his hips tendonosis/itis   PT Modalities   PT Modalities Iontophoresis;Ultrasound   Ultrasound   Ultrasound Minutes (70429) 16   Treatment Detail Pulse 50% 3.3 MHz/cm  at lateral hip just distal to the greater trochanter of each hip.   Treatment Interventions (PT)   Interventions Therapeutic Procedure/Exercise;Manual Therapy   Therapeutic Procedure/Exercise   Therapeutic Procedures: strength, endurance, ROM, flexibility minutes (42680) 20   Ther Proc 1 Stepper 5 minutes, MedX hip abduction 60 pounds, started on chair hamstring stretch in standing, prone quad  stretch, lower trunk rotation x 10-15 each   Skilled Intervention Indicated to improve mobility increase strength and decrease pain   Manual Therapy   Manual Therapy: Mobilization, MFR, MLD, friction massage minutes (24628) 10   Manual Therapy 1 - Details side lying and prone STM/MFR paraspinals.   Plan   Home program Add lower trunk rotation crossover lower trunk rotation in hook lying piriformis stretch   Plan for next session injections?   Total Session Time   Timed Code Treatment Minutes 46   Total Treatment Time (sum of timed and untimed services) 46

## 2025-02-28 ENCOUNTER — THERAPY VISIT (OUTPATIENT)
Dept: PHYSICAL THERAPY | Facility: OTHER | Age: 80
End: 2025-02-28
Attending: INTERNAL MEDICINE
Payer: COMMERCIAL

## 2025-02-28 DIAGNOSIS — M47.816 LUMBAR FACET ARTHROPATHY: ICD-10-CM

## 2025-02-28 DIAGNOSIS — M48.061 LUMBAR FORAMINAL STENOSIS: Primary | ICD-10-CM

## 2025-02-28 DIAGNOSIS — M25.552 HIP PAIN, LEFT: ICD-10-CM

## 2025-02-28 DIAGNOSIS — M51.362 DEGENERATION OF INTERVERTEBRAL DISC OF LUMBAR REGION WITH DISCOGENIC BACK PAIN AND LOWER EXTREMITY PAIN: ICD-10-CM

## 2025-02-28 PROCEDURE — 97035 APP MDLTY 1+ULTRASOUND EA 15: CPT | Mod: GP | Performed by: PHYSICAL THERAPIST

## 2025-02-28 PROCEDURE — 97140 MANUAL THERAPY 1/> REGIONS: CPT | Mod: GP | Performed by: PHYSICAL THERAPIST

## 2025-02-28 PROCEDURE — 97110 THERAPEUTIC EXERCISES: CPT | Mod: GP | Performed by: PHYSICAL THERAPIST

## 2025-03-04 NOTE — PROGRESS NOTES
PLAN  Continue therapy per current plan of care.  Injections.     Beginning/End Dates of Progress Note Reporting Period:      Referring Provider:  Guero Turner     02/28/25 1300   Appointment Info   Signing clinician's name / credentials Tayo Jimenez DPT   Total/Authorized Visits 20   Visits Used 9/10   Medical Diagnosis Lumbar foraminal stenosis (M48.061), Lumbar facet arthropathy (M47.816), Hip pain, left (M25.552), Degeneration of intervertebral disc of lumbar region, unspecified whether pain present (M51.369)   PT Tx Diagnosis Hip and back pain, lumbar radiculopathy, stenosis,   Progress Note/Certification   Start of Care Date 12/04/24   Onset of illness/injury or Date of Surgery   (Patient reports many years of similar pain)   Therapy Frequency 1-2 times a week   Predicted Duration 12 weeks   Certification date from 02/26/25   Certification date to 04/23/25   Progress Note Completed Date 02/26/25   GOALS   PT Goals 2;3   PT Goal 1   Goal Identifier Home exercise program   Goal Description Patient will maintain home exercise program at least 2 times a week to improve mobility and strength of hips and core to allow him to better tolerates walking   Goal Progress Understands home exercise program importance   Target Date 02/26/25   PT Goal 2   Goal Identifier Pain   Goal Description Patient will have no more than 6 out of 10 pain with sleeping positions or sitting for long periods of time   Goal Progress less pain in the AM, and with walking.   Target Date 02/26/25   PT Goal 3   Goal Identifier Posture   Goal Description Patient will understand sitting and lifting posture to better prevent stiffness and soreness from increasing to pain and is limiting his tolerance to activities   Goal Progress Understand lifting posture   Target Date 02/26/25   Subjective Report   Subjective Report to [place] next week, doing better managing pain.   PT Modalities   PT Modalities Iontophoresis;Ultrasound    Ultrasound   Ultrasound Minutes (81454) 16   Treatment Detail Pulse 50% 3.3 MHz/cm  at lateral hip just distal to the greater trochanter of each hip. 8 min each.   Treatment Interventions (PT)   Interventions Therapeutic Procedure/Exercise;Manual Therapy   Therapeutic Procedure/Exercise   Therapeutic Procedures: strength, endurance, ROM, flexibility minutes (89916) 20   Ther Proc 1 stepper 5 min, unilat leg press 90#, hip abd 60#, lumbae ext 70#, LTR, supine ham,   Skilled Intervention Indicated to improve mobility increase strength and decrease pain   Manual Therapy   Manual Therapy: Mobilization, MFR, MLD, friction massage minutes (85027) 10   Manual Therapy 1 - Details side lying hip/glut and ITB. and prone STM/MFR paraspinals.   Plan   Home program Add lower trunk rotation crossover lower trunk rotation in hook lying piriformis stretch   Plan for next session injections?   Total Session Time   Timed Code Treatment Minutes 46   Total Treatment Time (sum of timed and untimed services) 46

## 2025-05-19 ENCOUNTER — OFFICE VISIT (OUTPATIENT)
Dept: FAMILY MEDICINE | Facility: OTHER | Age: 80
End: 2025-05-19
Attending: FAMILY MEDICINE
Payer: COMMERCIAL

## 2025-05-19 VITALS
BODY MASS INDEX: 22.49 KG/M2 | SYSTOLIC BLOOD PRESSURE: 77 MMHG | DIASTOLIC BLOOD PRESSURE: 54 MMHG | WEIGHT: 148.4 LBS | OXYGEN SATURATION: 97 % | HEIGHT: 68 IN | HEART RATE: 62 BPM | RESPIRATION RATE: 16 BRPM | TEMPERATURE: 98.3 F

## 2025-05-19 DIAGNOSIS — A05.9 FOOD POISONING DUE TO BACTERIA: Primary | ICD-10-CM

## 2025-05-19 PROCEDURE — 3078F DIAST BP <80 MM HG: CPT | Performed by: FAMILY MEDICINE

## 2025-05-19 PROCEDURE — 1125F AMNT PAIN NOTED PAIN PRSNT: CPT | Performed by: FAMILY MEDICINE

## 2025-05-19 PROCEDURE — 99213 OFFICE O/P EST LOW 20 MIN: CPT | Performed by: FAMILY MEDICINE

## 2025-05-19 PROCEDURE — 3074F SYST BP LT 130 MM HG: CPT | Performed by: FAMILY MEDICINE

## 2025-05-19 PROCEDURE — G0463 HOSPITAL OUTPT CLINIC VISIT: HCPCS

## 2025-05-19 RX ORDER — CIPROFLOXACIN 500 MG/1
500 TABLET, FILM COATED ORAL 2 TIMES DAILY
Qty: 14 TABLET | Refills: 0 | Status: SHIPPED | OUTPATIENT
Start: 2025-05-19 | End: 2025-05-26

## 2025-05-19 ASSESSMENT — ENCOUNTER SYMPTOMS
CARDIOVASCULAR NEGATIVE: 1
NAUSEA: 1
PSYCHIATRIC NEGATIVE: 1
MUSCULOSKELETAL NEGATIVE: 1
FEVER: 1
COUGH: 1
CHILLS: 1
DIARRHEA: 1
VOMITING: 0
EYES NEGATIVE: 1
ABDOMINAL PAIN: 1
HEMATOLOGIC/LYMPHATIC NEGATIVE: 1

## 2025-05-19 ASSESSMENT — PAIN SCALES - GENERAL: PAINLEVEL_OUTOF10: MODERATE PAIN (4)

## 2025-05-19 NOTE — PROGRESS NOTES
Assessment & Plan   Problem List Items Addressed This Visit    None  Visit Diagnoses         Food poisoning due to bacteria    -  Primary    Relevant Medications    ciprofloxacin (CIPRO) 500 MG tablet              Diagnoses and all orders for this visit:  Food poisoning due to bacteria  -     ciprofloxacin (CIPRO) 500 MG tablet; Take 1 tablet (500 mg) by mouth 2 times daily for 7 days.      Please suspect this is food poisoning.  I asked the patient to wait 24 to 48 hours before giving the prescription for Cipro.  If his diarrhea slows down that is a sign that he is resolving.  If he continues at its current pace then he should start the Cipro      Subjective   Sandro Jimenez A 80 year old male    Patient presents with complaints of having diarrhea.  He states that Saturday night he woke having diarrhea he states he had diarrhea every 20 minutes to 60 minutes for approximately 6-8 episodes.  He also states he has had increased flatulence.  He feels nauseous but has not had an emesis.  He states no fever but has had chills.  He also has had a cough with some phlegm.  No also states he has had some rib pain which started Saturday night with all the diarrhea.  Friday night he was at the [employer] and had a celebration of life where they had pulled chicken coleslaw and beans.  I had a style.    Cough  Associated symptoms include chills.   History of Present Illness       Reason for visit:  Lightheadedness and rib pain headache cough weakness  Symptom onset:  1-3 days ago  Symptom progression:  Worsening  Had these symptoms before:  Yes  Has tried/received treatment for these symptoms:  No  What makes it worse:  Chills  What makes it better:  Bed rest   He is taking medications regularly.    Past Medical History:   Diagnosis Date    Coronary artery disease     Hypertension     Hypothyroid     Stented coronary artery     x4    Transient global amnesia     hx of      reports that he has never smoked. He has  never used smokeless tobacco. He reports that he does not currently use alcohol. He reports that he does not use drugs.  Family History   Problem Relation Age of Onset    Heart Disease Mother          at 94    Heart Disease Father     Prostate Cancer Father 80    Other - See Comments Brother         Sudden cardiac death    Heart Disease Brother     Prostate Cancer Brother 40    Other - See Comments Brother         CABG sdc young age    Prostate Cancer Brother 65    Colon Cancer No family hx of         Cancer-colon    Anesthesia Reaction No family hx of         Anesthesia Problem    Blood Disease No family hx of         Blood Disease,no bleeding or clotting     Allergies   Allergen Reactions    Co Q 10 [Coenzyme Q10] Other (See Comments)     Encephalopathy, transaminitis.    Hydrocodone-Acetaminophen Nausea and Vomiting    Thimerosal (Thiomersal) Swelling    Diatrizoate Rash    Plavix [Clopidogrel] Rash       Current Outpatient Medications:     ciprofloxacin (CIPRO) 500 MG tablet, Take 1 tablet (500 mg) by mouth 2 times daily for 7 days., Disp: 14 tablet, Rfl: 0    acetaminophen (TYLENOL) 500 MG tablet, Take 500-1,000 mg by mouth every 6 hours as needed for mild pain, Disp: , Rfl:     atorvastatin (LIPITOR) 10 MG tablet, Take 1 tablet (10 mg) by mouth daily, Disp: 90 tablet, Rfl: 4    betamethasone dipropionate (DIPROSONE) 0.05 % external cream, Apply topically 2 times daily as needed STOP WHEN CLEAR. RESUME AS NEEDED, Disp: , Rfl:     BRILINTA 90 MG tablet, Take 90 mg by mouth 2 times daily, Disp: , Rfl: 2    diclofenac (VOLTAREN) 1 % topical gel, Apply topically 2 times daily, Disp: , Rfl:     fluticasone (FLONASE) 50 MCG/ACT nasal spray, Spray 2 sprays in nostril daily, Disp: 48 g, Rfl: 4    levothyroxine (SYNTHROID/LEVOTHROID) 88 MCG tablet, Take 1 tablet (88 mcg) by mouth daily, Disp: 90 tablet, Rfl: 4    loratadine (CLARITIN) 10 MG tablet, Take 1 tablet (10 mg) by mouth daily as needed for allergies, Disp:  "90 tablet, Rfl: 4    metoprolol succinate ER (TOPROL XL) 50 MG 24 hr tablet, Take 1 tablet (50 mg) by mouth daily, Disp: 90 tablet, Rfl: 4    nitroGLYcerin (NITROSTAT) 0.4 MG sublingual tablet, For chest pain place 1 tablet under the tongue every 5 minutes for 3 doses. If symptoms persist 5 minutes after 1st dose call 911., Disp: 25 tablet, Rfl: 3    ramipril (ALTACE) 2.5 MG capsule, Take 1 capsule (2.5 mg) by mouth daily, Disp: 90 capsule, Rfl: 4  Review of Systems   Constitutional:  Positive for chills and fever.   HENT: Negative.     Eyes: Negative.    Respiratory:  Positive for cough.    Cardiovascular: Negative.    Gastrointestinal:  Positive for abdominal pain, diarrhea and nausea. Negative for vomiting.   Genitourinary: Negative.    Musculoskeletal: Negative.    Skin: Negative.    Hematological: Negative.    Psychiatric/Behavioral: Negative.           Objective      BP (!) 77/54 (BP Location: Right arm, Patient Position: Sitting, Cuff Size: Adult Large)   Pulse 62   Temp 98.3  F (36.8  C) (Temporal)   Resp 16   Ht 1.727 m (5' 8\")   Wt 67.3 kg (148 lb 6.4 oz)   SpO2 97%   BMI 22.56 kg/m    Body mass index is 22.56 kg/m .  Physical Exam  Constitutional:       Appearance: Normal appearance.   HENT:      Head: Normocephalic.      Right Ear: Tympanic membrane and ear canal normal.      Left Ear: Tympanic membrane and ear canal normal.      Nose: Nose normal.      Mouth/Throat:      Mouth: Mucous membranes are moist.      Pharynx: Oropharynx is clear.   Eyes:      Conjunctiva/sclera: Conjunctivae normal.   Cardiovascular:      Rate and Rhythm: Normal rate and regular rhythm.      Heart sounds: Normal heart sounds.   Pulmonary:      Effort: Pulmonary effort is normal.      Breath sounds: Normal breath sounds.   Abdominal:      General: Abdomen is flat. Bowel sounds are normal.      Palpations: Abdomen is soft.      Tenderness: There is abdominal tenderness (Epigastric discomfort).   Musculoskeletal:         " "General: Normal range of motion.      Cervical back: Neck supple.   Skin:     General: Skin is warm and dry.   Neurological:      General: No focal deficit present.      Mental Status: He is alert and oriented to person, place, and time. Mental status is at baseline.   Psychiatric:         Mood and Affect: Mood normal.         Behavior: Behavior normal.         Lab Results   Component Value Date    WBC 6.7 05/31/2024    HGB 15.8 05/31/2024     05/31/2024    CHOL 129 05/31/2024    TRIG 105 05/31/2024    HDL 56 05/31/2024    ALT 20 05/31/2024    AST 25 05/31/2024     05/31/2024    BUN 20.6 05/31/2024    CO2 27 05/31/2024    TSH 0.43 05/31/2024       Transferred Records on 09/24/2024   Component Date Value Ref Range Status    Ejection Fraction 09/24/2024 =>55  % Final         No results for input(s): \"TSH\", \"UA\", \"PSA\", \"HGBA1C\" in the last 336 hours.    Invalid input(s): \"CBC\", \"CMP\", \"LIPIDS\", \"BMP\", \"MICROALBU\"         "

## 2025-05-19 NOTE — NURSING NOTE
"Chief Complaint   Patient presents with    Dizziness    Cough       Initial BP (!) 77/54 (BP Location: Right arm, Patient Position: Sitting, Cuff Size: Adult Large)   Pulse 62   Temp 98.3  F (36.8  C) (Temporal)   Resp 16   Ht 1.727 m (5' 8\")   Wt 67.3 kg (148 lb 6.4 oz)   SpO2 97%   BMI 22.56 kg/m   Estimated body mass index is 22.56 kg/m  as calculated from the following:    Height as of this encounter: 1.727 m (5' 8\").    Weight as of this encounter: 67.3 kg (148 lb 6.4 oz).  Medication Review: complete    The next two questions are to help us understand your food security.  If you are feeling you need any assistance in this area, we have resources available to support you today.          5/31/2024   SDOH- Food Insecurity   Within the past 12 months, did you worry that your food would run out before you got money to buy more? N   Within the past 12 months, did the food you bought just not last and you didn t have money to get more? N        Data saved with a previous flowsheet row definition         Health Care Directive:  Patient has a Health Care Directive on file      Beronica Hirsch      "

## 2025-06-02 ENCOUNTER — RESULTS FOLLOW-UP (OUTPATIENT)
Dept: PEDIATRICS | Facility: OTHER | Age: 80
End: 2025-06-02

## 2025-06-02 ENCOUNTER — OFFICE VISIT (OUTPATIENT)
Dept: PEDIATRICS | Facility: OTHER | Age: 80
End: 2025-06-02
Attending: INTERNAL MEDICINE
Payer: COMMERCIAL

## 2025-06-02 ENCOUNTER — APPOINTMENT (OUTPATIENT)
Dept: LAB | Facility: OTHER | Age: 80
End: 2025-06-02
Attending: INTERNAL MEDICINE
Payer: COMMERCIAL

## 2025-06-02 VITALS
OXYGEN SATURATION: 98 % | TEMPERATURE: 97.7 F | SYSTOLIC BLOOD PRESSURE: 100 MMHG | DIASTOLIC BLOOD PRESSURE: 68 MMHG | HEIGHT: 68 IN | WEIGHT: 146.6 LBS | RESPIRATION RATE: 16 BRPM | HEART RATE: 84 BPM | BODY MASS INDEX: 22.22 KG/M2

## 2025-06-02 DIAGNOSIS — Z00.00 ENCOUNTER FOR MEDICARE ANNUAL WELLNESS EXAM: Primary | ICD-10-CM

## 2025-06-02 DIAGNOSIS — I10 ESSENTIAL HYPERTENSION: ICD-10-CM

## 2025-06-02 DIAGNOSIS — D75.89 MACROCYTOSIS: ICD-10-CM

## 2025-06-02 DIAGNOSIS — M43.06 SPONDYLOLYSIS OF LUMBAR REGION: ICD-10-CM

## 2025-06-02 DIAGNOSIS — M51.362 DEGENERATION OF INTERVERTEBRAL DISC OF LUMBAR REGION WITH DISCOGENIC BACK PAIN AND LOWER EXTREMITY PAIN: ICD-10-CM

## 2025-06-02 DIAGNOSIS — R21 ATYPICAL RASH: ICD-10-CM

## 2025-06-02 DIAGNOSIS — J30.9 ALLERGIC RHINITIS, UNSPECIFIED SEASONALITY, UNSPECIFIED TRIGGER: ICD-10-CM

## 2025-06-02 DIAGNOSIS — E78.2 MIXED HYPERLIPIDEMIA: ICD-10-CM

## 2025-06-02 DIAGNOSIS — E03.9 ACQUIRED HYPOTHYROIDISM: ICD-10-CM

## 2025-06-02 DIAGNOSIS — D49.0 IPMN (INTRADUCTAL PAPILLARY MUCINOUS NEOPLASM): Chronic | ICD-10-CM

## 2025-06-02 DIAGNOSIS — R73.03 PRE-DIABETES: Chronic | ICD-10-CM

## 2025-06-02 DIAGNOSIS — Z13.6 SCREENING FOR CARDIOVASCULAR CONDITION: ICD-10-CM

## 2025-06-02 DIAGNOSIS — N18.31 STAGE 3A CHRONIC KIDNEY DISEASE (H): ICD-10-CM

## 2025-06-02 DIAGNOSIS — M47.816 LUMBAR FACET ARTHROPATHY: ICD-10-CM

## 2025-06-02 DIAGNOSIS — I25.118 CORONARY ARTERY DISEASE OF NATIVE ARTERY OF NATIVE HEART WITH STABLE ANGINA PECTORIS: ICD-10-CM

## 2025-06-02 DIAGNOSIS — M48.061 LUMBAR FORAMINAL STENOSIS: ICD-10-CM

## 2025-06-02 DIAGNOSIS — I20.89 ATYPICAL ANGINA: ICD-10-CM

## 2025-06-02 DIAGNOSIS — I25.10 ASCVD (ARTERIOSCLEROTIC CARDIOVASCULAR DISEASE): ICD-10-CM

## 2025-06-02 LAB
ANION GAP SERPL CALCULATED.3IONS-SCNC: 11 MMOL/L (ref 7–15)
BASOPHILS # BLD AUTO: 0 10E3/UL (ref 0–0.2)
BASOPHILS NFR BLD AUTO: 1 %
BUN SERPL-MCNC: 24.3 MG/DL (ref 8–23)
CALCIUM SERPL-MCNC: 9.9 MG/DL (ref 8.8–10.4)
CHLORIDE SERPL-SCNC: 105 MMOL/L (ref 98–107)
CHOLEST SERPL-MCNC: 132 MG/DL
CREAT SERPL-MCNC: 1.26 MG/DL (ref 0.67–1.17)
CREAT UR-MCNC: 331.8 MG/DL
EGFRCR SERPLBLD CKD-EPI 2021: 57 ML/MIN/1.73M2
EOSINOPHIL # BLD AUTO: 0.2 10E3/UL (ref 0–0.7)
EOSINOPHIL NFR BLD AUTO: 3 %
ERYTHROCYTE [DISTWIDTH] IN BLOOD BY AUTOMATED COUNT: 13.1 % (ref 10–15)
EST. AVERAGE GLUCOSE BLD GHB EST-MCNC: 128 MG/DL
FASTING STATUS PATIENT QL REPORTED: YES
FASTING STATUS PATIENT QL REPORTED: YES
FOLATE SERPL-MCNC: 16.2 NG/ML (ref 4.6–34.8)
GLUCOSE SERPL-MCNC: 108 MG/DL (ref 70–99)
HBA1C MFR BLD: 6.1 %
HCO3 SERPL-SCNC: 25 MMOL/L (ref 22–29)
HCT VFR BLD AUTO: 46.3 % (ref 40–53)
HDLC SERPL-MCNC: 54 MG/DL
HGB BLD-MCNC: 15.3 G/DL (ref 13.3–17.7)
IMM GRANULOCYTES # BLD: 0 10E3/UL
IMM GRANULOCYTES NFR BLD: 0 %
LDLC SERPL CALC-MCNC: 63 MG/DL
LYMPHOCYTES # BLD AUTO: 1.7 10E3/UL (ref 0.8–5.3)
LYMPHOCYTES NFR BLD AUTO: 22 %
MCH RBC QN AUTO: 32.8 PG (ref 26.5–33)
MCHC RBC AUTO-ENTMCNC: 33 G/DL (ref 31.5–36.5)
MCV RBC AUTO: 99 FL (ref 78–100)
MICROALBUMIN UR-MCNC: <12 MG/L
MICROALBUMIN/CREAT UR: NORMAL MG/G{CREAT}
MONOCYTES # BLD AUTO: 0.8 10E3/UL (ref 0–1.3)
MONOCYTES NFR BLD AUTO: 10 %
NEUTROPHILS # BLD AUTO: 4.9 10E3/UL (ref 1.6–8.3)
NEUTROPHILS NFR BLD AUTO: 64 %
NONHDLC SERPL-MCNC: 78 MG/DL
NRBC # BLD AUTO: 0 10E3/UL
NRBC BLD AUTO-RTO: 0 /100
PLATELET # BLD AUTO: 330 10E3/UL (ref 150–450)
POTASSIUM SERPL-SCNC: 3.8 MMOL/L (ref 3.4–5.3)
RBC # BLD AUTO: 4.67 10E6/UL (ref 4.4–5.9)
RETICS # AUTO: 0.06 10E6/UL (ref 0.03–0.1)
RETICS/RBC NFR AUTO: 1.3 % (ref 0.5–2)
SODIUM SERPL-SCNC: 141 MMOL/L (ref 135–145)
TRIGL SERPL-MCNC: 76 MG/DL
TSH SERPL DL<=0.005 MIU/L-ACNC: 0.45 UIU/ML (ref 0.3–4.2)
VIT B12 SERPL-MCNC: 802 PG/ML (ref 232–1245)
WBC # BLD AUTO: 7.7 10E3/UL (ref 4–11)

## 2025-06-02 PROCEDURE — 86618 LYME DISEASE ANTIBODY: CPT | Mod: ZL | Performed by: INTERNAL MEDICINE

## 2025-06-02 PROCEDURE — 85025 COMPLETE CBC W/AUTO DIFF WBC: CPT | Mod: ZL | Performed by: INTERNAL MEDICINE

## 2025-06-02 PROCEDURE — 83036 HEMOGLOBIN GLYCOSYLATED A1C: CPT | Mod: ZL | Performed by: INTERNAL MEDICINE

## 2025-06-02 PROCEDURE — 82607 VITAMIN B-12: CPT | Mod: ZL | Performed by: INTERNAL MEDICINE

## 2025-06-02 PROCEDURE — 80061 LIPID PANEL: CPT | Mod: ZL | Performed by: INTERNAL MEDICINE

## 2025-06-02 PROCEDURE — G0463 HOSPITAL OUTPT CLINIC VISIT: HCPCS

## 2025-06-02 PROCEDURE — 82746 ASSAY OF FOLIC ACID SERUM: CPT | Mod: ZL | Performed by: INTERNAL MEDICINE

## 2025-06-02 PROCEDURE — 91320 SARSCV2 VAC 30MCG TRS-SUC IM: CPT

## 2025-06-02 PROCEDURE — 84443 ASSAY THYROID STIM HORMONE: CPT | Mod: ZL | Performed by: INTERNAL MEDICINE

## 2025-06-02 PROCEDURE — 36415 COLL VENOUS BLD VENIPUNCTURE: CPT | Mod: ZL | Performed by: INTERNAL MEDICINE

## 2025-06-02 PROCEDURE — 80048 BASIC METABOLIC PNL TOTAL CA: CPT | Mod: ZL | Performed by: INTERNAL MEDICINE

## 2025-06-02 PROCEDURE — 82570 ASSAY OF URINE CREATININE: CPT | Mod: ZL | Performed by: INTERNAL MEDICINE

## 2025-06-02 PROCEDURE — 85045 AUTOMATED RETICULOCYTE COUNT: CPT | Mod: ZL | Performed by: INTERNAL MEDICINE

## 2025-06-02 RX ORDER — RAMIPRIL 2.5 MG/1
2.5 CAPSULE ORAL DAILY
Qty: 90 CAPSULE | Refills: 4 | Status: SHIPPED | OUTPATIENT
Start: 2025-06-02

## 2025-06-02 RX ORDER — FLUTICASONE PROPIONATE 50 MCG
2 SPRAY, SUSPENSION (ML) NASAL DAILY
Qty: 48 G | Refills: 4 | Status: SHIPPED | OUTPATIENT
Start: 2025-06-02

## 2025-06-02 RX ORDER — ATORVASTATIN CALCIUM 10 MG/1
10 TABLET, FILM COATED ORAL DAILY
Qty: 90 TABLET | Refills: 4 | Status: SHIPPED | OUTPATIENT
Start: 2025-06-02

## 2025-06-02 RX ORDER — NITROGLYCERIN 0.4 MG/1
TABLET SUBLINGUAL
Qty: 25 TABLET | Refills: 3 | Status: SHIPPED | OUTPATIENT
Start: 2025-06-02

## 2025-06-02 RX ORDER — LEVOTHYROXINE SODIUM 88 UG/1
88 TABLET ORAL DAILY
Qty: 90 TABLET | Refills: 4 | Status: SHIPPED | OUTPATIENT
Start: 2025-06-02

## 2025-06-02 RX ORDER — METOPROLOL SUCCINATE 50 MG/1
50 TABLET, EXTENDED RELEASE ORAL DAILY
Qty: 90 TABLET | Refills: 4 | Status: CANCELLED | OUTPATIENT
Start: 2025-06-02

## 2025-06-02 RX ORDER — METOPROLOL SUCCINATE 25 MG/1
25 TABLET, EXTENDED RELEASE ORAL DAILY
Qty: 90 TABLET | Refills: 4 | Status: SHIPPED | OUTPATIENT
Start: 2025-06-02

## 2025-06-02 SDOH — HEALTH STABILITY: PHYSICAL HEALTH: ON AVERAGE, HOW MANY MINUTES DO YOU ENGAGE IN EXERCISE AT THIS LEVEL?: 20 MIN

## 2025-06-02 SDOH — HEALTH STABILITY: PHYSICAL HEALTH: ON AVERAGE, HOW MANY DAYS PER WEEK DO YOU ENGAGE IN MODERATE TO STRENUOUS EXERCISE (LIKE A BRISK WALK)?: 3 DAYS

## 2025-06-02 ASSESSMENT — SOCIAL DETERMINANTS OF HEALTH (SDOH): HOW OFTEN DO YOU GET TOGETHER WITH FRIENDS OR RELATIVES?: TWICE A WEEK

## 2025-06-02 ASSESSMENT — PAIN SCALES - GENERAL: PAINLEVEL_OUTOF10: NO PAIN (0)

## 2025-06-02 NOTE — NURSING NOTE
"Chief Complaint   Patient presents with    Medicare Visit     annual    Recheck Medication       Initial /68   Pulse 84   Temp 97.7  F (36.5  C) (Tympanic)   Resp 16   Ht 1.715 m (5' 7.5\")   Wt 66.5 kg (146 lb 9.6 oz)   SpO2 98%   BMI 22.62 kg/m   Estimated body mass index is 22.62 kg/m  as calculated from the following:    Height as of this encounter: 1.715 m (5' 7.5\").    Weight as of this encounter: 66.5 kg (146 lb 9.6 oz).  Medication Review: complete    The next two questions are to help us understand your food security.  If you are feeling you need any assistance in this area, we have resources available to support you today.          6/2/2025   SDOH- Food Insecurity   Within the past 12 months, did you worry that your food would run out before you got money to buy more? N   Within the past 12 months, did the food you bought just not last and you didn t have money to get more? N         Health Care Directive:  Patient has a Health Care Directive on file      Norma J. Gosselin, LPN      "

## 2025-06-02 NOTE — PATIENT INSTRUCTIONS
-- Consider buprenorphine   -- Try yoga/alex chi     -- Reduce metoprolol   -- Talk to Cardiology about SGLT2 drugs    Find Local Classes   * Preventing falls   * Preventing and managing diabetes   * Managing chronic conditions and pain   * Mental Health    Examples of classes:   -- Diabetes prevention program (Pre-Diabetes or at risk for diabetes)   -- Living well with Diabetes   -- Living well with Chronic Conditions   -- Living well with Chronic Pain   -- Stay Active and Independent for Life (SAIL)   -- Alex Ji Erlin: Moving for better balance   -- Older Adult Mental Health First Aid   -- Walk with ease free walking program    How do I sign up?  Stop by the [facility] check-out desk for a [facility] referral  Call [facility] [phone]  Contact Aliyah directly via: [email or web address] or [phone]    Patient Education   Preventive Care Advice   This is general advice given by our system to help you stay healthy. However, your care team may have specific advice just for you. Please talk to your care team about your preventive care needs.  Nutrition  Eat 5 or more servings of fruits and vegetables each day.  Try wheat bread, brown rice and whole grain pasta (instead of white bread, rice, and pasta).  Get enough calcium and vitamin D. Check the label on foods and aim for 100% of the RDA (recommended daily allowance).  Lifestyle  Exercise at least 150 minutes each week  (30 minutes a day, 5 days a week).  Do muscle strengthening activities 2 days a week. These help control your weight and prevent disease.  No smoking.  Wear sunscreen to prevent skin cancer.  Have a dental exam and cleaning every 6 months.  Yearly exams  See your health care team every year to talk about:  Any changes in your health.  Any medicines your care team has prescribed.  Preventive care, family planning, and ways to prevent chronic diseases.  Shots (vaccines)   HPV shots (up to age 26), if you've never had them before.  Hepatitis B  shots (up to age 59), if you've never had them before.  COVID-19 shot: Get this shot when it's due.  Flu shot: Get a flu shot every year.  Tetanus shot: Get a tetanus shot every 10 years.  Pneumococcal, hepatitis A, and RSV shots: Ask your care team if you need these based on your risk.  Shingles shot (for age 50 and up)  General health tests  Diabetes screening:  Starting at age 35, Get screened for diabetes at least every 3 years.  If you are younger than age 35, ask your care team if you should be screened for diabetes.  Cholesterol test: At age 39, start having a cholesterol test every 5 years, or more often if advised.  Bone density scan (DEXA): At age 50, ask your care team if you should have this scan for osteoporosis (brittle bones).  Hepatitis C: Get tested at least once in your life.  STIs (sexually transmitted infections)  Before age 24: Ask your care team if you should be screened for STIs.  After age 24: Get screened for STIs if you're at risk. You are at risk for STIs (including HIV) if:  You are sexually active with more than one person.  You don't use condoms every time.  You or a partner was diagnosed with a sexually transmitted infection.  If you are at risk for HIV, ask about PrEP medicine to prevent HIV.  Get tested for HIV at least once in your life, whether you are at risk for HIV or not.  Cancer screening tests  Cervical cancer screening: If you have a cervix, begin getting regular cervical cancer screening tests starting at age 21.  Breast cancer scan (mammogram): If you've ever had breasts, begin having regular mammograms starting at age 40. This is a scan to check for breast cancer.  Colon cancer screening: It is important to start screening for colon cancer at age 45.  Have a colonoscopy test every 10 years (or more often if you're at risk) Or, ask your provider about stool tests like a FIT test every year or Cologuard test every 3 years.  To learn more about your testing options, visit:    .  For help making a decision, visit:   https://bit.ly/ki24250.  Prostate cancer screening test: If you have a prostate, ask your care team if a prostate cancer screening test (PSA) at age 55 is right for you.  Lung cancer screening: If you are a current or former smoker ages 50 to 80, ask your care team if ongoing lung cancer screenings are right for you.  For informational purposes only. Not to replace the advice of your health care provider. Copyright   2023 [employer]. All rights reserved. Clinically reviewed by the  [facility]. [employer] 445822 - REV 01/24.  Learning About Activities of Daily Living  What are activities of daily living?     Activities of daily living (ADLs) are the basic self-care tasks you do every day. These include eating, bathing, dressing, and moving around.  As you age, and if you have health problems, you may find that it's harder to do some of these tasks. If so, your doctor can suggest ideas that may help.  To measure what kind of help you may need, your doctor will ask how well you are able to do ADLs. Let your doctor know if there are any tasks that you are having trouble doing. This is an important first step to getting help. And when you have the help you need, you can stay as independent as possible.  How will a doctor assess your ADLs?  Asking about ADLs is part of a routine health checkup your doctor will likely do as you age. Your health check might be done in a doctor's office, in your home, or at a hospital. The goal is to find out if you are having any problems that could make it hard to care for yourself or that make it unsafe for you to be on your own.  To measure your ADLs, your doctor will ask how hard it is for you to do routine tasks. Your doctor may also want to know if you have changed the way you do a task because of a health problem. Your doctor may watch how you:  Walk back and forth.  Keep your balance while you stand or  walk.  Move from sitting to standing or from a bed to a chair.  Button or unbutton a shirt or sweater.  Remove and put on your shoes.  It's common to feel a little worried or anxious if you find you can't do all the things you used to be able to do. Talking with your doctor about ADLs is a way to make sure you're as safe as possible and able to care for yourself as well as you can. You may want to bring a caregiver, friend, or family member to your checkup. They can help you talk to your doctor.  Follow-up care is a key part of your treatment and safety. Be sure to make and go to all appointments, and call your doctor if you are having problems. It's also a good idea to know your test results and keep a list of the medicines you take.  Current as of: October 24, 2024  Content Version: 14.4    6058-6203 [employer].   Care instructions adapted under license by your healthcare professional. If you have questions about a medical condition or this instruction, always ask your healthcare professional. [employer] disclaims any warranty or liability for your use of this information.    Hearing Loss: Care Instructions  Overview     Hearing loss is a sudden or slow decrease in how well you hear. It can range from slight to profound. Permanent hearing loss can occur with aging. It also can happen when you are exposed long-term to loud noise. Examples include listening to loud music, riding motorcycles, or being around other loud machines.  Hearing loss can affect your work and home life. It can make you feel lonely or depressed. You may feel that you have lost your independence. But hearing aids and other devices can help you hear better and feel connected to others.  Follow-up care is a key part of your treatment and safety. Be sure to make and go to all appointments, and call your doctor if you are having problems. It's also a good idea to know your test results and keep a list of the medicines you  take.  How can you care for yourself at home?  Avoid loud noises whenever possible. This helps keep your hearing from getting worse.  Always wear hearing protection around loud noises.  Wear a hearing aid as directed.  A professional can help you pick a hearing aid that will work best for you.  You can also get hearing aids over the counter for mild to moderate hearing loss.  Have hearing tests as your doctor suggests. They can show whether your hearing has changed. Your hearing aid may need to be adjusted.  Use other devices as needed. These may include:  Telephone amplifiers and hearing aids that can connect to a television, stereo, radio, or microphone.  Devices that use lights or vibrations. These alert you to the doorbell, a ringing telephone, or a baby monitor.  Television closed-captioning. This shows the words at the bottom of the screen. Most new TVs can do this.  TTY (text telephone). This lets you type messages back and forth on the telephone instead of talking or listening. These devices are also called TDD. When messages are typed on the keyboard, they are sent over the phone line to a receiving TTY. The message is shown on a monitor.  Use text messaging, social media, and email if it is hard for you to communicate by telephone.  Try to learn a listening technique called speechreading. It is not lipreading. You pay attention to people's gestures, expressions, posture, and tone of voice. These clues can help you understand what a person is saying. Face the person you are talking to, and have them face you. Make sure the lighting is good. You need to see the other person's face clearly.  Think about counseling if you need help to adjust to your hearing loss.  When should you call for help?  Watch closely for changes in your health, and be sure to contact your doctor if:    You think your hearing is getting worse.     You have new symptoms, such as dizziness or nausea.   Where can you learn more?  Go to  "https://www.[employer].net/patiented  Enter R798 in the search box to learn more about \"Hearing Loss: Care Instructions.\"  Current as of: October 27, 2024  Content Version: 14.4 2024-2025 [employer].   Care instructions adapted under license by your healthcare professional. If you have questions about a medical condition or this instruction, always ask your healthcare professional. [employer] disclaims any warranty or liability for your use of this information.    Learning About Sleeping Well  What does sleeping well mean?     Sleeping well means getting enough sleep to feel good and stay healthy. How much sleep is enough varies among people.  The number of hours you sleep and how you feel when you wake up are both important. If you do not feel refreshed, you probably need more sleep. Another sign of not getting enough sleep is feeling tired during the day.  Experts recommend that adults get at least 7 or more hours of sleep per day. Children and older adults need more sleep.  Why is getting enough sleep important?  Getting enough quality sleep is a basic part of good health. When your sleep suffers, your physical health, mood, and your thoughts can suffer too. You may find yourself feeling more grumpy or stressed. Not getting enough sleep also can lead to serious problems, including injury, accidents, anxiety, and depression.  What might cause poor sleeping?  Many things can cause sleep problems, including:  Changes to your sleep schedule.  Stress. Stress can be caused by fear about a single event, such as giving a speech. Or you may have ongoing stress, such as worry about work or school.  Depression, anxiety, and other mental or emotional conditions.  Changes in your sleep habits or surroundings. This includes changes that happen where you sleep, such as noise, light, or sleeping in a different bed. It also includes changes in your sleep pattern, such as having jet lag or working a late " "shift.  Health problems, such as pain, breathing problems, and restless legs syndrome.  Lack of regular exercise.  Using alcohol, nicotine, or caffeine before bed.  How can you help yourself?  Here are some tips that may help you sleep more soundly and wake up feeling more refreshed.  Your sleeping area   Use your bedroom only for sleeping and sex. A bit of light reading may help you fall asleep. But if it doesn't, do your reading elsewhere in the house. Try not to use your TV, computer, smartphone, or tablet while you are in bed.  Be sure your bed is big enough to stretch out comfortably, especially if you have a sleep partner.  Keep your bedroom quiet, dark, and cool. Use curtains, blinds, or a sleep mask to block out light. To block out noise, use earplugs, soothing music, or a \"white noise\" machine.  Your evening and bedtime routine   Create a relaxing bedtime routine. You might want to take a warm shower or bath, or listen to soothing music.  Go to bed at the same time every night. And get up at the same time every morning, even if you feel tired.  What to avoid   Limit caffeine (coffee, tea, caffeinated sodas) during the day, and don't have any for at least 6 hours before bedtime.  Avoid drinking alcohol before bedtime. Alcohol can cause you to wake up more often during the night.  Try not to smoke or use tobacco, especially in the evening. Nicotine can keep you awake.  Limit naps during the day, especially close to bedtime.  Avoid lying in bed awake for too long. If you can't fall asleep or if you wake up in the middle of the night and can't get back to sleep within about 20 minutes, get out of bed and go to another room until you feel sleepy.  Avoid taking medicine right before bed that may keep you awake or make you feel hyper or energized. Your doctor can tell you if your medicine may do this and if you can take it earlier in the day.  If you can't sleep   Imagine yourself in a peaceful, pleasant scene. " "Focus on the details and feelings of being in a place that is relaxing.  Get up and do a quiet or boring activity until you feel sleepy.  Avoid drinking any liquids before going to bed to help prevent waking up often to use the bathroom.  Where can you learn more?  Go to https://www.[employer].net/patiented  Enter J942 in the search box to learn more about \"Learning About Sleeping Well.\"  Current as of: July 31, 2024  Content Version: 14.4    9316-8028 [employer]   Care instructions adapted under license by your healthcare professional. If you have questions about a medical condition or this instruction, always ask your healthcare professional. [employer] disclaims any warranty or liability for your use of this information.       "

## 2025-06-02 NOTE — PROGRESS NOTES
Preventive Care Visit  [facility]  Guero Turner MD, Internal Medicine  Jun 2, 2025      1. Encounter for Medicare annual wellness exam (Primary)    2. Essential hypertension  At goal, no change.  - ramipril (ALTACE) 2.5 MG capsule; Take 1 capsule (2.5 mg) by mouth daily.  Dispense: 90 capsule; Refill: 4    3. Lumbar foraminal stenosis  4. Lumbar facet arthropathy  5. Spondylolysis of lumbar region  6. Degeneration of intervertebral disc of lumbar region with discogenic back pain and lower extremity pain  In a lengthy discussion today with regards to low back pain including pathophysiology, expected clinical course, possible complications.  We discussed the possibility for further expert consultation including PT versus physical medicine rehab versus neurosurgery and decided against at this time.  Recommended conservative approach including yoga and james chi.  We will consider trial of buprenorphine if symptoms are not well-controlled      7. Atypical angina  He just needs a new bottle, not having any angina.  - nitroGLYcerin (NITROSTAT) 0.4 MG sublingual tablet; For chest pain place 1 tablet under the tongue every 5 minutes for 3 doses. If symptoms persist 5 minutes after 1st dose call 911.  Dispense: 25 tablet; Refill: 3    8. Allergic rhinitis, unspecified seasonality, unspecified trigger  At goal, no change.  - fluticasone (FLONASE) 50 MCG/ACT nasal spray; Spray 2 sprays in nostril daily.  Dispense: 48 g; Refill: 4    9. Acquired hypothyroidism  At goal, no change.  - levothyroxine (SYNTHROID/LEVOTHROID) 88 MCG tablet; Take 1 tablet (88 mcg) by mouth daily.  Dispense: 90 tablet; Refill: 4  - Thyrotropin GH; Future  - Thyrotropin GH    10. Mixed hyperlipidemia  At goal, no change.  - atorvastatin (LIPITOR) 10 MG tablet; Take 1 tablet (10 mg) by mouth daily.  Dispense: 90 tablet; Refill: 4    11. Stage 3a chronic kidney disease (H)  At goal, no change.  - BASIC METABOLIC PANEL;  Future  - Albumin Random Urine Quantitative with Creat Ratio; Future  - Hemoglobin; Future  - BASIC METABOLIC PANEL  - Albumin Random Urine Quantitative with Creat Ratio    12. Screening for cardiovascular condition    13. Coronary artery disease of native artery of native heart with stable angina pectoris  - Lipid Profile; Future  - Lipid Profile    14. Macrocytosis  Does not drink alcohol.  Recommend checking for other etiologies including but not limited to B12 deficiency, MGUS, others.  - Folate; Future  - Vitamin B12; Future  - CBC with platelets; Future  - Lab Blood Morphology Pathologist Review; Future  - Folate  - Vitamin B12  - Lab Blood Morphology Pathologist Review    15. Pre-diabetes  - Hemoglobin A1c; Future  - Hemoglobin A1c    16. ASCVD (arteriosclerotic cardiovascular disease)  - metoprolol succinate ER (TOPROL XL) 25 MG 24 hr tablet; Take 1 tablet (25 mg) by mouth daily.  Dispense: 90 tablet; Refill: 4    17. Atypical rash  His wife showed me a picture of a rash that was on his abdomen.  Looks like erythema migrans.  I recommend ruling out Lyme disease.  The photo was from about a month ago.  - Lyme Disease Total Antibodies with Reflex to Confirmation; Future  - Lyme Disease Total Antibodies with Reflex to Confirmation    18. IPMN (intraductal papillary mucinous neoplasm)  Due for follow-up imaging.  Consider GI consult if changes.  - MR Abdomen MRCP w/o & w Contrast; Future      Patient Instructions    -- Consider buprenorphine   -- Try yoga/james chi     -- Reduce metoprolol   -- Talk to Cardiology about SGLT2 drugs    Find Local Classes   * Preventing falls   * Preventing and managing diabetes   * Managing chronic conditions and pain   * Mental Health    Examples of classes:   -- Diabetes prevention program (Pre-Diabetes or at risk for diabetes)   -- Living well with Diabetes   -- Living well with Chronic Conditions   -- Living well with Chronic Pain   -- Stay Active and Independent for Life  (SAIL)   -- Alex Virgil Kern: Moving for better balance   -- Older Adult Mental Health First Aid   -- Walk with ease free walking program    How do I sign up?  Stop by the [place] check-out desk for a Aliyah referral  Call Elder Webster [phone]  Contact Aliyah directly via: [email or web address] or [phone]             Signed, Guero Turner MD, FAAP, FACP  Internal Medicine & Pediatrics      Subjective   Jon is a 80 year old, presenting for the following:  Medicare Visit (annual) and Recheck Medication        6/2/2025     8:04 AM   Additional Questions   Roomed by RUDY Gonzalez   Accompanied by wife- Kari BARAJAS           Advance Care Planning            6/2/2025   General Health   How would you rate your overall physical health? Good   Feel stress (tense, anxious, or unable to sleep) Only a little   (!) STRESS CONCERN      6/2/2025   Nutrition   Diet: Regular (no restrictions)         6/2/2025   Exercise   Days per week of moderate/strenous exercise 3 days   Average minutes spent exercising at this level 20 min         6/2/2025   Social Factors   Frequency of gathering with friends or relatives Twice a week   Worry food won't last until get money to buy more No   Food not last or not have enough money for food? No   Do you have housing? (Housing is defined as stable permanent housing and does not include staying outside in a car, in a tent, in an abandoned building, in an overnight shelter, or couch-surfing.) Yes   Are you worried about losing your housing? No   Lack of transportation? No   Unable to get utilities (heat,electricity)? No         6/2/2025   Fall Risk   Fallen 2 or more times in the past year? No   Trouble with walking or balance? No          6/2/2025   Activities of Daily Living- Home Safety   Needs help with the following daily activites None of the above   Safety concerns in the home No grab bars in the bathroom         6/2/2025   Dental   Dentist two times every year? Yes          6/2/2025   Hearing Screening   Hearing concerns? (!) I FEEL THAT PEOPLE ARE MUMBLING OR NOT SPEAKING CLEARLY.    (!) I NEED TO ASK PEOPLE TO SPEAK UP OR REPEAT THEMSELVES.    (!) IT'S HARDER TO UNDERSTAND WOMEN'S VOICES THAN MEN'S VOICES.    (!) IT'S HARD TO FOLLOW A CONVERSATION IN A NOISY RESTAURANT OR CROWDED ROOM.    (!) TROUBLE UNDESTANDING A SPEAKER IN A PUBLIC MEETING OR [employer] SERVICE.    (!) TROUBLE FOLLOWING DIALOGUE IN THE THEATHER.    (!) TROUBLE UNDERSTANDING SOFT OR WHISPERED SPEECH.    (!) TROUBLE UNDERSTANDING SPEECH ON THE TELEPHONE       Multiple values from one day are sorted in reverse-chronological order         6/2/2025   Driving Risk Screening   Patient/family members have concerns about driving No         6/2/2025   General Alertness/Fatigue Screening   Have you been more tired than usual lately? (!) YES         6/2/2025   Urinary Incontinence Screening   Bothered by leaking urine in past 6 months No         Today's PHQ-2 Score:       6/2/2025     8:02 AM   PHQ-2 ( 1999 [employer])   Q1: Little interest or pleasure in doing things 1   Q2: Feeling down, depressed or hopeless 0   PHQ-2 Score 1    Q1: Little interest or pleasure in doing things Several days   Q2: Feeling down, depressed or hopeless Not at all   PHQ-2 Score 1       Patient-reported           6/2/2025   Substance Use   Alcohol more than 3/day or more than 7/wk Not Applicable   Do you have a current opioid prescription? No   How severe/bad is pain from 1 to 10? 3/10   Do you use any other substances recreationally? No     Social History     Tobacco Use    Smoking status: Never    Smokeless tobacco: Never   Vaping Use    Vaping status: Never Used   Substance Use Topics    Alcohol use: Not Currently     Comment: zero use since 6/2018    Drug use: No                 Reviewed and updated as needed this visit by Provider   Tobacco  Allergies  Meds  Problems  Med Hx  Surg Hx  Fam Hx              Current providers sharing in care  "for this patient include:  Patient Care Team:  Guero Turner MD as PCP - General (Pediatrics)  Guero Turner MD as Assigned PCP    The following health maintenance items are reviewed in Epic and correct as of today:  Health Maintenance   Topic Date Due    MEDICARE ANNUAL WELLNESS VISIT  06/02/2026    BMP  06/02/2026    LIPID  06/02/2026    MICROALBUMIN  06/02/2026    TSH W/FREE T4 REFLEX  06/02/2026    FALL RISK ASSESSMENT  06/02/2026    HEMOGLOBIN  06/02/2026    ADVANCE CARE PLANNING  05/31/2029    DTAP/TDAP/TD VACCINE (3 - Td or Tdap) 08/23/2033    PHQ-2 (once per calendar year)  Completed    INFLUENZA VACCINE  Completed    PNEUMOCOCCAL VACCINE 50+ YEARS  Completed    URINALYSIS  Completed    ZOSTER VACCINE  Completed    RSV VACCINE  Completed    COVID-19 VACCINE  Completed    HPV VACCINE  Aged Out    MENINGITIS VACCINE  Aged Out    COLORECTAL CANCER SCREENING  Discontinued            Objective    Exam  /68   Pulse 84   Temp 97.7  F (36.5  C) (Tympanic)   Resp 16   Ht 1.715 m (5' 7.5\")   Wt 66.5 kg (146 lb 9.6 oz)   SpO2 98%   BMI 22.62 kg/m     Estimated body mass index is 22.62 kg/m  as calculated from the following:    Height as of this encounter: 1.715 m (5' 7.5\").    Weight as of this encounter: 66.5 kg (146 lb 9.6 oz).    Physical Exam  Gen: Alert, NAD.  Neck: No carotid bruits  CV: RRR no m/r/g  Pulm: CTAB, no w/r/r. No increased work of breathing  Msk: No LE edema  Neuro: Grossly intact  Skin: No concerning lesions.  Psychiatric: Normal affect and insight. Does not appear anxious or depressed.          6/2/2025   Mini Cog   Clock Draw Score 2 Normal   3 Item Recall 3 objects recalled   Mini Cog Total Score 5              Signed Electronically by: Guero Turner MD    "

## 2025-06-03 LAB — B BURGDOR IGG+IGM SER QL: 0.06

## 2025-06-04 LAB — PATH REPORT.FINAL DX SPEC: NORMAL

## 2025-06-11 ENCOUNTER — HOSPITAL ENCOUNTER (OUTPATIENT)
Dept: MRI IMAGING | Facility: OTHER | Age: 80
Discharge: HOME | End: 2025-06-11
Attending: INTERNAL MEDICINE
Payer: COMMERCIAL

## 2025-06-11 DIAGNOSIS — D49.0 IPMN (INTRADUCTAL PAPILLARY MUCINOUS NEOPLASM): Chronic | ICD-10-CM

## 2025-06-11 PROCEDURE — 255N000002 HC RX 255 OP 636: Performed by: INTERNAL MEDICINE

## 2025-06-11 PROCEDURE — A9575 INJ GADOTERATE MEGLUMI 0.1ML: HCPCS | Performed by: INTERNAL MEDICINE

## 2025-06-11 PROCEDURE — 74183 MRI ABD W/O CNTR FLWD CNTR: CPT

## 2025-06-11 PROCEDURE — 74183 MRI ABD W/O CNTR FLWD CNTR: CPT | Mod: 26 | Performed by: RADIOLOGY

## 2025-06-11 RX ORDER — GADOTERATE MEGLUMINE 376.9 MG/ML
15 INJECTION INTRAVENOUS ONCE
Status: COMPLETED | OUTPATIENT
Start: 2025-06-11 | End: 2025-06-11

## 2025-06-11 RX ADMIN — GADOTERATE MEGLUMINE 13 ML: 376.9 INJECTION INTRAVENOUS at 10:23

## 2025-07-28 DIAGNOSIS — M47.816 LUMBAR FACET ARTHROPATHY: ICD-10-CM

## 2025-07-28 DIAGNOSIS — I10 ESSENTIAL HYPERTENSION: ICD-10-CM

## 2025-07-28 DIAGNOSIS — M51.362 DEGENERATION OF INTERVERTEBRAL DISC OF LUMBAR REGION WITH DISCOGENIC BACK PAIN AND LOWER EXTREMITY PAIN: ICD-10-CM

## 2025-07-28 DIAGNOSIS — M48.061 LUMBAR FORAMINAL STENOSIS: ICD-10-CM

## 2025-07-28 DIAGNOSIS — E78.2 MIXED HYPERLIPIDEMIA: ICD-10-CM

## 2025-08-05 RX ORDER — RAMIPRIL 2.5 MG/1
CAPSULE ORAL
Qty: 90 CAPSULE | Refills: 4 | OUTPATIENT
Start: 2025-08-05

## 2025-08-05 RX ORDER — ATORVASTATIN CALCIUM 10 MG/1
10 TABLET, FILM COATED ORAL DAILY
Qty: 90 TABLET | Refills: 4 | OUTPATIENT
Start: 2025-08-05

## (undated) DEVICE — KIT ENDO FIRST STEP DISINFECTANT 200ML W/POUCH EP-4

## (undated) DEVICE — SOL WATER 1500ML

## (undated) DEVICE — ESU GROUND PAD ADULT W/CORD E7507

## (undated) DEVICE — DRAIN PENROSE 0.25"X12" LATEX FREE GR101

## (undated) DEVICE — PAD CHUX UNDERPAD 23X24" 7136

## (undated) DEVICE — ENDO PROBE COVER ULTRASOUND BALLOON LATEX  MAJ-249

## (undated) DEVICE — BLADE CLIPPER 4406

## (undated) DEVICE — ENDO CAP AND TUBING STERILE FOR ENDOGATOR  100130

## (undated) DEVICE — SOL WATER IRRIG 1000ML BOTTLE 2F7114

## (undated) DEVICE — SU VICRYL 2-0 CT-2 CR 8X18" J726D

## (undated) DEVICE — KIT CONNECTOR FOR OLYMPUS ENDOSCOPES DEFENDO 100310

## (undated) DEVICE — ENDO DEVICE LOCKING AND BIOPSY CAP M00545261

## (undated) DEVICE — DRSG MEDIPORE 3 1/2X4" 3566

## (undated) DEVICE — ENDO BITE BLOCK ADULT OMNI-BLOC

## (undated) DEVICE — ENDO TUBING CO2 SMARTCAP STERILE DISP 100145CO2EXT

## (undated) DEVICE — WIRE GUIDE 0.025"X270CM SHORT STR VISIGLIDE 2 G-260-2527S

## (undated) DEVICE — DRSG STERI STRIP 1/2X4" R1547

## (undated) DEVICE — GLOVE PROTEXIS POWDER FREE SMT 6.5  2D72PT65X

## (undated) DEVICE — SUCTION MANIFOLD DORNOCH ULTRA CART UL-CL500

## (undated) DEVICE — SLEEVE COMPRESSION SCD KNEE MED 74022

## (undated) DEVICE — PREP CHLORAPREP 26ML TINTED ORANGE  260815

## (undated) DEVICE — LIGHT HANDLES PLASTIC

## (undated) DEVICE — PACK ENDOSCOPY GI CUSTOM UMMC

## (undated) DEVICE — ESU ELEC BLADE 2.75" COATED/INSULATED E1455

## (undated) DEVICE — ENDO BASKET RETRIEVAL TRAPEZOID 2.0CM 1087

## (undated) DEVICE — BIOPSY VALVE BIOSHIELD 00711135

## (undated) DEVICE — ENDO FUSION OMNI-TOME G31903

## (undated) DEVICE — PREP SKIN SCRUB TRAY 4461A

## (undated) DEVICE — SU MONOCRYL 4-0 PS-2 27" UND Y426H

## (undated) DEVICE — PACK MAJOR LAPAROTOMY LF SBA15MLFCA

## (undated) DEVICE — GLOVE PROTEXIS BLUE W/NEU-THERA 6.5  2D73EB65

## (undated) RX ORDER — KETAMINE HYDROCHLORIDE 50 MG/ML
INJECTION, SOLUTION INTRAMUSCULAR; INTRAVENOUS
Status: DISPENSED
Start: 2019-01-09

## (undated) RX ORDER — FENTANYL CITRATE 50 UG/ML
INJECTION, SOLUTION INTRAMUSCULAR; INTRAVENOUS
Status: DISPENSED
Start: 2019-01-09

## (undated) RX ORDER — LIDOCAINE HYDROCHLORIDE 10 MG/ML
INJECTION, SOLUTION EPIDURAL; INFILTRATION; INTRACAUDAL; PERINEURAL
Status: DISPENSED
Start: 2021-11-11

## (undated) RX ORDER — CEFAZOLIN SODIUM 2 G/100ML
INJECTION, SOLUTION INTRAVENOUS
Status: DISPENSED
Start: 2019-01-09

## (undated) RX ORDER — BUPIVACAINE HYDROCHLORIDE AND EPINEPHRINE 5; 5 MG/ML; UG/ML
INJECTION, SOLUTION EPIDURAL; INTRACAUDAL; PERINEURAL
Status: DISPENSED
Start: 2019-01-09

## (undated) RX ORDER — SODIUM CHLORIDE, SODIUM LACTATE, POTASSIUM CHLORIDE, CALCIUM CHLORIDE 600; 310; 30; 20 MG/100ML; MG/100ML; MG/100ML; MG/100ML
INJECTION, SOLUTION INTRAVENOUS
Status: DISPENSED
Start: 2019-01-09

## (undated) RX ORDER — DEXAMETHASONE SODIUM PHOSPHATE 4 MG/ML
INJECTION, SOLUTION INTRA-ARTICULAR; INTRALESIONAL; INTRAMUSCULAR; INTRAVENOUS; SOFT TISSUE
Status: DISPENSED
Start: 2019-05-13

## (undated) RX ORDER — LIDOCAINE HYDROCHLORIDE 20 MG/ML
INJECTION, SOLUTION EPIDURAL; INFILTRATION; INTRACAUDAL; PERINEURAL
Status: DISPENSED
Start: 2019-05-13

## (undated) RX ORDER — PROPOFOL 10 MG/ML
INJECTION, EMULSION INTRAVENOUS
Status: DISPENSED
Start: 2019-09-23

## (undated) RX ORDER — LIDOCAINE HYDROCHLORIDE 10 MG/ML
INJECTION, SOLUTION INFILTRATION; PERINEURAL
Status: DISPENSED
Start: 2021-11-11

## (undated) RX ORDER — ONDANSETRON 2 MG/ML
INJECTION INTRAMUSCULAR; INTRAVENOUS
Status: DISPENSED
Start: 2019-01-09

## (undated) RX ORDER — PROPOFOL 10 MG/ML
INJECTION, EMULSION INTRAVENOUS
Status: DISPENSED
Start: 2019-01-09

## (undated) RX ORDER — EPHEDRINE SULFATE 50 MG/ML
INJECTION, SOLUTION INTRAMUSCULAR; INTRAVENOUS; SUBCUTANEOUS
Status: DISPENSED
Start: 2019-09-23

## (undated) RX ORDER — LIDOCAINE HYDROCHLORIDE 10 MG/ML
INJECTION, SOLUTION EPIDURAL; INFILTRATION; INTRACAUDAL; PERINEURAL
Status: DISPENSED
Start: 2019-01-09

## (undated) RX ORDER — PROPOFOL 10 MG/ML
INJECTION, EMULSION INTRAVENOUS
Status: DISPENSED
Start: 2019-05-13

## (undated) RX ORDER — LIDOCAINE HYDROCHLORIDE 20 MG/ML
INJECTION, SOLUTION EPIDURAL; INFILTRATION; INTRACAUDAL; PERINEURAL
Status: DISPENSED
Start: 2019-09-23

## (undated) RX ORDER — IOPAMIDOL 510 MG/ML
INJECTION, SOLUTION INTRAVASCULAR
Status: DISPENSED
Start: 2019-09-23

## (undated) RX ORDER — ONDANSETRON 2 MG/ML
INJECTION INTRAMUSCULAR; INTRAVENOUS
Status: DISPENSED
Start: 2019-09-23

## (undated) RX ORDER — ONDANSETRON 2 MG/ML
INJECTION INTRAMUSCULAR; INTRAVENOUS
Status: DISPENSED
Start: 2019-05-13

## (undated) RX ORDER — ESMOLOL HYDROCHLORIDE 10 MG/ML
INJECTION INTRAVENOUS
Status: DISPENSED
Start: 2019-09-23

## (undated) RX ORDER — FENTANYL CITRATE 50 UG/ML
INJECTION, SOLUTION INTRAMUSCULAR; INTRAVENOUS
Status: DISPENSED
Start: 2019-09-23

## (undated) RX ORDER — SODIUM CHLORIDE, SODIUM LACTATE, POTASSIUM CHLORIDE, CALCIUM CHLORIDE 600; 310; 30; 20 MG/100ML; MG/100ML; MG/100ML; MG/100ML
INJECTION, SOLUTION INTRAVENOUS
Status: DISPENSED
Start: 2019-09-23

## (undated) RX ORDER — FENTANYL CITRATE 50 UG/ML
INJECTION, SOLUTION INTRAMUSCULAR; INTRAVENOUS
Status: DISPENSED
Start: 2019-05-13

## (undated) RX ORDER — SIMETHICONE 40MG/0.6ML
SUSPENSION, DROPS(FINAL DOSAGE FORM)(ML) ORAL
Status: DISPENSED
Start: 2019-09-23

## (undated) RX ORDER — DEXAMETHASONE SODIUM PHOSPHATE 10 MG/ML
INJECTION, SOLUTION INTRAMUSCULAR; INTRAVENOUS
Status: DISPENSED
Start: 2021-11-11

## (undated) RX ORDER — INDOMETHACIN 50 MG/1
SUPPOSITORY RECTAL
Status: DISPENSED
Start: 2019-09-23